# Patient Record
Sex: FEMALE | Race: WHITE | Employment: FULL TIME | URBAN - METROPOLITAN AREA
[De-identification: names, ages, dates, MRNs, and addresses within clinical notes are randomized per-mention and may not be internally consistent; named-entity substitution may affect disease eponyms.]

---

## 2017-01-05 ENCOUNTER — NURSE ONLY (OUTPATIENT)
Dept: ENDOCRINOLOGY CLINIC | Facility: CLINIC | Age: 32
End: 2017-01-05

## 2017-01-05 DIAGNOSIS — E11.8 UNCONTROLLED TYPE 2 DIABETES MELLITUS WITH COMPLICATION, WITH LONG-TERM CURRENT USE OF INSULIN (HCC): Primary | ICD-10-CM

## 2017-01-05 DIAGNOSIS — E11.65 UNCONTROLLED TYPE 2 DIABETES MELLITUS WITH COMPLICATION, WITH LONG-TERM CURRENT USE OF INSULIN (HCC): Primary | ICD-10-CM

## 2017-01-05 DIAGNOSIS — Z79.4 UNCONTROLLED TYPE 2 DIABETES MELLITUS WITH COMPLICATION, WITH LONG-TERM CURRENT USE OF INSULIN (HCC): Primary | ICD-10-CM

## 2017-01-05 PROCEDURE — 99211 OFF/OP EST MAY X REQ PHY/QHP: CPT | Performed by: INTERNAL MEDICINE

## 2017-01-05 NOTE — PROGRESS NOTES
Tessie June in clinic today for BG review. Currently 11 weeks pregnant. [facility]. Per Flowsheets blood sugars have been elevated overall for last few days.  She did miss one day entirely when she forgot her meter a

## 2017-01-14 ENCOUNTER — CHARTING TRANS (OUTPATIENT)
Dept: OTHER | Age: 32
End: 2017-01-14

## 2017-01-16 ENCOUNTER — PATIENT MESSAGE (OUTPATIENT)
Dept: ENDOCRINOLOGY CLINIC | Facility: CLINIC | Age: 32
End: 2017-01-16

## 2017-01-26 ENCOUNTER — OFFICE VISIT (OUTPATIENT)
Dept: ENDOCRINOLOGY CLINIC | Facility: CLINIC | Age: 32
End: 2017-01-26

## 2017-01-26 VITALS
SYSTOLIC BLOOD PRESSURE: 138 MMHG | BODY MASS INDEX: 44 KG/M2 | WEIGHT: 267 LBS | DIASTOLIC BLOOD PRESSURE: 82 MMHG | HEART RATE: 105 BPM

## 2017-01-26 DIAGNOSIS — E11.8 UNCONTROLLED TYPE 2 DIABETES MELLITUS WITH COMPLICATION, UNSPECIFIED LONG TERM INSULIN USE STATUS: Primary | ICD-10-CM

## 2017-01-26 DIAGNOSIS — E11.65 UNCONTROLLED TYPE 2 DIABETES MELLITUS WITH COMPLICATION, UNSPECIFIED LONG TERM INSULIN USE STATUS: Primary | ICD-10-CM

## 2017-01-26 LAB
CARTRIDGE LOT#: ABNORMAL NUMERIC
GLUCOSE BLOOD: 103
HEMOGLOBIN A1C: 7.1 % (ref 4.3–5.6)
TEST STRIP LOT #: NORMAL NUMERIC

## 2017-01-26 PROCEDURE — 36416 COLLJ CAPILLARY BLOOD SPEC: CPT | Performed by: INTERNAL MEDICINE

## 2017-01-26 PROCEDURE — 82962 GLUCOSE BLOOD TEST: CPT | Performed by: INTERNAL MEDICINE

## 2017-01-26 PROCEDURE — 99214 OFFICE O/P EST MOD 30 MIN: CPT | Performed by: INTERNAL MEDICINE

## 2017-01-26 PROCEDURE — 83036 HEMOGLOBIN GLYCOSYLATED A1C: CPT | Performed by: INTERNAL MEDICINE

## 2017-01-26 NOTE — PROGRESS NOTES
Name: Viv Kelly  Date: 1/26/2017    Referring Physician: No ref. provider found    HISTORY OF PRESENT ILLNESS   Viv Kelly is a 27year old female who presents for diabetes mellitus, complicated by pregnancy currently 14 weeks pregnant. Pen-injector, Inject 16 Units into the skin 3 (three) times daily before meals.  As instructed, Disp: 45 mL, Rfl: 1  •  Levothyroxine Sodium 137 MCG Oral Tab, Take 137 mcg by mouth before breakfast., Disp: 90 tablet, Rfl: 1  •  Insulin Pen Needle (BD PEN NE Wt 267 lb (121.11 kg)  LMP 03/13/2016    General Appearance:  alert, well developed, in no acute distress  Eyes:  normal conjunctivae, sclera. , normal sclera and normal pupils  Ears/Nose/Mouth/Throat/Neck:  no palpable thyroid nodules or cervical lymphaden

## 2017-01-31 ENCOUNTER — LAB SERVICES (OUTPATIENT)
Dept: OTHER | Age: 32
End: 2017-01-31

## 2017-01-31 ENCOUNTER — CHARTING TRANS (OUTPATIENT)
Dept: OTHER | Age: 32
End: 2017-01-31

## 2017-01-31 LAB
APPEARANCE: CLEAR
BILIRUBIN: NORMAL
COLOR: YELLOW
GLUCOSE U: NORMAL
KETONES: NORMAL
LEUKOCYTES: NORMAL
NITRITE: NORMAL
OCCULT BLOOD: NORMAL
PH: 6
PROTEIN: NORMAL
URINE SPEC GRAVITY: >=1.03
UROBILINOGEN: NORMAL

## 2017-02-01 ENCOUNTER — CHARTING TRANS (OUTPATIENT)
Dept: OTHER | Age: 32
End: 2017-02-01

## 2017-02-02 ENCOUNTER — CHARTING TRANS (OUTPATIENT)
Dept: OTHER | Age: 32
End: 2017-02-02

## 2017-02-02 LAB
ABO + RH BLD: ABNORMAL
BACTERIA UR CULT: NORMAL
BASOPHILS # BLD: 0 K/MCL (ref 0–0.3)
BASOPHILS NFR BLD: 0 %
BLD GP AB SCN SERPL QL: NEGATIVE
DIFFERENTIAL METHOD BLD: ABNORMAL
EOSINOPHIL # BLD: 0.1 K/MCL (ref 0.1–0.5)
EOSINOPHIL NFR BLD: 1 %
ERYTHROCYTE [DISTWIDTH] IN BLOOD: 14 % (ref 11–15)
HBV SURFACE AG SER QL: NEGATIVE
HEMATOCRIT: 38.7 % (ref 36–46.5)
HEMOGLOBIN: 12.7 G/DL (ref 12–15.5)
HIV 1+2 AB+HIV1 P24 AG SERPL QL IA: NONREACTIVE
LYMPHOCYTES # BLD: 2.8 K/MCL (ref 1–4.8)
LYMPHOCYTES NFR BLD: 22 %
MEAN CORPUSCULAR HEMOGLOBIN: 28.2 PG (ref 26–34)
MEAN CORPUSCULAR HGB CONC: 32.8 G/DL (ref 32–36.5)
MEAN CORPUSCULAR VOLUME: 85.8 FL (ref 78–100)
MONOCYTES # BLD: 0.6 K/MCL (ref 0.3–0.9)
MONOCYTES NFR BLD: 5 %
NEUTROPHILS # BLD: 9.1 K/MCL (ref 1.8–7.7)
NEUTROPHILS NFR BLD: 72 %
PLATELET COUNT: 191 K/MCL (ref 140–450)
RED CELL COUNT: 4.51 MIL/MCL (ref 4–5.2)
RUBV IGG SERPL IA-ACNC: 348.2 UNITS/ML
T PALLIDUM IGG SER QL IA: NONREACTIVE
WHITE BLOOD COUNT: 12.7 K/MCL (ref 4.2–11)

## 2017-02-07 ENCOUNTER — CHARTING TRANS (OUTPATIENT)
Dept: OTHER | Age: 32
End: 2017-02-07

## 2017-02-07 LAB
CFTR ALLELE 1 BLD/T QL: NORMAL
CFTR ALLELE 2 BLD/T QL: NORMAL
CFTR MUT ANL BLD/T: NORMAL
HISTORY OF FAMILY MEMBER DISEASES NOTE: NORMAL
PATHOLOGIST NAME: NORMAL
SYMPTOM: NORMAL

## 2017-02-10 ENCOUNTER — APPOINTMENT (OUTPATIENT)
Dept: LAB | Age: 32
End: 2017-02-10
Attending: INTERNAL MEDICINE
Payer: COMMERCIAL

## 2017-02-10 ENCOUNTER — PATIENT MESSAGE (OUTPATIENT)
Dept: ENDOCRINOLOGY CLINIC | Facility: CLINIC | Age: 32
End: 2017-02-10

## 2017-02-10 ENCOUNTER — TELEPHONE (OUTPATIENT)
Dept: ENDOCRINOLOGY CLINIC | Facility: CLINIC | Age: 32
End: 2017-02-10

## 2017-02-10 DIAGNOSIS — E03.9 HYPOTHYROIDISM, UNSPECIFIED TYPE: ICD-10-CM

## 2017-02-10 LAB
T4 FREE SERPL-MCNC: 0.87 NG/DL (ref 0.58–1.64)
TSH SERPL-ACNC: 3.61 UIU/ML (ref 0.34–5.6)

## 2017-02-10 PROCEDURE — 84443 ASSAY THYROID STIM HORMONE: CPT

## 2017-02-10 PROCEDURE — 84439 ASSAY OF FREE THYROXINE: CPT

## 2017-02-10 PROCEDURE — 36415 COLL VENOUS BLD VENIPUNCTURE: CPT

## 2017-02-10 NOTE — TELEPHONE ENCOUNTER
Patient stated OK to leave detailed message since she is at work. Left detailed message with patient of Dr. Adebayo Muñoz message below. Requested she call or email to confirm she got the message.

## 2017-02-10 NOTE — TELEPHONE ENCOUNTER
Patient has not yet read [employer] message.  LMTCB- informed patient to look in [employer]

## 2017-02-10 NOTE — TELEPHONE ENCOUNTER
Patient called very upset. Her nephew recently passed away and she has been off of work for some time. She is now re-starting work and needs to reschedule her nurse appointment to 2/28. Booked for 1pm that day. She is not able to come tomorrow.  She is conc

## 2017-02-10 NOTE — TELEPHONE ENCOUNTER
Overall BG levels are slowly improving - increase Levemir to 20 in the morning and 80 at night. Has she been thinking about insulin pump?

## 2017-02-11 NOTE — TELEPHONE ENCOUNTER
From: Zachary Heck RN  To: Cecil Diaz  Sent: 2/10/2017 9:58 AM CST  Subject: Follow up on medication dose    Good Morning Destini,   You spoke with Severa Arm earlier and she did have Dr. Sofya Abernathy take a look at your blood sugars.  She says, \"Overal

## 2017-02-11 NOTE — TELEPHONE ENCOUNTER
Dr. Mckay Lugo please see patient's message.  Refill for Novolog with updated instructions pending 90 day refill per patient request.

## 2017-02-13 ENCOUNTER — TELEPHONE (OUTPATIENT)
Dept: ENDOCRINOLOGY CLINIC | Facility: CLINIC | Age: 32
End: 2017-02-13

## 2017-02-13 DIAGNOSIS — O99.280 HYPOTHYROIDISM AFFECTING PREGNANCY: Primary | ICD-10-CM

## 2017-02-13 DIAGNOSIS — E03.9 HYPOTHYROIDISM AFFECTING PREGNANCY: Primary | ICD-10-CM

## 2017-02-13 NOTE — TELEPHONE ENCOUNTER
Please call patient - thyroid levels are not at goal, increase LT4 to 150mcg PO daily. Repeat TSH, FT4 in one month.

## 2017-02-14 RX ORDER — LEVOTHYROXINE SODIUM 0.15 MG/1
150 TABLET ORAL
Qty: 30 TABLET | Refills: 3 | Status: SHIPPED | OUTPATIENT
Start: 2017-02-14 | End: 2017-03-15 | Stop reason: DRUGHIGH

## 2017-02-15 NOTE — TELEPHONE ENCOUNTER
Spoke with patient and informed her of Dr. Warren Brice message below. She verbalized her understanding. Ordered repeat blood work. Rx has been sent to pharmacy.

## 2017-02-19 ENCOUNTER — PATIENT MESSAGE (OUTPATIENT)
Dept: ENDOCRINOLOGY CLINIC | Facility: CLINIC | Age: 32
End: 2017-02-19

## 2017-02-20 NOTE — TELEPHONE ENCOUNTER
From: Pankaj Gregg  To: Ann Burnett MD  Sent: 2/19/2017 8:29 AM CST  Subject: Prescription Question    Hi Dr. Franki Topete,     Can you send a 90 refill for the levemir? Current dosing is 80 units at night and 20 in the morning. Thanks so much!

## 2017-02-28 ENCOUNTER — NURSE ONLY (OUTPATIENT)
Dept: ENDOCRINOLOGY CLINIC | Facility: CLINIC | Age: 32
End: 2017-02-28

## 2017-02-28 DIAGNOSIS — Z79.4 UNCONTROLLED TYPE 2 DIABETES MELLITUS WITH COMPLICATION, WITH LONG-TERM CURRENT USE OF INSULIN (HCC): Primary | ICD-10-CM

## 2017-02-28 DIAGNOSIS — E11.8 UNCONTROLLED TYPE 2 DIABETES MELLITUS WITH COMPLICATION, WITH LONG-TERM CURRENT USE OF INSULIN (HCC): Primary | ICD-10-CM

## 2017-02-28 DIAGNOSIS — E11.65 UNCONTROLLED TYPE 2 DIABETES MELLITUS WITH COMPLICATION, WITH LONG-TERM CURRENT USE OF INSULIN (HCC): Primary | ICD-10-CM

## 2017-02-28 LAB
CARTRIDGE LOT#: ABNORMAL NUMERIC
HEMOGLOBIN A1C: 6 % (ref 4.3–5.6)

## 2017-02-28 PROCEDURE — 83036 HEMOGLOBIN GLYCOSYLATED A1C: CPT | Performed by: INTERNAL MEDICINE

## 2017-02-28 PROCEDURE — 36416 COLLJ CAPILLARY BLOOD SPEC: CPT | Performed by: INTERNAL MEDICINE

## 2017-02-28 NOTE — PROGRESS NOTES
Louie Fontana presents to clinic for BG review. Currently 18.5 weeks pregnant. Recently found out she is having baby boy. She is feeling well. Blood sugars reviewed per flowsheet.    Discussed again goals of fasting blood sugars between 70-90 and pos prandial goa

## 2017-03-01 ENCOUNTER — TELEPHONE (OUTPATIENT)
Dept: ENDOCRINOLOGY CLINIC | Facility: CLINIC | Age: 32
End: 2017-03-01

## 2017-03-01 NOTE — TELEPHONE ENCOUNTER
Sent patient mychart message with information below. Will await response to get patient scheduled for MD appt.

## 2017-03-01 NOTE — TELEPHONE ENCOUNTER
HgA1c is significantly improved, 6.0%. In addition review of BG flowsheet overall demonstrates improved levels. Will continue current dose of insulin. Please schedule MD visit in 2 weeks.

## 2017-03-01 NOTE — TELEPHONE ENCOUNTER
Patient seen in clinic for BG review yesterday. Please review HgA1C and BG readings on flowsheet to see if any insulin changes need to be made.

## 2017-03-02 ENCOUNTER — CHARTING TRANS (OUTPATIENT)
Dept: OTHER | Age: 32
End: 2017-03-02

## 2017-03-02 ENCOUNTER — LAB SERVICES (OUTPATIENT)
Dept: OTHER | Age: 32
End: 2017-03-02

## 2017-03-02 LAB
APPEARANCE: CLEAR
BILIRUBIN: NEGATIVE
COLOR: YELLOW
GLUCOSE U: NEGATIVE
KETONES: NEGATIVE
LEUKOCYTES: NEGATIVE
NITRITE: NEGATIVE
OCCULT BLOOD: NEGATIVE
PH: 5.5
PROTEIN: NEGATIVE
URINE SPEC GRAVITY: 1.03
UROBILINOGEN: 0.2

## 2017-03-06 ENCOUNTER — PATIENT MESSAGE (OUTPATIENT)
Dept: ENDOCRINOLOGY CLINIC | Facility: CLINIC | Age: 32
End: 2017-03-06

## 2017-03-07 NOTE — TELEPHONE ENCOUNTER
From: Lashell Cooper  To:  Bryan Oakley MD  Sent: 3/6/2017 5:33 PM CST  Subject: Prescription Question    Hi Dr. Mendel Downy,    My sugars the last couple days have been much higher than normal even though I'm still eating the same things and monitoring my

## 2017-03-07 NOTE — TELEPHONE ENCOUNTER
Patient is currently pregnant. Blood sugars available via Flowsheets. Dr. Ines Thorne would you mind taking a look?

## 2017-03-08 NOTE — TELEPHONE ENCOUNTER
Per Dr. Chuy Perez she would like to make the following changes:    Increase Novolog to 28u with breakfast, 28u with lunch, and 30u with dinner  Increase morning lantus dose to 24 units, keep evening dose the same at 80 units.      Email/Call if álvaro corrigan

## 2017-03-08 NOTE — TELEPHONE ENCOUNTER
Per request from AM. Called patient for sugars in relation to meals. Recorded sugars listed below. Insulin doses available in Email. Patient did not eat today. Her sugar now is 74. She will be eating dinner in a few minutes.               JOSIAH COLIN

## 2017-03-09 ENCOUNTER — PATIENT MESSAGE (OUTPATIENT)
Dept: ENDOCRINOLOGY CLINIC | Facility: CLINIC | Age: 32
End: 2017-03-09

## 2017-03-09 NOTE — TELEPHONE ENCOUNTER
From: Deja Menendez  To: July Sanchez MD  Sent: 3/9/2017 7:33 AM CST  Subject: Prescription Question    Hi Dr. Wanda Rodríguez,    My morning sugars are still high, yesterday before breakfast was 124 and today was 134 even though it was 88 when I went to bed.

## 2017-03-14 ENCOUNTER — APPOINTMENT (OUTPATIENT)
Dept: LAB | Age: 32
End: 2017-03-14
Attending: INTERNAL MEDICINE
Payer: COMMERCIAL

## 2017-03-14 DIAGNOSIS — O99.280 HYPOTHYROIDISM AFFECTING PREGNANCY: ICD-10-CM

## 2017-03-14 DIAGNOSIS — E03.9 HYPOTHYROIDISM AFFECTING PREGNANCY: ICD-10-CM

## 2017-03-14 LAB
T4 FREE SERPL-MCNC: 0.67 NG/DL (ref 0.58–1.64)
TSH SERPL-ACNC: 2.47 UIU/ML (ref 0.34–5.6)

## 2017-03-14 PROCEDURE — 84443 ASSAY THYROID STIM HORMONE: CPT

## 2017-03-14 PROCEDURE — 84439 ASSAY OF FREE THYROXINE: CPT

## 2017-03-14 PROCEDURE — 36415 COLL VENOUS BLD VENIPUNCTURE: CPT

## 2017-03-15 ENCOUNTER — PATIENT MESSAGE (OUTPATIENT)
Dept: ENDOCRINOLOGY CLINIC | Facility: CLINIC | Age: 32
End: 2017-03-15

## 2017-03-15 DIAGNOSIS — E03.9 HYPOTHYROIDISM, UNSPECIFIED TYPE: Primary | ICD-10-CM

## 2017-03-15 RX ORDER — LEVOTHYROXINE SODIUM 175 UG/1
175 TABLET ORAL
Qty: 30 TABLET | Refills: 3 | Status: SHIPPED | OUTPATIENT
Start: 2017-03-15 | End: 2017-04-04

## 2017-03-15 NOTE — TELEPHONE ENCOUNTER
From: Leonard Vasquez  To: Frank Smith MD  Sent: 3/15/2017 7:26 AM CDT  Subject: Prescription Question    Hi Dr. Eduar England,     I wanted to let you know that I went for my thyroid test yesterday so let me know if I need to a new prescription.  Also my sug

## 2017-03-15 NOTE — TELEPHONE ENCOUNTER
I'm sorry about nightmares but I do think we should increase insulin. Increase Levemir to 90 units at bedtime. Also increase LT4 to 175mcg PO daily and repeat TSH, FT4 in one month. Thanks.

## 2017-03-26 ENCOUNTER — PATIENT MESSAGE (OUTPATIENT)
Dept: ENDOCRINOLOGY CLINIC | Facility: CLINIC | Age: 32
End: 2017-03-26

## 2017-03-27 ENCOUNTER — CHARTING TRANS (OUTPATIENT)
Dept: OTHER | Age: 32
End: 2017-03-27

## 2017-03-27 NOTE — TELEPHONE ENCOUNTER
From: Ofelia Welch  To: Sabas Contreras MD  Sent: 3/26/2017 11:24 AM CDT  Subject: Prescription Question    Hi Dr. Ledy Sun, looks like morning sugar went down, it was 115 this morning.  I had breakfast for the first time in awhile and it was 142 after br

## 2017-03-27 NOTE — TELEPHONE ENCOUNTER
Dr. Katt Bray please see patient's sugars in flowsheets and messages below including regimen. Patient is a gestational diabetic.

## 2017-03-28 ENCOUNTER — HOSPITAL (OUTPATIENT)
Dept: OTHER | Age: 32
End: 2017-03-28
Attending: OBSTETRICS & GYNECOLOGY

## 2017-04-04 ENCOUNTER — TELEPHONE (OUTPATIENT)
Dept: ENDOCRINOLOGY CLINIC | Facility: CLINIC | Age: 32
End: 2017-04-04

## 2017-04-04 RX ORDER — LEVOTHYROXINE SODIUM 175 UG/1
175 TABLET ORAL
Qty: 30 TABLET | Refills: 5 | Status: SHIPPED | OUTPATIENT
Start: 2017-04-04 | End: 2017-11-17

## 2017-04-04 NOTE — TELEPHONE ENCOUNTER
Current Outpatient Prescriptions:  Levothyroxine Sodium 175 MCG Oral Tab Take 1 tablet (175 mcg total) by mouth before breakfast. Disp: 30 tablet Rfl: 3

## 2017-04-07 ENCOUNTER — PATIENT MESSAGE (OUTPATIENT)
Dept: ENDOCRINOLOGY CLINIC | Facility: CLINIC | Age: 32
End: 2017-04-07

## 2017-04-07 DIAGNOSIS — E06.3 HYPOTHYROIDISM DUE TO HASHIMOTO'S THYROIDITIS: Primary | ICD-10-CM

## 2017-04-07 DIAGNOSIS — E03.8 HYPOTHYROIDISM DUE TO HASHIMOTO'S THYROIDITIS: Primary | ICD-10-CM

## 2017-04-10 ENCOUNTER — APPOINTMENT (OUTPATIENT)
Dept: LAB | Age: 32
End: 2017-04-10
Attending: INTERNAL MEDICINE
Payer: COMMERCIAL

## 2017-04-10 ENCOUNTER — PATIENT MESSAGE (OUTPATIENT)
Dept: ENDOCRINOLOGY CLINIC | Facility: CLINIC | Age: 32
End: 2017-04-10

## 2017-04-10 DIAGNOSIS — E06.3 HYPOTHYROIDISM DUE TO HASHIMOTO'S THYROIDITIS: ICD-10-CM

## 2017-04-10 DIAGNOSIS — E03.8 HYPOTHYROIDISM DUE TO HASHIMOTO'S THYROIDITIS: ICD-10-CM

## 2017-04-10 PROCEDURE — 84439 ASSAY OF FREE THYROXINE: CPT

## 2017-04-10 PROCEDURE — 84443 ASSAY THYROID STIM HORMONE: CPT

## 2017-04-10 PROCEDURE — 36415 COLL VENOUS BLD VENIPUNCTURE: CPT

## 2017-04-10 NOTE — TELEPHONE ENCOUNTER
From: Ofelia Welch  To:  Sabas Contreras MD  Sent: 4/10/2017 8:31 AM CDT  Subject: Prescription Question    Hi Dr. Villafana Peers,     I'm on my last few pills for the synthroid, do you want me to go today and get another thyroid test or just  the 175 pr

## 2017-04-10 NOTE — TELEPHONE ENCOUNTER
From: Luna Carrington MD  To: Mulu Harris  Sent: 4/7/2017 8:15 AM CDT  Subject: results    Good Morning, I received some recent BG levels that are elevated, do you have any more recent readings? I think your insulin needs to be adjusted. Thanks.

## 2017-04-12 ENCOUNTER — CHARTING TRANS (OUTPATIENT)
Dept: OTHER | Age: 32
End: 2017-04-12

## 2017-04-12 LAB
BILIRUBIN: NEGATIVE
GLUCOSE U: NEGATIVE
KETONES: NEGATIVE
LEUKOCYTES: NEGATIVE
NITRITE: NEGATIVE
OCCULT BLOOD: NEGATIVE
PH: 5.5
PROTEIN: 30
URINE SPEC GRAVITY: 1.03
UROBILINOGEN: 0.2

## 2017-04-14 LAB
ALBUMIN SERPL-MCNC: 3.1 G/DL (ref 3.6–5.1)
ALBUMIN/GLOB SERPL: 0.8 (ref 1–2.4)
ALP SERPL-CCNC: 60 UNITS/L (ref 45–117)
ALT SERPL-CCNC: 21 UNITS/L
ANION GAP SERPL CALC-SCNC: 16 MMOL/L (ref 10–20)
AST SERPL-CCNC: 13 UNITS/L
BILIRUB SERPL-MCNC: 0.3 MG/DL (ref 0.2–1)
BUN SERPL-MCNC: 11 MG/DL (ref 6–20)
BUN/CREAT SERPL: 18 (ref 7–25)
CALCIUM SERPL-MCNC: 9.2 MG/DL (ref 8.4–10.2)
CHLORIDE SERPL-SCNC: 105 MMOL/L (ref 98–107)
CO2 SERPL-SCNC: 24 MMOL/L (ref 21–32)
CREAT SERPL-MCNC: 0.62 MG/DL (ref 0.51–0.95)
ERYTHROCYTE [DISTWIDTH] IN BLOOD: 13.8 % (ref 11–15)
GLOBULIN SER-MCNC: 3.8 G/DL (ref 2–4)
GLUCOSE SERPL-MCNC: 76 MG/DL (ref 65–99)
HEMATOCRIT: 37.2 % (ref 36–46.5)
HEMOGLOBIN: 12.2 G/DL (ref 12–15.5)
LENGTH OF FAST TIME PATIENT: ABNORMAL HRS
MEAN CORPUSCULAR HEMOGLOBIN: 28.8 PG (ref 26–34)
MEAN CORPUSCULAR HGB CONC: 32.8 G/DL (ref 32–36.5)
MEAN CORPUSCULAR VOLUME: 87.9 FL (ref 78–100)
PLATELET COUNT: 179 K/MCL (ref 140–450)
POTASSIUM SERPL-SCNC: 4 MMOL/L (ref 3.4–5.1)
RED CELL COUNT: 4.23 MIL/MCL (ref 4–5.2)
SODIUM SERPL-SCNC: 141 MMOL/L (ref 135–145)
TOTAL PROTEIN: 6.9 G/DL (ref 6.4–8.2)
URIC ACID: 3.9 MG/DL (ref 2.6–5.9)
WHITE BLOOD COUNT: 10.9 K/MCL (ref 4.2–11)

## 2017-04-16 ENCOUNTER — LAB SERVICES (OUTPATIENT)
Dept: OTHER | Age: 32
End: 2017-04-16

## 2017-04-19 ENCOUNTER — PATIENT MESSAGE (OUTPATIENT)
Dept: ENDOCRINOLOGY CLINIC | Facility: CLINIC | Age: 32
End: 2017-04-19

## 2017-04-19 ENCOUNTER — CHARTING TRANS (OUTPATIENT)
Dept: OTHER | Age: 32
End: 2017-04-19

## 2017-04-19 ENCOUNTER — TELEPHONE (OUTPATIENT)
Dept: ENDOCRINOLOGY CLINIC | Facility: CLINIC | Age: 32
End: 2017-04-19

## 2017-04-19 LAB
PROT 24H UR-MRATE: 0 MG/24 HR (ref 0–148)
PROT UR-MCNC: 10 MG/DL

## 2017-04-19 NOTE — TELEPHONE ENCOUNTER
LMTCB- also sent [employer] message. There is 5pm MD approval slot open tomorrow. Will offer patient.

## 2017-04-19 NOTE — TELEPHONE ENCOUNTER
Please call patient - her BG Levels remain above goal and she is overdue for f/u appt. I think she has cancelled her last 2 appointments with MD.  Please schedule appt in the next few days, ok to overbook the schedule for tomorrow.

## 2017-04-20 ENCOUNTER — OFFICE VISIT (OUTPATIENT)
Dept: ENDOCRINOLOGY CLINIC | Facility: CLINIC | Age: 32
End: 2017-04-20

## 2017-04-20 VITALS
HEART RATE: 94 BPM | WEIGHT: 283.19 LBS | SYSTOLIC BLOOD PRESSURE: 121 MMHG | HEIGHT: 65 IN | DIASTOLIC BLOOD PRESSURE: 77 MMHG | BODY MASS INDEX: 47.18 KG/M2

## 2017-04-20 DIAGNOSIS — Z79.4 UNCONTROLLED TYPE 2 DIABETES MELLITUS WITH HYPERGLYCEMIA, WITH LONG-TERM CURRENT USE OF INSULIN (HCC): Primary | ICD-10-CM

## 2017-04-20 DIAGNOSIS — E11.65 UNCONTROLLED TYPE 2 DIABETES MELLITUS WITH HYPERGLYCEMIA, WITH LONG-TERM CURRENT USE OF INSULIN (HCC): Primary | ICD-10-CM

## 2017-04-20 PROCEDURE — 83036 HEMOGLOBIN GLYCOSYLATED A1C: CPT | Performed by: INTERNAL MEDICINE

## 2017-04-20 PROCEDURE — 36416 COLLJ CAPILLARY BLOOD SPEC: CPT | Performed by: INTERNAL MEDICINE

## 2017-04-20 PROCEDURE — 82962 GLUCOSE BLOOD TEST: CPT | Performed by: INTERNAL MEDICINE

## 2017-04-20 PROCEDURE — 99214 OFFICE O/P EST MOD 30 MIN: CPT | Performed by: INTERNAL MEDICINE

## 2017-04-20 NOTE — PATIENT INSTRUCTIONS
Levemir 40 units SQ QAM, 120 units SQ QPM    Novolog 38 units with breakfast, lunch and 46 units SQ dinner

## 2017-04-20 NOTE — PROGRESS NOTES
Name: Laurita Crowe  Date: 4/20/2017    Referring Physician: No ref. provider found    HISTORY OF PRESENT ILLNESS   Laurita Crowe is a 34year old female who presents for diabetes mellitus, complicated by pregnancy currently 26 weeks pregnant. UNIT/ML Subcutaneous Solution Pen-injector, Inject 24 units with breakfast, 24 units with lunch, and 30 units with dinner.  (Patient taking differently: Inject 36 units with breakfast, 36 units with lunch, and 42 units with dinner. ), Disp: 75 mL, Rfl: 0  • removed from R knee\"               PHYSICAL EXAM  /77 mmHg  Pulse 94  Ht 5' 5\" (1.651 m)  Wt 283 lb 3.2 oz (128.459 kg)  BMI 47.13 kg/m2    General Appearance:  alert, well developed, in no acute distress  Eyes:  normal conjunctivae, scler

## 2017-04-20 NOTE — TELEPHONE ENCOUNTER
From: Tunde Sheppard RN  To: Park Perdomo  Sent: 4/19/2017 10:16 AM CDT  Subject: follow up    Good Morning [place],   Dr. Malvin Verma reviewed your blood sugars.  She says they are still above goal and she does need to see you in the office as it has b

## 2017-04-21 LAB
COLLECT DURATION TIME UR: 24 HRS
SERVICE CMNT-IMP: NORMAL
SPECIMEN VOL UR: 2900 ML

## 2017-04-27 ENCOUNTER — PATIENT MESSAGE (OUTPATIENT)
Dept: ENDOCRINOLOGY CLINIC | Facility: CLINIC | Age: 32
End: 2017-04-27

## 2017-04-28 NOTE — TELEPHONE ENCOUNTER
From: Deja Menendez  To:  July Sanchez MD  Sent: 4/27/2017 9:37 PM CDT  Subject: Prescription Question    Hi Dr. Wanda Rodríguez,    Can you send a 90 day refill for levemir (130 units at night/40 units in the morning) and one for the novolog (38 units breakfa

## 2017-05-02 ENCOUNTER — TELEPHONE (OUTPATIENT)
Dept: ENDOCRINOLOGY CLINIC | Facility: CLINIC | Age: 32
End: 2017-05-02

## 2017-05-02 ENCOUNTER — CHARTING TRANS (OUTPATIENT)
Dept: OTHER | Age: 32
End: 2017-05-02

## 2017-05-02 NOTE — TELEPHONE ENCOUNTER
Current Outpatient Prescriptions:  Levothyroxine Sodium 175 MCG Oral Tab Take 1 tablet (175 mcg total) by mouth before breakfast. Disp: 30 tablet Rfl: 5     Refill

## 2017-05-02 NOTE — TELEPHONE ENCOUNTER
LOV 4/20/17. Per  protocol OK to refill 6 months. However, per LOV note, patient to continue on 137 mcg levothyroxine daily. Dr. Rizwan Farah has levothyroxine 175 mcg dose been discontinued? If so can call pharmacy to discontinue medication.

## 2017-05-03 ENCOUNTER — CHARTING TRANS (OUTPATIENT)
Dept: OTHER | Age: 32
End: 2017-05-03

## 2017-05-03 ENCOUNTER — LAB SERVICES (OUTPATIENT)
Dept: OTHER | Age: 32
End: 2017-05-03

## 2017-05-03 LAB
APPEARANCE: CLEAR
BILIRUBIN: NEGATIVE
COLOR: YELLOW
GLUCOSE U: NORMAL
KETONES: NEGATIVE
LEUKOCYTE ESTERASE: NEGATIVE
NITRITE: NEGATIVE
OCCULT BLOOD: NEGATIVE
PH: 6.5
PROTEIN: NEGATIVE
URINE SPEC GRAVITY: 1.02
UROBILINOGEN: NORMAL

## 2017-05-03 NOTE — TELEPHONE ENCOUNTER
Refills have been sent. Orders for labs in chart. Messaged patient to provide her with instructions.

## 2017-05-03 NOTE — TELEPHONE ENCOUNTER
Dr. Rizwan Farah please see patient's message. Refills pending for levemir and novolog. Also, patient is wondering if she needs to repeat thyroid labs.  If not, she also needs a refill for thyroid medication

## 2017-05-08 ENCOUNTER — APPOINTMENT (OUTPATIENT)
Dept: LAB | Facility: HOSPITAL | Age: 32
End: 2017-05-08
Attending: OBSTETRICS & GYNECOLOGY
Payer: COMMERCIAL

## 2017-05-08 ENCOUNTER — HOSPITAL ENCOUNTER (OUTPATIENT)
Dept: ULTRASOUND IMAGING | Facility: HOSPITAL | Age: 32
Discharge: HOME | End: 2017-05-08
Attending: OBSTETRICS & GYNECOLOGY
Payer: COMMERCIAL

## 2017-05-08 DIAGNOSIS — O24.319 MODIFIED WHITE CLASS B PREGESTATIONAL DIABETES MELLITUS: ICD-10-CM

## 2017-05-08 DIAGNOSIS — E03.9 HYPOTHYROIDISM, UNSPECIFIED TYPE: ICD-10-CM

## 2017-05-08 DIAGNOSIS — E03.9 HYPOTHYROIDISM: ICD-10-CM

## 2017-05-08 DIAGNOSIS — O14.90 PREECLAMPSIA, UNSPECIFIED TRIMESTER: ICD-10-CM

## 2017-05-08 PROCEDURE — 84439 ASSAY OF FREE THYROXINE: CPT

## 2017-05-08 PROCEDURE — 36415 COLL VENOUS BLD VENIPUNCTURE: CPT

## 2017-05-08 PROCEDURE — 76816 OB US FOLLOW-UP PER FETUS: CPT | Performed by: OBSTETRICS & GYNECOLOGY

## 2017-05-08 PROCEDURE — 84443 ASSAY THYROID STIM HORMONE: CPT

## 2017-05-11 ENCOUNTER — PATIENT MESSAGE (OUTPATIENT)
Dept: ENDOCRINOLOGY CLINIC | Facility: CLINIC | Age: 32
End: 2017-05-11

## 2017-05-11 NOTE — TELEPHONE ENCOUNTER
Ok, noted. Increase Levemir to 50 units SQ QAM, 135 units SQ QPM.  Continue current dose of Novolog.

## 2017-05-11 NOTE — TELEPHONE ENCOUNTER
From: Leonard Vasquez  To:  Frank Smith MD  Sent: 5/11/2017 12:33 PM CDT  Subject: Prescription Question    Hi Dr. Eduar England,    My sugars after breakfast was 156 and I was confused because all I ate was a bagel sandwich with an egg so it was about 55 car

## 2017-05-20 ENCOUNTER — CHARTING TRANS (OUTPATIENT)
Dept: OTHER | Age: 32
End: 2017-05-20

## 2017-05-20 ENCOUNTER — LAB SERVICES (OUTPATIENT)
Dept: OTHER | Age: 32
End: 2017-05-20

## 2017-05-20 LAB
APPEARANCE: CLEAR
BILIRUBIN: NORMAL
COLOR: YELLOW
GLUCOSE U: NEGATIVE
KETONES: NORMAL
LEUKOCYTE ESTERASE: NEGATIVE
NITRITE: NEGATIVE
OCCULT BLOOD: NEGATIVE
PH: 5.5
PROTEIN: NORMAL
URINE SPEC GRAVITY: 1.02
UROBILINOGEN: NORMAL

## 2017-05-23 ENCOUNTER — HOSPITAL (OUTPATIENT)
Dept: OTHER | Age: 32
End: 2017-05-23
Attending: PEDIATRICS

## 2017-05-25 ENCOUNTER — PATIENT MESSAGE (OUTPATIENT)
Dept: ENDOCRINOLOGY CLINIC | Facility: CLINIC | Age: 32
End: 2017-05-25

## 2017-05-25 NOTE — TELEPHONE ENCOUNTER
Current doses:  Levemir 60 units SQ in the morning and 135 units at night  Novolog 45-50-65 with meals.

## 2017-05-25 NOTE — TELEPHONE ENCOUNTER
Ok, let increase Levemir to 60 units SQ QAM, 80 units SQ with dinner and 80 units SQ at bedtime. Continue Novolog. How is bedtime BG level?

## 2017-05-25 NOTE — TELEPHONE ENCOUNTER
From: Minerva Velazco  To: Mayco Leiva MD  Sent: 5/25/2017 7:41 AM CDT  Subject: Other    Hi Dr. Mary Lim, my fasting was 149 this morning. I did eat dinner at close to 9pm and went to bed but it still seemed high so wanted to tell you.

## 2017-05-30 ENCOUNTER — TELEPHONE (OUTPATIENT)
Dept: ENDOCRINOLOGY CLINIC | Facility: CLINIC | Age: 32
End: 2017-05-30

## 2017-05-30 NOTE — TELEPHONE ENCOUNTER
Returned call to [facility] regarding dosing of Levemir. Per [facility] documentation from email encounter 5/25, patient to take 60 units ever morning, 80 units with dinner, and 80 units at bedtime. Confirmed this with pharmacy.

## 2017-05-30 NOTE — TELEPHONE ENCOUNTER
Pharmacy called with questions on dosage on levemir RX. Please call.         Current outpatient prescriptions:   •  insulin detemir (LEVEMIR FLEXTOUCH) 100 UNIT/ML Subcutaneous Solution Pen-injector, Inject 60 units with breakfast, 80 units around lunch, a

## 2017-06-01 ENCOUNTER — HOSPITAL ENCOUNTER (OUTPATIENT)
Dept: ULTRASOUND IMAGING | Facility: HOSPITAL | Age: 32
Discharge: HOME | End: 2017-06-01
Attending: OBSTETRICS & GYNECOLOGY
Payer: COMMERCIAL

## 2017-06-01 DIAGNOSIS — O24.319 MODIFIED WHITE CLASS B PREGESTATIONAL DIABETES MELLITUS: ICD-10-CM

## 2017-06-01 DIAGNOSIS — O14.90 PREECLAMPSIA, UNSPECIFIED TRIMESTER: ICD-10-CM

## 2017-06-01 DIAGNOSIS — E03.9 HYPOTHYROIDISM: ICD-10-CM

## 2017-06-01 PROCEDURE — 76816 OB US FOLLOW-UP PER FETUS: CPT | Performed by: OBSTETRICS & GYNECOLOGY

## 2017-06-03 ENCOUNTER — CHARTING TRANS (OUTPATIENT)
Dept: OTHER | Age: 32
End: 2017-06-03

## 2017-06-03 ENCOUNTER — LAB SERVICES (OUTPATIENT)
Dept: OTHER | Age: 32
End: 2017-06-03

## 2017-06-03 LAB
APPEARANCE: CLEAR
BILIRUBIN: NEGATIVE
COLOR: YELLOW
GLUCOSE U: NEGATIVE
KETONES: NEGATIVE
LEUKOCYTE ESTERASE: NEGATIVE
NITRITE: NEGATIVE
OCCULT BLOOD: NEGATIVE
PH: 6
PROTEIN: NORMAL
URINE SPEC GRAVITY: 1.02
UROBILINOGEN: NORMAL

## 2017-06-05 ENCOUNTER — CHARTING TRANS (OUTPATIENT)
Dept: OTHER | Age: 32
End: 2017-06-05

## 2017-06-06 LAB
HEMATOCRIT: 37 % (ref 36–46.5)
HEMOGLOBIN: 12.2 G/DL (ref 12–15.5)
T3 SERPL-MCNC: 1.82 NG/ML (ref 0.6–1.81)
T4 FREE SERPL-MCNC: 1.1 NG/DL (ref 0.8–1.5)
TSH SERPL-ACNC: 1.33 MCUNITS/ML (ref 0.35–5)

## 2017-06-08 ENCOUNTER — LAB SERVICES (OUTPATIENT)
Dept: OTHER | Age: 32
End: 2017-06-08

## 2017-06-08 ENCOUNTER — CHARTING TRANS (OUTPATIENT)
Dept: OTHER | Age: 32
End: 2017-06-08

## 2017-06-08 LAB
APPEARANCE: CLEAR
BILIRUBIN: NORMAL
COLOR: YELLOW
GLUCOSE U: NORMAL
KETONES: NORMAL
LEUKOCYTE ESTERASE: NORMAL
NITRITE: NORMAL
OCCULT BLOOD: NORMAL
PH: 6
PROTEIN: NORMAL
URINE SPEC GRAVITY: 1.02
UROBILINOGEN: 0.2

## 2017-06-15 ENCOUNTER — PATIENT MESSAGE (OUTPATIENT)
Dept: ENDOCRINOLOGY CLINIC | Facility: CLINIC | Age: 32
End: 2017-06-15

## 2017-06-15 ENCOUNTER — TELEPHONE (OUTPATIENT)
Dept: ENDOCRINOLOGY CLINIC | Facility: CLINIC | Age: 32
End: 2017-06-15

## 2017-06-17 ENCOUNTER — LAB SERVICES (OUTPATIENT)
Dept: OTHER | Age: 32
End: 2017-06-17

## 2017-06-17 ENCOUNTER — CHARTING TRANS (OUTPATIENT)
Dept: OTHER | Age: 32
End: 2017-06-17

## 2017-06-17 LAB
APPEARANCE: CLEAR
BILIRUBIN: NORMAL
COLOR: YELLOW
GLUCOSE U: NORMAL
KETONES: NORMAL
LEUKOCYTE ESTERASE: NORMAL
NITRITE: NORMAL
OCCULT BLOOD: NORMAL
PH: 5.5
PROTEIN: NORMAL
URINE SPEC GRAVITY: >1.03
UROBILINOGEN: 0.2

## 2017-06-19 ENCOUNTER — TELEPHONE (OUTPATIENT)
Dept: ENDOCRINOLOGY CLINIC | Facility: CLINIC | Age: 32
End: 2017-06-19

## 2017-06-19 NOTE — TELEPHONE ENCOUNTER
Pt states that she rec'd email requesting that she schedule follow up appt within the next week per Dr. Sofya Abernathy. Pt is 8 1/2 months pregnant. No appts available. Please call.

## 2017-06-19 NOTE — TELEPHONE ENCOUNTER
Called patient and offered a few cancellation spots this week and next. States she has two weeks left of work before maternity leave starts. Asking if she can come in an evening as she cannot take off work.  Thursday this week full in evening and looks like

## 2017-06-20 ENCOUNTER — CHARTING TRANS (OUTPATIENT)
Dept: OTHER | Age: 32
End: 2017-06-20

## 2017-06-22 ENCOUNTER — OFFICE VISIT (OUTPATIENT)
Dept: ENDOCRINOLOGY CLINIC | Facility: CLINIC | Age: 32
End: 2017-06-22

## 2017-06-22 VITALS
HEIGHT: 65 IN | SYSTOLIC BLOOD PRESSURE: 126 MMHG | HEART RATE: 99 BPM | BODY MASS INDEX: 48.82 KG/M2 | WEIGHT: 293 LBS | DIASTOLIC BLOOD PRESSURE: 85 MMHG

## 2017-06-22 DIAGNOSIS — E11.8 UNCONTROLLED TYPE 2 DIABETES MELLITUS WITH COMPLICATION, WITH LONG-TERM CURRENT USE OF INSULIN (HCC): Primary | ICD-10-CM

## 2017-06-22 DIAGNOSIS — Z79.4 UNCONTROLLED TYPE 2 DIABETES MELLITUS WITH COMPLICATION, WITH LONG-TERM CURRENT USE OF INSULIN (HCC): Primary | ICD-10-CM

## 2017-06-22 DIAGNOSIS — E11.65 UNCONTROLLED TYPE 2 DIABETES MELLITUS WITH COMPLICATION, WITH LONG-TERM CURRENT USE OF INSULIN (HCC): Primary | ICD-10-CM

## 2017-06-22 PROCEDURE — 36416 COLLJ CAPILLARY BLOOD SPEC: CPT | Performed by: INTERNAL MEDICINE

## 2017-06-22 PROCEDURE — 99214 OFFICE O/P EST MOD 30 MIN: CPT | Performed by: INTERNAL MEDICINE

## 2017-06-22 PROCEDURE — 83036 HEMOGLOBIN GLYCOSYLATED A1C: CPT | Performed by: INTERNAL MEDICINE

## 2017-06-22 PROCEDURE — 82962 GLUCOSE BLOOD TEST: CPT | Performed by: INTERNAL MEDICINE

## 2017-06-22 RX ORDER — METFORMIN HYDROCHLORIDE 500 MG/1
500 TABLET, EXTENDED RELEASE ORAL 2 TIMES DAILY WITH MEALS
Qty: 60 TABLET | Refills: 5 | Status: SHIPPED | OUTPATIENT
Start: 2017-06-22 | End: 2017-08-28

## 2017-06-22 NOTE — PATIENT INSTRUCTIONS
Increase Levemir to 60 units SQ QAM, 85 units SQ with dinner and bedtime    The day before  take normal dose of insulin     In the morning take normal dose of Novolog but no levemir the day of procedure.       After delivery restart Metformin ER 50

## 2017-06-23 NOTE — TELEPHONE ENCOUNTER
Current Outpatient Prescriptions:  insulin detemir (LEVEMIR FLEXTOUCH) 100 UNIT/ML Subcutaneous Solution Pen-injector Inject 60 units with breakfast, 80 units around lunch, and 80 units in the evening Disp: 200 mL Rfl: 1     Refill

## 2017-06-26 ENCOUNTER — CHARTING TRANS (OUTPATIENT)
Dept: OTHER | Age: 32
End: 2017-06-26

## 2017-06-26 ENCOUNTER — LAB SERVICES (OUTPATIENT)
Dept: OTHER | Age: 32
End: 2017-06-26

## 2017-06-28 ENCOUNTER — HOSPITAL ENCOUNTER (OUTPATIENT)
Dept: ULTRASOUND IMAGING | Facility: HOSPITAL | Age: 32
Discharge: HOME | End: 2017-06-28
Attending: OBSTETRICS & GYNECOLOGY
Payer: COMMERCIAL

## 2017-06-28 PROCEDURE — 76816 OB US FOLLOW-UP PER FETUS: CPT | Performed by: OBSTETRICS & GYNECOLOGY

## 2017-06-30 ENCOUNTER — LAB SERVICES (OUTPATIENT)
Dept: OTHER | Age: 32
End: 2017-06-30

## 2017-06-30 ENCOUNTER — CHARTING TRANS (OUTPATIENT)
Dept: OTHER | Age: 32
End: 2017-06-30

## 2017-06-30 LAB
APPEARANCE: CLEAR
BILIRUBIN: NORMAL
COLOR: YELLOW
GLUCOSE U: NORMAL
KETONES: NORMAL
LEUKOCYTE ESTERASE: NORMAL
NITRITE: NORMAL
OCCULT BLOOD: NORMAL
PH: 6.5
PROTEIN: NORMAL
URINE SPEC GRAVITY: >1.03
UROBILINOGEN: 0.2

## 2017-07-03 ENCOUNTER — LAB SERVICES (OUTPATIENT)
Dept: OTHER | Age: 32
End: 2017-07-03

## 2017-07-03 ENCOUNTER — CHARTING TRANS (OUTPATIENT)
Dept: OTHER | Age: 32
End: 2017-07-03

## 2017-07-03 LAB
APPEARANCE: CLEAR
BILIRUBIN: NEGATIVE
COLOR: YELLOW
GLUCOSE U: NEGATIVE
KETONES: NEGATIVE
LEUKOCYTES: NEGATIVE
NITRITE: NEGATIVE
OCCULT BLOOD: NEGATIVE
PH: 6
PROTEIN: NORMAL
URINE SPEC GRAVITY: 1.02
UROBILINOGEN: NORMAL

## 2017-07-05 ENCOUNTER — CHARTING TRANS (OUTPATIENT)
Dept: OTHER | Age: 32
End: 2017-07-05

## 2017-07-05 LAB — GP B STREP CULT/SENS (GBSCS) HL: NORMAL

## 2017-07-07 ENCOUNTER — HOSPITAL (OUTPATIENT)
Dept: OTHER | Age: 32
End: 2017-07-07
Attending: LEGAL MEDICINE

## 2017-07-07 LAB
ANALYZER ANC (IANC): ABNORMAL
BASE DEFICIT BLDCOA-SCNC: ABNORMAL MMOL/L
BASE DEFICIT BLDCOV-SCNC: 0 MMOL/L
BASE EXCESS BLDCOA CALC-SCNC: 0 MMOL/L
BASE EXCESS-RC: ABNORMAL
CONDITION: ABNORMAL
CONDITION: ABNORMAL
ERYTHROCYTE [DISTWIDTH] IN BLOOD: 14.4 % (ref 11–15)
GLUCOSE BLDC GLUCOMTR-MCNC: 102 MG/DL (ref 65–99)
GLUCOSE BLDC GLUCOMTR-MCNC: 104 MG/DL (ref 65–99)
HCO3 BLDCOA-SCNC: 29 MMOL/L (ref 21–28)
HCO3 BLDCOV-SCNC: 27 MMOL/L (ref 22–29)
HEMATOCRIT: 36 % (ref 36–46.5)
HGB BLD-MCNC: 11.9 GM/DL (ref 12–15.5)
MCH RBC QN AUTO: 27.5 PG (ref 26–34)
MCHC RBC AUTO-ENTMCNC: 33.1 GM/DL (ref 32–36.5)
MCV RBC AUTO: 83.3 FL (ref 78–100)
PCO2 BLDCOA: 64 MM HG (ref 31–74)
PCO2 BLDCOV: 57 MM HG (ref 23–49)
PH BLDCOA: 7.26 UNIT (ref 7.18–7.38)
PH BLDCOV: 7.29 UNIT (ref 7.25–7.45)
PLATELET # BLD: 150 THOUSAND/MCL (ref 140–450)
PO2 BLDCOV: 21 MM HG (ref 17–41)
PO2 RC ARTERIAL CORD (RACPO): <20 MM HG (ref 6–31)
RBC # BLD: 4.32 MILLION/MCL (ref 4–5.2)
WBC # BLD: 7.2 THOUSAND/MCL (ref 4.2–11)

## 2017-07-07 RX ORDER — LANCETS 33 GAUGE
EACH MISCELLANEOUS
Qty: 200 EACH | Refills: 11 | Status: SHIPPED | OUTPATIENT
Start: 2017-07-07 | End: 2018-10-22

## 2017-07-07 NOTE — TELEPHONE ENCOUNTER
Current Outpatient Prescriptions:     •  ONETOUCH DELICA LANCETS 98R Does not apply Misc, Use to check sugar 7 times daily as directed, Disp: 200 each, Rfl: 11   90 DAY SUPPLY

## 2017-07-08 LAB
ANALYZER ANC (IANC): ABNORMAL
CREAT SERPL-MCNC: 0.66 MG/DL (ref 0.51–0.95)
ERYTHROCYTE [DISTWIDTH] IN BLOOD: 14.6 % (ref 11–15)
GLUCOSE BLDC GLUCOMTR-MCNC: 108 MG/DL (ref 65–99)
GLUCOSE BLDC GLUCOMTR-MCNC: 112 MG/DL (ref 65–99)
GLUCOSE BLDC GLUCOMTR-MCNC: 137 MG/DL (ref 65–99)
GLUCOSE BLDC GLUCOMTR-MCNC: 88 MG/DL (ref 65–99)
HEMATOCRIT: 28.8 % (ref 36–46.5)
HGB BLD-MCNC: 9.4 GM/DL (ref 12–15.5)
MCH RBC QN AUTO: 27.6 PG (ref 26–34)
MCHC RBC AUTO-ENTMCNC: 32.6 GM/DL (ref 32–36.5)
MCV RBC AUTO: 84.5 FL (ref 78–100)
PLATELET # BLD: 132 THOUSAND/MCL (ref 140–450)
RBC # BLD: 3.41 MILLION/MCL (ref 4–5.2)
WBC # BLD: 7.1 THOUSAND/MCL (ref 4.2–11)

## 2017-07-09 LAB
ANALYZER ANC (IANC): ABNORMAL
ERYTHROCYTE [DISTWIDTH] IN BLOOD: 14.5 % (ref 11–15)
GLUCOSE BLDC GLUCOMTR-MCNC: 100 MG/DL (ref 65–99)
GLUCOSE BLDC GLUCOMTR-MCNC: 112 MG/DL (ref 65–99)
GLUCOSE BLDC GLUCOMTR-MCNC: 117 MG/DL (ref 65–99)
GLUCOSE BLDC GLUCOMTR-MCNC: 122 MG/DL (ref 65–99)
GLUCOSE BLDC GLUCOMTR-MCNC: 97 MG/DL (ref 65–99)
HEMATOCRIT: 26.9 % (ref 36–46.5)
HGB BLD-MCNC: 8.9 GM/DL (ref 12–15.5)
MCH RBC QN AUTO: 28.1 PG (ref 26–34)
MCHC RBC AUTO-ENTMCNC: 33.1 GM/DL (ref 32–36.5)
MCV RBC AUTO: 84.9 FL (ref 78–100)
PLATELET # BLD: 127 THOUSAND/MCL (ref 140–450)
RBC # BLD: 3.17 MILLION/MCL (ref 4–5.2)
WBC # BLD: 6.6 THOUSAND/MCL (ref 4.2–11)

## 2017-07-10 LAB
ANALYZER ANC (IANC): ABNORMAL
ERYTHROCYTE [DISTWIDTH] IN BLOOD: 14.4 % (ref 11–15)
GLUCOSE BLDC GLUCOMTR-MCNC: 93 MG/DL (ref 65–99)
HEMATOCRIT: 27.2 % (ref 36–46.5)
HGB BLD-MCNC: 9 GM/DL (ref 12–15.5)
MCH RBC QN AUTO: 28 PG (ref 26–34)
MCHC RBC AUTO-ENTMCNC: 33.1 GM/DL (ref 32–36.5)
MCV RBC AUTO: 84.5 FL (ref 78–100)
PLATELET # BLD: 161 THOUSAND/MCL (ref 140–450)
RBC # BLD: 3.22 MILLION/MCL (ref 4–5.2)
WBC # BLD: 7 THOUSAND/MCL (ref 4.2–11)

## 2017-07-18 ENCOUNTER — CHARTING TRANS (OUTPATIENT)
Dept: OTHER | Age: 32
End: 2017-07-18

## 2017-08-15 ENCOUNTER — CHARTING TRANS (OUTPATIENT)
Dept: OTHER | Age: 32
End: 2017-08-15

## 2017-08-28 RX ORDER — METFORMIN HYDROCHLORIDE 500 MG/1
500 TABLET, EXTENDED RELEASE ORAL 2 TIMES DAILY WITH MEALS
Qty: 60 TABLET | Refills: 5 | Status: SHIPPED | OUTPATIENT
Start: 2017-08-28 | End: 2017-12-12

## 2017-08-28 NOTE — TELEPHONE ENCOUNTER
Current Outpatient Prescriptions:  MetFORMIN HCl  MG Oral Tablet 24 Hr Take 1 tablet (500 mg total) by mouth 2 (two) times daily with meals.  Disp: 60 tablet Rfl: 5     Refill

## 2017-09-12 ENCOUNTER — OFFICE VISIT (OUTPATIENT)
Dept: ENDOCRINOLOGY CLINIC | Facility: CLINIC | Age: 32
End: 2017-09-12

## 2017-09-12 VITALS
HEART RATE: 96 BPM | BODY MASS INDEX: 45.95 KG/M2 | HEIGHT: 65 IN | DIASTOLIC BLOOD PRESSURE: 88 MMHG | SYSTOLIC BLOOD PRESSURE: 125 MMHG | WEIGHT: 275.81 LBS

## 2017-09-12 DIAGNOSIS — R73.01 ELEVATED FASTING GLUCOSE: Primary | ICD-10-CM

## 2017-09-12 LAB
CARTRIDGE LOT#: ABNORMAL NUMERIC
GLUCOSE BLOOD: 193
HEMOGLOBIN A1C: 7 % (ref 4.3–5.6)
TEST STRIP LOT #: NORMAL NUMERIC

## 2017-09-12 PROCEDURE — 36416 COLLJ CAPILLARY BLOOD SPEC: CPT | Performed by: INTERNAL MEDICINE

## 2017-09-12 PROCEDURE — 99214 OFFICE O/P EST MOD 30 MIN: CPT | Performed by: INTERNAL MEDICINE

## 2017-09-12 PROCEDURE — 83036 HEMOGLOBIN GLYCOSYLATED A1C: CPT | Performed by: INTERNAL MEDICINE

## 2017-09-12 PROCEDURE — 82962 GLUCOSE BLOOD TEST: CPT | Performed by: INTERNAL MEDICINE

## 2017-09-12 PROCEDURE — 99212 OFFICE O/P EST SF 10 MIN: CPT | Performed by: INTERNAL MEDICINE

## 2017-09-12 NOTE — PROGRESS NOTES
Name: Cecil Diaz  Date: 9/12/2017    Referring Physician: No ref. provider found    HISTORY OF PRESENT ILLNESS   Cecil Diaz is a 34year old female who presents for diabetes mellitus.   She was diagnosed with DM2 one year after delivery wi units with breakfast, 80 units around lunch, and 80 units in the evening, Disp: 200 mL, Rfl: 1  •  insulin aspart (NOVOLOG FLEXPEN) 100 UNIT/ML Subcutaneous Solution Pen-injector, Inject 45 units with breakfast, 50 units with lunch, and 65 units with dinne 5\" (1.651 m)   Wt 275 lb 12.8 oz (125.1 kg)   BMI 45.90 kg/m²     General Appearance:  alert, well developed, in no acute distress  Eyes:  normal conjunctivae, sclera. , normal sclera and normal pupils  Ears/Nose/Mouth/Throat/Neck:  no palpable thyroid nod

## 2017-10-30 RX ORDER — METFORMIN HYDROCHLORIDE 500 MG/1
500 TABLET, EXTENDED RELEASE ORAL 2 TIMES DAILY WITH MEALS
Qty: 60 TABLET | Refills: 4 | Status: SHIPPED | OUTPATIENT
Start: 2017-10-30 | End: 2018-08-09

## 2017-11-01 ENCOUNTER — PATIENT MESSAGE (OUTPATIENT)
Dept: FAMILY MEDICINE CLINIC | Facility: CLINIC | Age: 32
End: 2017-11-01

## 2017-11-02 ENCOUNTER — TELEPHONE (OUTPATIENT)
Dept: FAMILY MEDICINE CLINIC | Facility: CLINIC | Age: 32
End: 2017-11-02

## 2017-11-02 NOTE — TELEPHONE ENCOUNTER
From: Lakeisha De Jesus  To: Mark Spencer DO  Sent: 11/1/2017 9:07 AM CDT  Subject: Prescription Question    Hi Dr. Phani Carrasquillo,    I have had what I thought was a cold for nearly 3 weeks now.  Symptoms are congestion, slightly sore throat, my voice is sort

## 2017-11-02 NOTE — TELEPHONE ENCOUNTER
Pt stts she has to work so cannot make appt tomorrow and declines to any FM provider on Sat    Onset 3-4 weeks, cough with green-tote color phlegm, congestion is worse in morning and night, okay during the day  Voice is horse, has mild soreness from cough,

## 2017-11-02 NOTE — TELEPHONE ENCOUNTER
Prescription Question     From  Marko Marshall To Sent  11/1/2017  9:07 AM   Hi Dr. Rony Harrison,     I have had what I thought was a cold for nearly 3 weeks now.  Symptoms are congestion, slightly sore throat, my voice is sort of lost, head feels \"full\",

## 2017-11-03 NOTE — TELEPHONE ENCOUNTER
We have not seen her since last November so we would need to see her for an illness. I start work at 8:30 in the morning and I would be happy to have her worked in immediately at that time and then she can go on her way to work if she likes.   Otherwise sh

## 2017-11-03 NOTE — TELEPHONE ENCOUNTER
Spoke to pt states she did not receive message until now, and it is past 8:30 she would have liked to come in if she had known. Asking to be added to schedule today. No available slots. Suggested immediate care but pt. Declined.  Please advise if pt can be

## 2017-11-03 NOTE — TELEPHONE ENCOUNTER
Spoke with pt. Offered appt tomorrow in [facility], she declines. She will monitor her symptoms and notify clinic if she would like to schedule appt. Advised to contact clinic if symptoms progress. Report to ER if they become severe, or if CP or SOB develop.

## 2017-11-17 RX ORDER — LEVOTHYROXINE SODIUM 175 UG/1
175 TABLET ORAL
Qty: 30 TABLET | Refills: 2 | Status: SHIPPED | OUTPATIENT
Start: 2017-11-17 | End: 2018-04-19

## 2017-12-01 ENCOUNTER — PATIENT MESSAGE (OUTPATIENT)
Dept: ENDOCRINOLOGY CLINIC | Facility: CLINIC | Age: 32
End: 2017-12-01

## 2017-12-01 DIAGNOSIS — E03.9 HYPOTHYROIDISM, UNSPECIFIED TYPE: Primary | ICD-10-CM

## 2017-12-01 NOTE — TELEPHONE ENCOUNTER
From: Claudio Lopez  To: Juan Celestin MD  Sent: 12/1/2017 6:11 AM CST  Subject: Non-Urgent Hamzah Farah,    Should I get my thyroid tested before our appointment?  I don't think we checked it since my delivery

## 2017-12-08 ENCOUNTER — APPOINTMENT (OUTPATIENT)
Dept: LAB | Facility: HOSPITAL | Age: 32
End: 2017-12-08
Attending: INTERNAL MEDICINE
Payer: COMMERCIAL

## 2017-12-08 ENCOUNTER — APPOINTMENT (OUTPATIENT)
Dept: LAB | Facility: HOSPITAL | Age: 32
End: 2017-12-08
Attending: OBSTETRICS & GYNECOLOGY
Payer: COMMERCIAL

## 2017-12-08 DIAGNOSIS — E03.9 HYPOTHYROIDISM, UNSPECIFIED TYPE: ICD-10-CM

## 2017-12-08 PROCEDURE — 84439 ASSAY OF FREE THYROXINE: CPT

## 2017-12-08 PROCEDURE — 87624 HPV HI-RISK TYP POOLED RSLT: CPT | Performed by: OBSTETRICS & GYNECOLOGY

## 2017-12-08 PROCEDURE — 84443 ASSAY THYROID STIM HORMONE: CPT

## 2017-12-08 PROCEDURE — 88175 CYTOPATH C/V AUTO FLUID REDO: CPT | Performed by: OBSTETRICS & GYNECOLOGY

## 2017-12-08 PROCEDURE — 36415 COLL VENOUS BLD VENIPUNCTURE: CPT

## 2017-12-11 ENCOUNTER — APPOINTMENT (OUTPATIENT)
Dept: LAB | Facility: HOSPITAL | Age: 32
End: 2017-12-11
Attending: OBSTETRICS & GYNECOLOGY
Payer: COMMERCIAL

## 2017-12-11 PROCEDURE — 88305 TISSUE EXAM BY PATHOLOGIST: CPT | Performed by: OBSTETRICS & GYNECOLOGY

## 2017-12-12 ENCOUNTER — OFFICE VISIT (OUTPATIENT)
Dept: FAMILY MEDICINE CLINIC | Facility: CLINIC | Age: 32
End: 2017-12-12

## 2017-12-12 VITALS
WEIGHT: 284 LBS | SYSTOLIC BLOOD PRESSURE: 110 MMHG | BODY MASS INDEX: 47.32 KG/M2 | DIASTOLIC BLOOD PRESSURE: 70 MMHG | TEMPERATURE: 99 F | HEIGHT: 65 IN | HEART RATE: 102 BPM

## 2017-12-12 DIAGNOSIS — Z00.00 ADULT GENERAL MEDICAL EXAM: Primary | ICD-10-CM

## 2017-12-12 DIAGNOSIS — IMO0001 UNCONTROLLED DIABETES MELLITUS TYPE 2 WITHOUT COMPLICATIONS, UNSPECIFIED LONG TERM INSULIN USE STATUS: ICD-10-CM

## 2017-12-12 DIAGNOSIS — E06.3 HYPOTHYROIDISM DUE TO HASHIMOTO'S THYROIDITIS: ICD-10-CM

## 2017-12-12 DIAGNOSIS — E03.8 HYPOTHYROIDISM DUE TO HASHIMOTO'S THYROIDITIS: ICD-10-CM

## 2017-12-12 DIAGNOSIS — E66.01 MORBID OBESITY DUE TO EXCESS CALORIES (HCC): ICD-10-CM

## 2017-12-12 DIAGNOSIS — R09.81 NASAL CONGESTION: ICD-10-CM

## 2017-12-12 DIAGNOSIS — Z23 NEED FOR VACCINATION: ICD-10-CM

## 2017-12-12 PROCEDURE — 99395 PREV VISIT EST AGE 18-39: CPT | Performed by: FAMILY MEDICINE

## 2017-12-12 PROCEDURE — 90471 IMMUNIZATION ADMIN: CPT | Performed by: FAMILY MEDICINE

## 2017-12-12 PROCEDURE — 90686 IIV4 VACC NO PRSV 0.5 ML IM: CPT | Performed by: FAMILY MEDICINE

## 2017-12-12 PROCEDURE — 99212 OFFICE O/P EST SF 10 MIN: CPT | Performed by: FAMILY MEDICINE

## 2017-12-12 NOTE — PROGRESS NOTES
Patient ID: Pankaj Gregg is a 34year old female. HPI  Patient presents with:  Physical  She is here for physical exam.  She had a boy 5 months ago. She is a 1year-old daughter at home. She states last night was a rough night.       Her blood Respiratory: Negative for cough and shortness of breath. Cardiovascular: Negative for chest pain and palpitations. Gastrointestinal: Negative for abdominal pain, blood in stool and vomiting.    Endocrine: Negative for cold intolerance, heat intoleran (TWO) TIMES DAILY WITH MEALS.  Disp: 60 tablet Rfl: 4   ONETOUCH DELICA LANCETS 65D Does not apply Misc Use to check sugar 7 times daily as directed Disp: 200 each Rfl: 11   Glucose Blood (ONETOUCH VERIO) In Vitro Strip Use to check blood sugar up to 7 time 110/70   Pulse: 102    Temp: 98.5 °F (36.9 °C)    TempSrc: Oral    Weight: 284 lb (128.8 kg)    Height: 5' 5\" (1.651 m)             ASSESSMENT/PLAN:     Diagnoses and all orders for this visit:    Adult general medical exam  -     CBC WITH DIFFERENTIAL WI

## 2017-12-14 ENCOUNTER — OFFICE VISIT (OUTPATIENT)
Dept: ENDOCRINOLOGY CLINIC | Facility: CLINIC | Age: 32
End: 2017-12-14

## 2017-12-14 VITALS
HEART RATE: 101 BPM | HEIGHT: 65 IN | SYSTOLIC BLOOD PRESSURE: 129 MMHG | DIASTOLIC BLOOD PRESSURE: 86 MMHG | BODY MASS INDEX: 47.82 KG/M2 | WEIGHT: 287 LBS

## 2017-12-14 DIAGNOSIS — E11.65 CONTROLLED TYPE 2 DIABETES MELLITUS WITH HYPERGLYCEMIA, WITHOUT LONG-TERM CURRENT USE OF INSULIN (HCC): Primary | ICD-10-CM

## 2017-12-14 PROCEDURE — 83036 HEMOGLOBIN GLYCOSYLATED A1C: CPT | Performed by: INTERNAL MEDICINE

## 2017-12-14 PROCEDURE — 99214 OFFICE O/P EST MOD 30 MIN: CPT | Performed by: INTERNAL MEDICINE

## 2017-12-14 PROCEDURE — 36416 COLLJ CAPILLARY BLOOD SPEC: CPT | Performed by: INTERNAL MEDICINE

## 2017-12-14 PROCEDURE — 82962 GLUCOSE BLOOD TEST: CPT | Performed by: INTERNAL MEDICINE

## 2017-12-14 PROCEDURE — 99212 OFFICE O/P EST SF 10 MIN: CPT | Performed by: INTERNAL MEDICINE

## 2017-12-14 RX ORDER — GLIMEPIRIDE 2 MG/1
2 TABLET ORAL
Qty: 30 TABLET | Refills: 6 | Status: SHIPPED | OUTPATIENT
Start: 2017-12-14 | End: 2018-02-12

## 2017-12-14 RX ORDER — BLOOD SUGAR DIAGNOSTIC
STRIP MISCELLANEOUS
Qty: 200 STRIP | Refills: 2 | Status: SHIPPED | OUTPATIENT
Start: 2017-12-14 | End: 2018-10-22

## 2017-12-14 RX ORDER — LANCETS 33 GAUGE
EACH MISCELLANEOUS
Qty: 200 EACH | Refills: 2 | Status: SHIPPED | OUTPATIENT
Start: 2017-12-14

## 2017-12-15 NOTE — PATIENT INSTRUCTIONS
Start Levemir 30 units SQ bedtime    Start Glimepiride 2mg daily in the morning    Start Metformin ER 500mg 2 times per day

## 2017-12-15 NOTE — PROGRESS NOTES
Name: Claudio Lopez  Date: 12/14/2017    Referring Physician: No ref. provider found    HISTORY OF PRESENT ILLNESS   Claudio Lopez is a 34year old female who presents for diabetes mellitus.   She was diagnosed with DM2 one year after delivery w Oral Tab, Take 200 mg by mouth every 6 (six) hours as needed for Pain., Disp: , Rfl:   •  Albuterol Sulfate HFA (PROAIR HFA) 108 (90 BASE) MCG/ACT Inhalation Aero Soln, 2 puffs 3-4 times daily as needed. , Disp: 1 Inhaler, Rfl: 0     Allergies:     Pcn [Bic abdomen, organomegaly or tenderness   Musculoskeletal:  normal muscle strength and tone  Skin:  normal moisture and skin texture  Hair & Nails:  normal scalp hair     Hematologic:  no excessive bruising  Neuro:  sensory grossly intact and motor grossly int

## 2018-02-12 ENCOUNTER — TELEPHONE (OUTPATIENT)
Dept: ENDOCRINOLOGY CLINIC | Facility: CLINIC | Age: 33
End: 2018-02-12

## 2018-02-12 RX ORDER — GLIMEPIRIDE 2 MG/1
2 TABLET ORAL
Qty: 90 TABLET | Refills: 1 | Status: SHIPPED | OUTPATIENT
Start: 2018-02-12 | End: 2018-08-09

## 2018-02-12 NOTE — TELEPHONE ENCOUNTER
Pharmacy states pt is requesting a 90 day supply for Rx    glimepiride 2 MG Oral Tab.  Thank you         Current Outpatient Prescriptions:     •  glimepiride 2 MG Oral Tab, Take 1 tablet (2 mg total) by mouth daily with breakfast., Disp: 30 tablet, Rfl: 6

## 2018-04-19 RX ORDER — LEVOTHYROXINE SODIUM 175 UG/1
175 TABLET ORAL
Qty: 30 TABLET | Refills: 2 | Status: SHIPPED | OUTPATIENT
Start: 2018-04-19 | End: 2018-07-17

## 2018-06-18 RX ORDER — INSULIN DETEMIR 100 [IU]/ML
30 INJECTION, SOLUTION SUBCUTANEOUS NIGHTLY
Qty: 15 ML | Refills: 0 | Status: SHIPPED | OUTPATIENT
Start: 2018-06-18 | End: 2018-08-09

## 2018-06-19 ENCOUNTER — TELEPHONE (OUTPATIENT)
Dept: ENDOCRINOLOGY CLINIC | Facility: CLINIC | Age: 33
End: 2018-06-19

## 2018-06-19 NOTE — TELEPHONE ENCOUNTER
LOV 12/14/17. RTC 2 months. No F/U scheduled. Letter recently sent 6/19/18. Needs appt for 90 day refill. Called the patient. LMTCB.

## 2018-06-19 NOTE — TELEPHONE ENCOUNTER
Current Outpatient Prescriptions:  LEVEMIR FLEXTOUCH 100 UNIT/ML Subcutaneous Solution Pen-injector INJECT 30 UNITS INTO THE SKIN NIGHTLY.  Disp: 15 mL Rfl: 0     Insurance requires 90 day pls resend [employer]

## 2018-07-17 RX ORDER — LEVOTHYROXINE SODIUM 175 UG/1
175 TABLET ORAL
Qty: 30 TABLET | Refills: 0 | Status: SHIPPED | OUTPATIENT
Start: 2018-07-17 | End: 2021-03-25

## 2018-07-17 NOTE — TELEPHONE ENCOUNTER
LOV 12/14/17. RTC 2 months. No F/U scheduled. Letter sent 6/16/18. Called the patient today. LDM that she is due for an appt. 1 month refill sent per [facility] protocol.

## 2018-07-31 NOTE — TELEPHONE ENCOUNTER
Unable to fill per  [facility] protocol    LOV 12/14/17. RTC 2 months. No F/U scheduled. Letter sent 6/16/18. Called the patient today. LDM that she is due for an appt.

## 2018-08-01 RX ORDER — LEVOTHYROXINE SODIUM 175 UG/1
175 TABLET ORAL
Qty: 30 TABLET | Refills: 2 | Status: SHIPPED | OUTPATIENT
Start: 2018-08-01 | End: 2018-11-15

## 2018-08-09 ENCOUNTER — OFFICE VISIT (OUTPATIENT)
Dept: ENDOCRINOLOGY CLINIC | Facility: CLINIC | Age: 33
End: 2018-08-09
Payer: COMMERCIAL

## 2018-08-09 ENCOUNTER — PATIENT MESSAGE (OUTPATIENT)
Dept: ENDOCRINOLOGY CLINIC | Facility: CLINIC | Age: 33
End: 2018-08-09

## 2018-08-09 VITALS
HEART RATE: 120 BPM | WEIGHT: 284 LBS | SYSTOLIC BLOOD PRESSURE: 118 MMHG | DIASTOLIC BLOOD PRESSURE: 85 MMHG | BODY MASS INDEX: 47 KG/M2

## 2018-08-09 DIAGNOSIS — E11.65 TYPE 2 DIABETES MELLITUS WITH HYPERGLYCEMIA, UNSPECIFIED WHETHER LONG TERM INSULIN USE (HCC): Primary | ICD-10-CM

## 2018-08-09 LAB
CARTRIDGE LOT#: ABNORMAL NUMERIC
GLUCOSE BLOOD: 183
HEMOGLOBIN A1C: 10.5 % (ref 4.3–5.6)
TEST STRIP LOT #: NORMAL NUMERIC

## 2018-08-09 PROCEDURE — 83036 HEMOGLOBIN GLYCOSYLATED A1C: CPT | Performed by: INTERNAL MEDICINE

## 2018-08-09 PROCEDURE — 99212 OFFICE O/P EST SF 10 MIN: CPT | Performed by: INTERNAL MEDICINE

## 2018-08-09 PROCEDURE — 82962 GLUCOSE BLOOD TEST: CPT | Performed by: INTERNAL MEDICINE

## 2018-08-09 PROCEDURE — 99214 OFFICE O/P EST MOD 30 MIN: CPT | Performed by: INTERNAL MEDICINE

## 2018-08-09 PROCEDURE — 36416 COLLJ CAPILLARY BLOOD SPEC: CPT | Performed by: INTERNAL MEDICINE

## 2018-08-09 RX ORDER — GLIMEPIRIDE 4 MG/1
4 TABLET ORAL
Qty: 90 TABLET | Refills: 1 | Status: SHIPPED | OUTPATIENT
Start: 2018-08-09 | End: 2019-08-27

## 2018-08-09 RX ORDER — METFORMIN HYDROCHLORIDE 500 MG/1
500 TABLET, EXTENDED RELEASE ORAL 2 TIMES DAILY WITH MEALS
Qty: 60 TABLET | Refills: 4 | Status: SHIPPED | OUTPATIENT
Start: 2018-08-09 | End: 2019-01-16

## 2018-08-09 NOTE — PROGRESS NOTES
Name: Cuca Avila  Date: 8/9/2018    Referring Physician: No ref. provider found    HISTORY OF PRESENT ILLNESS   Cuca Avila is a 28year old female who presents for diabetes mellitus.   She was diagnosed with DM2 one year after delivery wit FLEXTOUCH 100 UNIT/ML Subcutaneous Solution Pen-injector, INJECT 30 UNITS INTO THE SKIN NIGHTLY., Disp: 15 mL, Rfl: 0  •  glimepiride 2 MG Oral Tab, Take 1 tablet (2 mg total) by mouth daily with breakfast., Disp: 90 tablet, Rfl: 1  •  Insulin Pen Needle ( Gestational diabetes mellitus    • Hypothyroidism     supplement   • Migraines     Per [employer]: Migraines +Pos   • Primigravida    • Pulmonary disorder     Per [employer]: Pulmonary (TB, Asthma) +Pos       Surgical history:   Past Surgical History:  No date: EPIC  -Continue Metformin ER 500mg PO BID  -Start Victoza 1.8mg SQ daily, verbalized understanding of risks and benefits  -Demonstrated Victoza pen during visit   -Discussed trying to increase exercise and watching diet   -She is having some difficulty wit

## 2018-08-09 NOTE — PATIENT INSTRUCTIONS
Increase Levemir to 40 units SQ daily    Increase Glimepiride to 4mg daily     Start Victoza 0.6mg SQ daily for one week then increase to 1.2mg SQ daily for one week then increase to 1.8mg SQ daily    Continue Metformin

## 2018-08-27 NOTE — TELEPHONE ENCOUNTER
Ok to stop Victoza, lets try Bydureon as any alternative 2mg SQ Weekly and give 3 month supply. Thanks.

## 2018-10-21 ENCOUNTER — TELEPHONE (OUTPATIENT)
Dept: ENDOCRINOLOGY CLINIC | Facility: CLINIC | Age: 33
End: 2018-10-21

## 2018-10-21 NOTE — TELEPHONE ENCOUNTER
Patient paged to state that she was pregnant, discussed importance of restarting MDI for pregnancy. Start Levemir 50 units SQ daily and Novolog 12 units SQ TID with meals.      Endo RN - please call on Monday to make sure she got script and check on BG lev

## 2018-10-22 ENCOUNTER — PATIENT MESSAGE (OUTPATIENT)
Dept: ENDOCRINOLOGY CLINIC | Facility: CLINIC | Age: 33
End: 2018-10-22

## 2018-10-22 RX ORDER — METFORMIN HYDROCHLORIDE 500 MG/1
500 TABLET, EXTENDED RELEASE ORAL 2 TIMES DAILY WITH MEALS
Qty: 60 TABLET | Refills: 1 | Status: SHIPPED | OUTPATIENT
Start: 2018-10-22 | End: 2019-02-01

## 2018-10-22 RX ORDER — BLOOD SUGAR DIAGNOSTIC
STRIP MISCELLANEOUS
Qty: 400 STRIP | Refills: 0 | Status: SHIPPED | OUTPATIENT
Start: 2018-10-22 | End: 2019-07-12

## 2018-10-22 RX ORDER — LANCETS 33 GAUGE
EACH MISCELLANEOUS
Qty: 400 EACH | Refills: 0 | Status: SHIPPED | OUTPATIENT
Start: 2018-10-22 | End: 2019-07-12

## 2018-10-22 NOTE — TELEPHONE ENCOUNTER
Spoke with the patient. She is feeling very scared. She just found out she was pregnant on Saturday and was given orders by Dr. Emigdio Duke on Sunday and started regimen below. Took 50 units levemir last night. Confirmed doses.  Patient did not eat carbs yesterda

## 2018-10-22 NOTE — TELEPHONE ENCOUNTER
Sent patient instructions via [employer] per patient request. Will await response with her sliding scale.

## 2018-10-22 NOTE — TELEPHONE ENCOUNTER
Please ask her to increase Novolog to 18 units SQ TID with meals and send her a copy of scale. 1:40>140 and increase Levemir to 60 units SQ QHS. Ask her to send sugars again on Wednesday. Thanks.

## 2018-10-26 ENCOUNTER — OFFICE VISIT (OUTPATIENT)
Dept: ENDOCRINOLOGY CLINIC | Facility: CLINIC | Age: 33
End: 2018-10-26
Payer: COMMERCIAL

## 2018-10-26 ENCOUNTER — TELEPHONE (OUTPATIENT)
Dept: ENDOCRINOLOGY CLINIC | Facility: CLINIC | Age: 33
End: 2018-10-26

## 2018-10-26 VITALS
DIASTOLIC BLOOD PRESSURE: 87 MMHG | HEART RATE: 100 BPM | WEIGHT: 284.38 LBS | BODY MASS INDEX: 47 KG/M2 | RESPIRATION RATE: 22 BRPM | SYSTOLIC BLOOD PRESSURE: 127 MMHG

## 2018-10-26 DIAGNOSIS — E06.3 HYPOTHYROIDISM DUE TO HASHIMOTO'S THYROIDITIS: ICD-10-CM

## 2018-10-26 DIAGNOSIS — O24.419 GESTATIONAL DIABETES MELLITUS (GDM), ANTEPARTUM, GESTATIONAL DIABETES METHOD OF CONTROL UNSPECIFIED: ICD-10-CM

## 2018-10-26 DIAGNOSIS — O24.414 INSULIN CONTROLLED GESTATIONAL DIABETES MELLITUS (GDM) IN FIRST TRIMESTER: ICD-10-CM

## 2018-10-26 DIAGNOSIS — E03.8 HYPOTHYROIDISM DUE TO HASHIMOTO'S THYROIDITIS: ICD-10-CM

## 2018-10-26 DIAGNOSIS — E03.9 HYPOTHYROIDISM, UNSPECIFIED TYPE: ICD-10-CM

## 2018-10-26 DIAGNOSIS — O24.911 DIABETES MELLITUS AFFECTING PREGNANCY IN FIRST TRIMESTER: Primary | ICD-10-CM

## 2018-10-26 PROCEDURE — 99212 OFFICE O/P EST SF 10 MIN: CPT | Performed by: NURSE PRACTITIONER

## 2018-10-26 PROCEDURE — 36416 COLLJ CAPILLARY BLOOD SPEC: CPT | Performed by: NURSE PRACTITIONER

## 2018-10-26 PROCEDURE — 99213 OFFICE O/P EST LOW 20 MIN: CPT | Performed by: NURSE PRACTITIONER

## 2018-10-26 PROCEDURE — 83036 HEMOGLOBIN GLYCOSYLATED A1C: CPT | Performed by: NURSE PRACTITIONER

## 2018-10-26 PROCEDURE — 82962 GLUCOSE BLOOD TEST: CPT | Performed by: NURSE PRACTITIONER

## 2018-10-26 NOTE — PROGRESS NOTES
Name: Kurt Coronel  Date:10/26/2018    Referring Physician: No ref. provider found    HISTORY OF PRESENT ILLNESS   Kurt Coronel is a 28year old female who presents for diabetes mellitus, she is 5 weeks pregnant.   She was diagnosed with DM2 o Nephropathy present: No    Neuro: Neuropathy present: No    Skin: Infection or ulceration: No    Osteoporosis: No    Thyroid disease: Yes, hypothyroidism diagnosed during the first pregnancy.   She is currently maintained on LT4 175mcg PO daily, taking medi Rfl: 0  •  Insulin Pen Needle (BD PEN NEEDLE MARIA G U/F) 32G X 4 MM Does not apply Misc, Inject once daily, Disp: 100 each, Rfl: 1  •  ONETOUCH DELICA LANCETS 54H Does not apply Misc, Check 2 times per day, Disp: 200 each, Rfl: 2  •  Glucose Blood (ONETOUCH Not Asked        Self-Exams: Not Asked    Social History Narrative      Not on file      Medical History:   Past Medical History:   Diagnosis Date   • Gestational diabetes mellitus    • Hypothyroidism     supplement   • Migraines     Per [employer]: Migraines 15-25 lbs  -Diabetic Medications:   -Increase Levemir to 84 SC units at bedtime  Novolog 30 units TID plus SS 1:40 > 140  140-179- 1   180-219- 2   220-259-3   260-299-4   300-339-5   -Reviewed Insulin pump therapy options/ Patient to review and discuss at

## 2018-10-26 NOTE — TELEPHONE ENCOUNTER
Reviewed with Patient need for Lab follow up - Pt reports had done at outside lab /to bring at next visit.    Patient has reviewed pump information given this am / decided she would prefer the [employer] insulin pump     Called [employer] - started process fo

## 2018-10-29 ENCOUNTER — TELEPHONE (OUTPATIENT)
Dept: ENDOCRINOLOGY CLINIC | Facility: CLINIC | Age: 33
End: 2018-10-29

## 2018-10-29 DIAGNOSIS — Z79.4 UNCONTROLLED TYPE 2 DIABETES MELLITUS WITH COMPLICATION, WITH LONG-TERM CURRENT USE OF INSULIN (HCC): Primary | ICD-10-CM

## 2018-10-29 DIAGNOSIS — E11.8 UNCONTROLLED TYPE 2 DIABETES MELLITUS WITH COMPLICATION, WITH LONG-TERM CURRENT USE OF INSULIN (HCC): Primary | ICD-10-CM

## 2018-10-29 DIAGNOSIS — E11.65 UNCONTROLLED TYPE 2 DIABETES MELLITUS WITH COMPLICATION, WITH LONG-TERM CURRENT USE OF INSULIN (HCC): Primary | ICD-10-CM

## 2018-10-29 RX ORDER — SUBCUTANEOUS INSULIN PUMP
1 EACH MISCELLANEOUS DAILY
Qty: 1 KIT | Refills: 0 | Status: SHIPPED | OUTPATIENT
Start: 2018-10-29 | End: 2019-08-27

## 2018-10-29 RX ORDER — SUBCUTANEOUS INSULIN PUMP
1 EACH MISCELLANEOUS DAILY
Qty: 1 DEVICE | Refills: 0 | Status: SHIPPED | OUTPATIENT
Start: 2018-10-29 | End: 2019-08-27

## 2018-10-29 NOTE — TELEPHONE ENCOUNTER
Prescriptions for insulin pump for pregnancy printed for [facility] to sign

## 2018-10-29 NOTE — TELEPHONE ENCOUNTER
Called patient regarding elevated blood sugars mychart message   Left message on mobile and home to return call also sent message on my chart. Patient to increase Levemir and please send in more blood sugar readings to follow.      Also follow up with i

## 2018-11-01 ENCOUNTER — TELEPHONE (OUTPATIENT)
Dept: ENDOCRINOLOGY CLINIC | Facility: CLINIC | Age: 33
End: 2018-11-01

## 2018-11-01 NOTE — TELEPHONE ENCOUNTER
CMN form received for insulin pump from [employer] [facility]. Completed and faxed with office notes to Cristian Potter to expedite. Scanned to chart.

## 2018-11-03 VITALS
HEIGHT: 66 IN | DIASTOLIC BLOOD PRESSURE: 80 MMHG | WEIGHT: 293 LBS | BODY MASS INDEX: 47.09 KG/M2 | SYSTOLIC BLOOD PRESSURE: 130 MMHG

## 2018-11-03 VITALS
WEIGHT: 293 LBS | DIASTOLIC BLOOD PRESSURE: 82 MMHG | BODY MASS INDEX: 47.09 KG/M2 | HEART RATE: 72 BPM | HEIGHT: 66 IN | SYSTOLIC BLOOD PRESSURE: 120 MMHG

## 2018-11-03 VITALS
DIASTOLIC BLOOD PRESSURE: 82 MMHG | SYSTOLIC BLOOD PRESSURE: 130 MMHG | BODY MASS INDEX: 46.12 KG/M2 | HEIGHT: 66 IN | HEART RATE: 80 BPM | WEIGHT: 287 LBS

## 2018-11-03 VITALS
BODY MASS INDEX: 47.09 KG/M2 | HEART RATE: 75 BPM | WEIGHT: 293 LBS | DIASTOLIC BLOOD PRESSURE: 86 MMHG | SYSTOLIC BLOOD PRESSURE: 130 MMHG | HEIGHT: 66 IN

## 2018-11-03 VITALS
HEIGHT: 66 IN | DIASTOLIC BLOOD PRESSURE: 82 MMHG | WEIGHT: 293 LBS | BODY MASS INDEX: 47.09 KG/M2 | SYSTOLIC BLOOD PRESSURE: 132 MMHG

## 2018-11-03 VITALS
SYSTOLIC BLOOD PRESSURE: 118 MMHG | WEIGHT: 287.25 LBS | HEIGHT: 66 IN | HEART RATE: 78 BPM | BODY MASS INDEX: 46.16 KG/M2 | DIASTOLIC BLOOD PRESSURE: 78 MMHG

## 2018-11-03 VITALS
DIASTOLIC BLOOD PRESSURE: 80 MMHG | HEART RATE: 74 BPM | SYSTOLIC BLOOD PRESSURE: 130 MMHG | HEIGHT: 66 IN | WEIGHT: 293 LBS | BODY MASS INDEX: 47.09 KG/M2

## 2018-11-03 VITALS
DIASTOLIC BLOOD PRESSURE: 80 MMHG | WEIGHT: 279.99 LBS | SYSTOLIC BLOOD PRESSURE: 122 MMHG | BODY MASS INDEX: 45 KG/M2 | HEIGHT: 66 IN

## 2018-11-03 VITALS
HEIGHT: 66 IN | BODY MASS INDEX: 47.09 KG/M2 | DIASTOLIC BLOOD PRESSURE: 82 MMHG | SYSTOLIC BLOOD PRESSURE: 134 MMHG | HEART RATE: 73 BPM | WEIGHT: 293 LBS

## 2018-11-03 VITALS — SYSTOLIC BLOOD PRESSURE: 142 MMHG | WEIGHT: 277.19 LBS | DIASTOLIC BLOOD PRESSURE: 90 MMHG

## 2018-11-04 VITALS
DIASTOLIC BLOOD PRESSURE: 60 MMHG | BODY MASS INDEX: 45.48 KG/M2 | WEIGHT: 282.99 LBS | SYSTOLIC BLOOD PRESSURE: 110 MMHG | HEIGHT: 66 IN

## 2018-11-05 VITALS
DIASTOLIC BLOOD PRESSURE: 70 MMHG | SYSTOLIC BLOOD PRESSURE: 110 MMHG | WEIGHT: 256.49 LBS | HEART RATE: 78 BPM | HEIGHT: 66 IN | BODY MASS INDEX: 41.22 KG/M2

## 2018-11-05 VITALS
HEIGHT: 66 IN | BODY MASS INDEX: 42.75 KG/M2 | SYSTOLIC BLOOD PRESSURE: 118 MMHG | WEIGHT: 265.99 LBS | DIASTOLIC BLOOD PRESSURE: 74 MMHG

## 2018-11-05 VITALS — WEIGHT: 270.4 LBS | SYSTOLIC BLOOD PRESSURE: 116 MMHG | DIASTOLIC BLOOD PRESSURE: 72 MMHG

## 2018-11-14 ENCOUNTER — TELEPHONE (OUTPATIENT)
Dept: ENDOCRINOLOGY CLINIC | Facility: CLINIC | Age: 33
End: 2018-11-14

## 2018-11-14 ENCOUNTER — PATIENT MESSAGE (OUTPATIENT)
Dept: ENDOCRINOLOGY CLINIC | Facility: CLINIC | Age: 33
End: 2018-11-14

## 2018-11-14 NOTE — TELEPHONE ENCOUNTER
LMTCB- no answer. Booked patient in 4:30 spot while awaiting call back. If doesn't want this spot can offer 3pm RN approval as well.

## 2018-11-14 NOTE — TELEPHONE ENCOUNTER
From: Matheus Jung  To: Roxanna Wright MD  Sent: 11/14/2018 7:58 AM CST  Subject: Other    Hi Dr. Gia Melgoza,    I finally received tracking info for the pump and it should arrive today. Ruma Manrique said we'd be able to meet tomorrow evening.  Because if that, s

## 2018-11-14 NOTE — TELEPHONE ENCOUNTER
Patient works 12-9p on Monday so cannot make 4:30p appt. She prefers 10 am - FYI you are already double booked at 6.

## 2018-11-14 NOTE — TELEPHONE ENCOUNTER
No unfortunately I can't add any more patients to the morning. I guess she could see Luis Martin on Monday at 11:20. Thanks.

## 2018-11-14 NOTE — TELEPHONE ENCOUNTER
Pt would like to know if she should reschedule her Friday 11/16/18 appt to Monday 11/19/18 (see pt email also)? She is also coming in on Thurs 11/15/18 for pump training with Earnest Fire. Please call.

## 2018-11-15 ENCOUNTER — PATIENT MESSAGE (OUTPATIENT)
Dept: ENDOCRINOLOGY CLINIC | Facility: CLINIC | Age: 33
End: 2018-11-15

## 2018-11-15 RX ORDER — LEVOTHYROXINE SODIUM 175 UG/1
175 TABLET ORAL
Qty: 30 TABLET | Refills: 2 | Status: SHIPPED | OUTPATIENT
Start: 2018-11-15 | End: 2019-02-08

## 2018-11-15 NOTE — TELEPHONE ENCOUNTER
From: Natalya Weber  To: Shruti Stevenson MD  Sent: 11/15/2018 8:19 AM CST  Subject: Prescription Question    Hi there, can you send a refill for the levothyroxine 175mcg? I only have a few left, thank you!

## 2018-11-15 NOTE — TELEPHONE ENCOUNTER
LOV 10/26/18 w/ LG  LOV 8/9/18 w/ SH - F/U 11/19/18 w/ SH    Pt requesting refill of Levothyroxine 175 mcg    Component      Latest Ref Rng & Units 12/8/2017   TSH      0.45 - 5.33 uIU/mL 3.91   T4,Free (Direct)      0.58 - 1.64 ng/dL 1.03

## 2018-11-16 ENCOUNTER — TELEPHONE (OUTPATIENT)
Dept: ENDOCRINOLOGY CLINIC | Facility: CLINIC | Age: 33
End: 2018-11-16

## 2018-11-16 ENCOUNTER — PATIENT MESSAGE (OUTPATIENT)
Dept: ENDOCRINOLOGY CLINIC | Facility: CLINIC | Age: 33
End: 2018-11-16

## 2018-11-16 NOTE — TELEPHONE ENCOUNTER
From: Earl Jerez  To:  Preet Chaudhari MD  Sent: 11/15/2018 7:53 PM CST  Subject: Visit Esther Costello,    It's not letting me reply to the other message to cancel the Monday but if there's a Wednesday morning at 9am I can do that

## 2018-11-16 NOTE — TELEPHONE ENCOUNTER
From: Kurt Coronel  To: Walter Guadalupe MD  Sent: 11/16/2018 8:57 AM CST  Subject: Test Results Question    Good morning,     My fasting today was 171 which is higher than what it was on the levemir, do I need to adjust the doses?

## 2018-11-16 NOTE — TELEPHONE ENCOUNTER
Pt states she is pregnant and started pump n states her sugars are extremely high please call thank you (sugars over 200)

## 2018-11-17 ENCOUNTER — PATIENT MESSAGE (OUTPATIENT)
Dept: ENDOCRINOLOGY CLINIC | Facility: CLINIC | Age: 33
End: 2018-11-17

## 2018-11-19 ENCOUNTER — TELEPHONE (OUTPATIENT)
Dept: ENDOCRINOLOGY CLINIC | Facility: CLINIC | Age: 33
End: 2018-11-19

## 2018-11-19 ENCOUNTER — OFFICE VISIT (OUTPATIENT)
Dept: ENDOCRINOLOGY CLINIC | Facility: CLINIC | Age: 33
End: 2018-11-19
Payer: COMMERCIAL

## 2018-11-19 VITALS
SYSTOLIC BLOOD PRESSURE: 119 MMHG | DIASTOLIC BLOOD PRESSURE: 82 MMHG | HEART RATE: 93 BPM | WEIGHT: 287.19 LBS | BODY MASS INDEX: 48 KG/M2

## 2018-11-19 DIAGNOSIS — E11.65 UNCONTROLLED TYPE 2 DIABETES MELLITUS WITH HYPERGLYCEMIA (HCC): Primary | ICD-10-CM

## 2018-11-19 PROCEDURE — 83036 HEMOGLOBIN GLYCOSYLATED A1C: CPT | Performed by: INTERNAL MEDICINE

## 2018-11-19 PROCEDURE — 36416 COLLJ CAPILLARY BLOOD SPEC: CPT | Performed by: INTERNAL MEDICINE

## 2018-11-19 PROCEDURE — 82962 GLUCOSE BLOOD TEST: CPT | Performed by: INTERNAL MEDICINE

## 2018-11-19 PROCEDURE — 99214 OFFICE O/P EST MOD 30 MIN: CPT | Performed by: INTERNAL MEDICINE

## 2018-11-19 PROCEDURE — 99212 OFFICE O/P EST SF 10 MIN: CPT | Performed by: INTERNAL MEDICINE

## 2018-11-19 NOTE — TELEPHONE ENCOUNTER
From: Ata Crane  To: Nell Massey MD  Sent: 11/17/2018 12:55 PM CST  Subject: Test Results Question    Candace Qureshi my morning fasting was 95! Lowest it's been!  It's still not letting me do corrections though to get the sugar lowered so I just checked af No

## 2018-11-19 NOTE — PROGRESS NOTES
Name: Claudio Lopez  Date: 11/19/2018    Referring Physician: No ref. provider found    HISTORY OF PRESENT ILLNESS   Claudio Lopez is a 28year old female who presents for diabetes mellitus.   She was diagnosed with DM2 one year after delivery w LANCETS Does not apply Misc, Check sugars 6 times daily, Disp: 600 each, Rfl: 5  •  Prenatal Vit-DSS-Fe Cbn-FA (PRENATAL AD) Oral Tab, Take 1 tablet by mouth daily. , Disp: , Rfl:   •  insulin regular human, conc, (HUMULIN R U-500, CONCENTRATED,) 500 UNIT/M tablet, Rfl: 4  •  glimepiride 4 MG Oral Tab, Take 1 tablet (4 mg total) by mouth every morning before breakfast., Disp: 90 tablet, Rfl: 1  •  ibuprofen 200 MG Oral Tab, Take 200 mg by mouth every 6 (six) hours as needed for Pain., Disp: , Rfl:   •  [place] supplement   • Migraines     Per [employer]: Migraines +Pos   • Primigravida    • Pulmonary disorder     Per [employer]: Pulmonary (TB, Asthma) +Pos       Surgical history:   Past Surgical History:   Procedure Laterality Date   •      • KNEE SURGERY Rig above  -Recheck full set of labs at the end of the month     2. Hypothyroidism  -Continue LT4 175mcg PO daily   -Normalized TFTs    This is a 25 minute visit and greater than 50% of the time was spent counseling the patient and/or coordinating care.     RTC

## 2018-11-20 ENCOUNTER — TELEPHONE (OUTPATIENT)
Dept: ENDOCRINOLOGY CLINIC | Facility: CLINIC | Age: 33
End: 2018-11-20

## 2018-11-20 NOTE — TELEPHONE ENCOUNTER
Yamileth/JAMES called to find out when pt was seen in office last and also when pt had labs done. Please call.

## 2018-11-28 ENCOUNTER — PATIENT MESSAGE (OUTPATIENT)
Dept: ENDOCRINOLOGY CLINIC | Facility: CLINIC | Age: 33
End: 2018-11-28

## 2018-11-28 RX ORDER — INSULIN ASPART 100 [IU]/ML
INJECTION, SOLUTION INTRAVENOUS; SUBCUTANEOUS
Qty: 15 ML | Refills: 6 | Status: SHIPPED | OUTPATIENT
Start: 2018-11-28 | End: 2019-06-16

## 2018-11-28 NOTE — TELEPHONE ENCOUNTER
RN requesting more detailed BG log - will await response. RN clarifies for pt not to stack insulin. Pt on U-500 via insulin pump. Pt is pregnant and reports this morning BB  and post breakfast was 202. Last A1C 8.1.

## 2018-11-28 NOTE — TELEPHONE ENCOUNTER
From: Homer Chambers  To: Lorri Kurtz MD  Sent: 11/28/2018 10:18 AM CST  Subject: Test Results Question    Hi Dr. Priya Vizcaino,     My fastings have been higher. Today 164.  Im not sure if it's the progesterone at night or my schedule where certain days din

## 2018-11-28 NOTE — TELEPHONE ENCOUNTER
From: Pankaj Gregg  To:  Ann Burnett MD  Sent: 11/28/2018 12:53 PM CST  Subject: Test Results Question    My after meals have been creeping up as well, my after breakfast just now was 202 and I'm putting in the carbs based on the labels, should I m

## 2018-11-28 NOTE — TELEPHONE ENCOUNTER
RN responded to other message from today. Closed this enc. Please see other [employer] message from today 11/28/18.

## 2018-11-28 NOTE — TELEPHONE ENCOUNTER
Per SH, pt is to set temp basal on pump on 125% and call/come in tomorrow for further instructions. RN sent pt message to do so- will await response. Pt c/o hyperglycemia. Pt is taking progesterone at night per mychart message as well.

## 2018-11-29 ENCOUNTER — PATIENT MESSAGE (OUTPATIENT)
Dept: ENDOCRINOLOGY CLINIC | Facility: CLINIC | Age: 33
End: 2018-11-29

## 2018-11-29 ENCOUNTER — NURSE ONLY (OUTPATIENT)
Dept: ENDOCRINOLOGY CLINIC | Facility: CLINIC | Age: 33
End: 2018-11-29
Payer: COMMERCIAL

## 2018-11-29 DIAGNOSIS — E11.65 UNCONTROLLED TYPE 2 DIABETES MELLITUS WITH HYPERGLYCEMIA (HCC): Primary | ICD-10-CM

## 2018-11-29 NOTE — TELEPHONE ENCOUNTER
JENNIFER Reid,    Pt will come to clinic [facility] for pump adjustment before 6pm as requested. RN apt booked. Temp basal 125% set yesterday @5:30pm. After dinner BG was 197 last evening.     This morning BB , 2 hrs after breakfast 117

## 2018-11-29 NOTE — TELEPHONE ENCOUNTER
From: Apryl Hickey RN  To: Claudio Lopez  Sent: 11/29/2018 10:04 AM CST  Subject: Checking In    Destini,    It's Swetha at Dr LYDIA QUIJANO [facility] office just checking to see how your sugars are doing this morning.  Also, wanted to know if you could stop

## 2018-11-30 ENCOUNTER — LAB ENCOUNTER (OUTPATIENT)
Dept: LAB | Facility: HOSPITAL | Age: 33
End: 2018-11-30
Attending: NURSE PRACTITIONER
Payer: COMMERCIAL

## 2018-11-30 ENCOUNTER — TELEPHONE (OUTPATIENT)
Dept: OBGYN CLINIC | Facility: CLINIC | Age: 33
End: 2018-11-30

## 2018-11-30 DIAGNOSIS — O24.414 INSULIN CONTROLLED GESTATIONAL DIABETES MELLITUS (GDM) IN FIRST TRIMESTER: ICD-10-CM

## 2018-11-30 DIAGNOSIS — Z00.00 ADULT GENERAL MEDICAL EXAM: ICD-10-CM

## 2018-11-30 DIAGNOSIS — O24.911 DIABETES MELLITUS AFFECTING PREGNANCY IN FIRST TRIMESTER: ICD-10-CM

## 2018-11-30 DIAGNOSIS — E03.9 HYPOTHYROIDISM, UNSPECIFIED TYPE: ICD-10-CM

## 2018-11-30 PROCEDURE — 36415 COLL VENOUS BLD VENIPUNCTURE: CPT

## 2018-11-30 PROCEDURE — 82570 ASSAY OF URINE CREATININE: CPT

## 2018-11-30 PROCEDURE — 82043 UR ALBUMIN QUANTITATIVE: CPT

## 2018-11-30 PROCEDURE — 84443 ASSAY THYROID STIM HORMONE: CPT

## 2018-11-30 PROCEDURE — 84439 ASSAY OF FREE THYROXINE: CPT

## 2018-11-30 PROCEDURE — 85025 COMPLETE CBC W/AUTO DIFF WBC: CPT

## 2018-11-30 PROCEDURE — 80061 LIPID PANEL: CPT

## 2018-11-30 PROCEDURE — 80053 COMPREHEN METABOLIC PANEL: CPT

## 2018-11-30 PROCEDURE — 83036 HEMOGLOBIN GLYCOSYLATED A1C: CPT

## 2018-11-30 NOTE — TELEPHONE ENCOUNTER
Pt states she needs to transfer from Fertility to OB group. Pt states she is considered high risk due to being on a insulin pump.  Informed pt our group does not see PN pts that have a insulin pump and we transfer PN pt with insulin pumps to a tertiary hosp

## 2018-11-30 NOTE — PROGRESS NOTES
Patient here for DM download. Settings adjusted as follows:      Basal:  12A-1.00-->1.20    Bolus:  I:CR  12A- 8.0    Sensitivity- 40  Active insulin time- 4    Patient has follow up scheduled with  in one month.

## 2018-12-05 ENCOUNTER — TELEPHONE (OUTPATIENT)
Dept: OBGYN | Age: 33
End: 2018-12-05

## 2018-12-08 ENCOUNTER — PATIENT MESSAGE (OUTPATIENT)
Dept: ENDOCRINOLOGY CLINIC | Facility: CLINIC | Age: 33
End: 2018-12-08

## 2018-12-10 NOTE — TELEPHONE ENCOUNTER
[id number] Scarlett Jonas, thanks. Do we have her pump log in info to download? Would you ask her to download at home so we can take a look in "[employer]. Thanks.

## 2018-12-10 NOTE — TELEPHONE ENCOUNTER
From: Yamileth Sheehan  To: Frank Yuan MD  Sent: 12/8/2018 10:34 PM CST  Subject: Test Results Question    Hi there, I had a couple highs today post meal but I think it was more due to me miscalculating the carbs.  We had 2 family parties so I was goo

## 2018-12-10 NOTE — TELEPHONE ENCOUNTER
Replied to patient on [employer] to see if she can upload at home. Unable to locate login information in recent [employer] messages or LOV note. Requested login information as well. Will await to hear back from patient.

## 2018-12-10 NOTE — TELEPHONE ENCOUNTER
Please see patient message. I have replied to patient to see if she can send a few days of BG readings.

## 2018-12-13 NOTE — TELEPHONE ENCOUNTER
Pt states BG still high and asking to make adjustments to her insulin.     12/13  after breakfast (ate 74 carbs)    Pt has not provided pump download

## 2018-12-21 ENCOUNTER — PATIENT MESSAGE (OUTPATIENT)
Dept: ENDOCRINOLOGY CLINIC | Facility: CLINIC | Age: 33
End: 2018-12-21

## 2018-12-21 NOTE — TELEPHONE ENCOUNTER
From: Ata Crane  To: Nell Massey MD  Sent: 12/21/2018 8:41 AM CST  Subject: Test Results Question    Is there a way to calibrate the meter?  This morning I had a sugar of 38 which I feel like can't possibly be right because when I get in the 70s

## 2018-12-21 NOTE — TELEPHONE ENCOUNTER
Replied to pt to calibrate pump twice daily and to check actual BGs when sensor reading is below 70 or above 200 (pt is pregnant) or her symptoms do not match the reading. Pt is on U-500 insulin via pump.     RN requesting pt come in for apt w/ provider and

## 2018-12-25 ENCOUNTER — PATIENT MESSAGE (OUTPATIENT)
Dept: ENDOCRINOLOGY CLINIC | Facility: CLINIC | Age: 33
End: 2018-12-25

## 2018-12-26 NOTE — TELEPHONE ENCOUNTER
Noted. Pt corrected after she ate. Pt has apt w/ provider on 12/28/18. Forwarded to provider as [place].

## 2018-12-26 NOTE — TELEPHONE ENCOUNTER
From: Deja Menendez  To:  July Sanchez MD  Sent: 12/25/2018 9:18 PM CST  Subject: Test Results Question    Hi there, just a note to remind us that the 234 high I had tonight was because I'm 90% sure I forgot to give myself my dose eating when I had a

## 2018-12-28 ENCOUNTER — OFFICE VISIT (OUTPATIENT)
Dept: ENDOCRINOLOGY CLINIC | Facility: CLINIC | Age: 33
End: 2018-12-28
Payer: COMMERCIAL

## 2018-12-28 ENCOUNTER — APPOINTMENT (OUTPATIENT)
Dept: LAB | Facility: HOSPITAL | Age: 33
End: 2018-12-28
Attending: INTERNAL MEDICINE
Payer: COMMERCIAL

## 2018-12-28 VITALS
WEIGHT: 291.81 LBS | BODY MASS INDEX: 49 KG/M2 | DIASTOLIC BLOOD PRESSURE: 87 MMHG | SYSTOLIC BLOOD PRESSURE: 122 MMHG | HEART RATE: 105 BPM

## 2018-12-28 DIAGNOSIS — Z79.4 CONTROLLED TYPE 2 DIABETES MELLITUS WITHOUT COMPLICATION, WITH LONG-TERM CURRENT USE OF INSULIN (HCC): ICD-10-CM

## 2018-12-28 DIAGNOSIS — E11.9 CONTROLLED TYPE 2 DIABETES MELLITUS WITHOUT COMPLICATION, WITH LONG-TERM CURRENT USE OF INSULIN (HCC): Primary | ICD-10-CM

## 2018-12-28 DIAGNOSIS — Z79.4 CONTROLLED TYPE 2 DIABETES MELLITUS WITHOUT COMPLICATION, WITH LONG-TERM CURRENT USE OF INSULIN (HCC): Primary | ICD-10-CM

## 2018-12-28 DIAGNOSIS — E11.9 CONTROLLED TYPE 2 DIABETES MELLITUS WITHOUT COMPLICATION, WITH LONG-TERM CURRENT USE OF INSULIN (HCC): ICD-10-CM

## 2018-12-28 LAB
T4 FREE SERPL-MCNC: 0.86 NG/DL (ref 0.58–1.64)
TSH SERPL-ACNC: 1.94 UIU/ML (ref 0.45–5.33)

## 2018-12-28 PROCEDURE — 99214 OFFICE O/P EST MOD 30 MIN: CPT | Performed by: INTERNAL MEDICINE

## 2018-12-28 PROCEDURE — 82962 GLUCOSE BLOOD TEST: CPT | Performed by: INTERNAL MEDICINE

## 2018-12-28 PROCEDURE — 36416 COLLJ CAPILLARY BLOOD SPEC: CPT | Performed by: INTERNAL MEDICINE

## 2018-12-28 PROCEDURE — 99212 OFFICE O/P EST SF 10 MIN: CPT | Performed by: INTERNAL MEDICINE

## 2018-12-28 PROCEDURE — 84443 ASSAY THYROID STIM HORMONE: CPT

## 2018-12-28 PROCEDURE — 36415 COLL VENOUS BLD VENIPUNCTURE: CPT

## 2018-12-28 PROCEDURE — 83036 HEMOGLOBIN GLYCOSYLATED A1C: CPT | Performed by: INTERNAL MEDICINE

## 2018-12-28 PROCEDURE — 84439 ASSAY OF FREE THYROXINE: CPT

## 2018-12-28 NOTE — PROGRESS NOTES
Name: Natalya Weber  Date: 12/28/2018    Referring Physician: No ref. provider found    HISTORY OF PRESENT ILLNESS   Natalya Weber is a 28year old female who presents for diabetes mellitus.   She was diagnosed with DM2 one year after delivery w units daily, Disp: 40 mL, Rfl: 0  •  Glucose Blood (CONTOUR NEXT TEST) In Vitro Strip, Check sugars 6 times daily, Disp: 600 each, Rfl: 5  •  LUPILLO MICROLET LANCETS Does not apply Misc, Check sugars 6 times daily, Disp: 600 each, Rfl: 5  •  Prenatal Vit-DS Disp: 30 tablet, Rfl: 2  •  METFORMIN HCL  MG Oral Tablet 24 Hr, TAKE 1 TABLET (500 MG TOTAL) BY MOUTH 2 (TWO) TIMES DAILY WITH MEALS., Disp: 60 tablet, Rfl: 1  •  Exenatide ER (BYDUREON BCISE) 2 MG/0.85ML Subcutaneous Auto-injector, Inject 2 mg into back tenderness  Respiratory:  clear to auscultation bilaterally  Cardiovascular:  regular rate, rhythm, , no murmurs, S3 or S4  Gastrointestinal:  normal bowel sounds and no palpable masses in abdomen, organomegaly or tenderness   Musculoskeletal:  normal

## 2018-12-31 ENCOUNTER — TELEPHONE (OUTPATIENT)
Dept: ENDOCRINOLOGY CLINIC | Facility: CLINIC | Age: 33
End: 2018-12-31

## 2018-12-31 NOTE — TELEPHONE ENCOUNTER
Current Outpatient Medications:  insulin regular human, conc, (HUMULIN R U-500, CONCENTRATED,) 500 UNIT/ML Subcutaneous Solution Inject via insulin pump.  Maximum 150 units daily Disp: 40 mL Rfl: 0     Refill

## 2019-01-01 ENCOUNTER — EXTERNAL RECORD (OUTPATIENT)
Dept: HEALTH INFORMATION MANAGEMENT | Facility: OTHER | Age: 34
End: 2019-01-01

## 2019-01-03 ENCOUNTER — TELEPHONE (OUTPATIENT)
Dept: ENDOCRINOLOGY CLINIC | Facility: CLINIC | Age: 34
End: 2019-01-03

## 2019-01-03 RX ORDER — BLOOD SUGAR DIAGNOSTIC
STRIP MISCELLANEOUS
Qty: 200 STRIP | Refills: 3 | Status: SHIPPED | OUTPATIENT
Start: 2019-01-03

## 2019-01-07 ENCOUNTER — OFFICE VISIT (OUTPATIENT)
Dept: OBGYN | Age: 34
End: 2019-01-07

## 2019-01-07 ENCOUNTER — LAB SERVICES (OUTPATIENT)
Dept: LAB | Age: 34
End: 2019-01-07

## 2019-01-07 VITALS
BODY MASS INDEX: 48.82 KG/M2 | DIASTOLIC BLOOD PRESSURE: 90 MMHG | HEIGHT: 65 IN | SYSTOLIC BLOOD PRESSURE: 132 MMHG | HEART RATE: 84 BPM | WEIGHT: 293 LBS

## 2019-01-07 DIAGNOSIS — Z36.1 ANTENATAL SCREENING FOR RAISED ALPHAFETOPROTEIN LEVEL: ICD-10-CM

## 2019-01-07 DIAGNOSIS — Z34.82 ENCOUNTER FOR SUPERVISION OF NORMAL PREGNANCY IN MULTIGRAVIDA IN SECOND TRIMESTER: ICD-10-CM

## 2019-01-07 DIAGNOSIS — E03.9 HYPOTHYROIDISM, UNSPECIFIED TYPE: ICD-10-CM

## 2019-01-07 DIAGNOSIS — Z13.0 SCREENING FOR IRON DEFICIENCY ANEMIA: ICD-10-CM

## 2019-01-07 DIAGNOSIS — Z34.82 ENCOUNTER FOR SUPERVISION OF NORMAL PREGNANCY IN MULTIGRAVIDA IN SECOND TRIMESTER: Primary | ICD-10-CM

## 2019-01-07 DIAGNOSIS — Z11.3 SCREENING FOR STDS (SEXUALLY TRANSMITTED DISEASES): ICD-10-CM

## 2019-01-07 PROCEDURE — 99213 OFFICE O/P EST LOW 20 MIN: CPT | Performed by: LEGAL MEDICINE

## 2019-01-07 PROCEDURE — 86900 BLOOD TYPING SEROLOGIC ABO: CPT | Performed by: LEGAL MEDICINE

## 2019-01-07 PROCEDURE — 86901 BLOOD TYPING SEROLOGIC RH(D): CPT | Performed by: LEGAL MEDICINE

## 2019-01-07 PROCEDURE — 86762 RUBELLA ANTIBODY: CPT | Performed by: LEGAL MEDICINE

## 2019-01-07 PROCEDURE — 84439 ASSAY OF FREE THYROXINE: CPT | Performed by: LEGAL MEDICINE

## 2019-01-07 PROCEDURE — 84443 ASSAY THYROID STIM HORMONE: CPT | Performed by: LEGAL MEDICINE

## 2019-01-07 PROCEDURE — 81025 URINE PREGNANCY TEST: CPT | Performed by: LEGAL MEDICINE

## 2019-01-07 PROCEDURE — 86592 SYPHILIS TEST NON-TREP QUAL: CPT | Performed by: LEGAL MEDICINE

## 2019-01-07 PROCEDURE — 84481 FREE ASSAY (FT-3): CPT | Performed by: LEGAL MEDICINE

## 2019-01-07 PROCEDURE — 86850 RBC ANTIBODY SCREEN: CPT | Performed by: LEGAL MEDICINE

## 2019-01-07 PROCEDURE — 87389 HIV-1 AG W/HIV-1&-2 AB AG IA: CPT | Performed by: LEGAL MEDICINE

## 2019-01-07 PROCEDURE — 87340 HEPATITIS B SURFACE AG IA: CPT | Performed by: LEGAL MEDICINE

## 2019-01-07 PROCEDURE — 82105 ALPHA-FETOPROTEIN SERUM: CPT | Performed by: FAMILY MEDICINE

## 2019-01-07 RX ORDER — SUBCUTANEOUS INSULIN PUMP
1 EACH MISCELLANEOUS
COMMUNITY
Start: 2018-10-29

## 2019-01-07 RX ORDER — PNV NO.95/FERROUS FUM/FOLIC AC 28MG-0.8MG
TABLET ORAL
COMMUNITY

## 2019-01-07 RX ORDER — LEVOTHYROXINE SODIUM 175 UG/1
175 TABLET ORAL
COMMUNITY
Start: 2018-07-17 | End: 2021-08-04 | Stop reason: ALTCHOICE

## 2019-01-07 RX ORDER — METFORMIN HYDROCHLORIDE 500 MG/1
500 TABLET, EXTENDED RELEASE ORAL
COMMUNITY
Start: 2018-08-09 | End: 2021-08-04 | Stop reason: ALTCHOICE

## 2019-01-07 SDOH — HEALTH STABILITY: MENTAL HEALTH: HOW OFTEN DO YOU HAVE A DRINK CONTAINING ALCOHOL?: NEVER

## 2019-01-08 LAB
ABO + RH BLD: NORMAL
BASOPHILS # BLD AUTO: 0 K/MCL (ref 0–0.3)
BASOPHILS NFR BLD AUTO: 0 %
BLD GP AB SCN SERPL QL: NEGATIVE
DIFFERENTIAL METHOD BLD: ABNORMAL
EOSINOPHIL # BLD AUTO: 0.2 K/MCL (ref 0.1–0.5)
EOSINOPHIL NFR SPEC: 2 %
ERYTHROCYTE [DISTWIDTH] IN BLOOD: 13.9 % (ref 11–15)
HBV SURFACE AG SER QL: NEGATIVE
HCT VFR BLD CALC: 38.7 % (ref 36–46.5)
HGB BLD-MCNC: 12.2 G/DL (ref 12–15.5)
HIV 1+2 AB+HIV1 P24 AG SERPL QL IA: NONREACTIVE
IMM GRANULOCYTES # BLD AUTO: 0.2 K/MCL (ref 0–0.2)
IMM GRANULOCYTES NFR BLD: 2 %
LYMPHOCYTES # BLD MANUAL: 2.3 K/MCL (ref 1–4.8)
LYMPHOCYTES NFR BLD MANUAL: 21 %
MCH RBC QN AUTO: 27.4 PG (ref 26–34)
MCHC RBC AUTO-ENTMCNC: 31.5 G/DL (ref 32–36.5)
MCV RBC AUTO: 86.8 FL (ref 78–100)
MONOCYTES # BLD MANUAL: 0.6 K/MCL (ref 0.3–0.9)
MONOCYTES NFR BLD MANUAL: 6 %
NEUTROPHILS # BLD: 7.7 K/MCL (ref 1.8–7.7)
NEUTROPHILS NFR BLD AUTO: 69 %
NRBC BLD MANUAL-RTO: 0 /100 WBC
PLATELET # BLD: 190 K/MCL (ref 140–450)
RBC # BLD: 4.46 MIL/MCL (ref 4–5.2)
RPR SER QL: NONREACTIVE
RUBV IGG SERPL IA-ACNC: 472.6 UNITS/ML
T3FREE SERPL-MCNC: 2.8 PG/ML (ref 2.2–4)
T4 FREE SERPL-MCNC: 1.1 NG/DL (ref 0.8–1.5)
TSH SERPL-ACNC: 3.02 MCUNITS/ML (ref 0.35–5)
WBC # BLD: 11 K/MCL (ref 4.2–11)

## 2019-01-10 LAB
# FETUSES US: NORMAL
AFP MOM SERPL: 0.61 MOM
AFP SERPL-MCNC: 7.7 NG/ML
AGE AT DELIVERY: 35.32 YEARS
GA: NORMAL WK
IDDM PATIENT QL: YES
NEURAL TUBE DEFECT RISK FETUS: NORMAL %
SCREENING STATUS: NORMAL
SERVICE CMNT-IMP: NORMAL

## 2019-01-11 ENCOUNTER — FIRST OB VISIT (OUTPATIENT)
Dept: OBGYN | Age: 34
End: 2019-01-11

## 2019-01-11 VITALS
HEIGHT: 65 IN | SYSTOLIC BLOOD PRESSURE: 122 MMHG | DIASTOLIC BLOOD PRESSURE: 76 MMHG | BODY MASS INDEX: 48.82 KG/M2 | HEART RATE: 82 BPM | WEIGHT: 293 LBS

## 2019-01-11 DIAGNOSIS — Z34.82 ENCOUNTER FOR SUPERVISION OF NORMAL PREGNANCY IN MULTIGRAVIDA IN SECOND TRIMESTER: Primary | ICD-10-CM

## 2019-01-11 PROCEDURE — 0500F INITIAL PRENATAL CARE VISIT: CPT | Performed by: LEGAL MEDICINE

## 2019-01-15 RX ORDER — METFORMIN HYDROCHLORIDE 500 MG/1
TABLET, EXTENDED RELEASE ORAL
Qty: 60 TABLET | Refills: 0 | OUTPATIENT
Start: 2019-01-15

## 2019-01-15 NOTE — TELEPHONE ENCOUNTER
LOV 12/28/2018. Routed to [facility] to see if patient will continue on Metformin at this time.

## 2019-01-16 RX ORDER — METFORMIN HYDROCHLORIDE 500 MG/1
TABLET, EXTENDED RELEASE ORAL
Qty: 60 TABLET | Refills: 3 | Status: SHIPPED | OUTPATIENT
Start: 2019-01-16 | End: 2021-01-19

## 2019-01-17 RX ORDER — METFORMIN HYDROCHLORIDE 500 MG/1
TABLET, EXTENDED RELEASE ORAL
Qty: 60 TABLET | Refills: 5 | Status: SHIPPED | OUTPATIENT
Start: 2019-01-17 | End: 2020-02-06

## 2019-01-22 ENCOUNTER — TELEPHONE (OUTPATIENT)
Dept: OBGYN | Age: 34
End: 2019-01-22

## 2019-01-22 ENCOUNTER — DOCUMENTATION (OUTPATIENT)
Dept: OBGYN | Age: 34
End: 2019-01-22

## 2019-01-22 ENCOUNTER — TELEPHONE (OUTPATIENT)
Dept: ENDOCRINOLOGY CLINIC | Facility: CLINIC | Age: 34
End: 2019-01-22

## 2019-01-22 NOTE — TELEPHONE ENCOUNTER
Willis/Ophelia calling to confirm fax for pts Pump Supplies, faxed today. Pls call XT:[phone] Ex: [phone], ok to leave  indicating fax recvd, thanks.

## 2019-01-22 NOTE — TELEPHONE ENCOUNTER
Left detailed message with [facility] requesting fax be sent again as I have not seen it come through.

## 2019-02-01 ENCOUNTER — PATIENT MESSAGE (OUTPATIENT)
Dept: ENDOCRINOLOGY CLINIC | Facility: CLINIC | Age: 34
End: 2019-02-01

## 2019-02-01 ENCOUNTER — OFFICE VISIT (OUTPATIENT)
Dept: ENDOCRINOLOGY CLINIC | Facility: CLINIC | Age: 34
End: 2019-02-01
Payer: COMMERCIAL

## 2019-02-01 VITALS
HEART RATE: 113 BPM | DIASTOLIC BLOOD PRESSURE: 78 MMHG | SYSTOLIC BLOOD PRESSURE: 121 MMHG | WEIGHT: 293 LBS | BODY MASS INDEX: 51 KG/M2

## 2019-02-01 DIAGNOSIS — O24.112 PRE-EXISTING TYPE 2 DIABETES MELLITUS DURING PREGNANCY IN SECOND TRIMESTER: Primary | ICD-10-CM

## 2019-02-01 LAB
CARTRIDGE LOT#: ABNORMAL NUMERIC
GLUCOSE BLOOD: 142
HEMOGLOBIN A1C: 5.9 % (ref 4.3–5.6)
TEST STRIP LOT #: NORMAL NUMERIC

## 2019-02-01 PROCEDURE — 82962 GLUCOSE BLOOD TEST: CPT | Performed by: INTERNAL MEDICINE

## 2019-02-01 PROCEDURE — 99214 OFFICE O/P EST MOD 30 MIN: CPT | Performed by: INTERNAL MEDICINE

## 2019-02-01 PROCEDURE — 83036 HEMOGLOBIN GLYCOSYLATED A1C: CPT | Performed by: INTERNAL MEDICINE

## 2019-02-01 PROCEDURE — 36416 COLLJ CAPILLARY BLOOD SPEC: CPT | Performed by: INTERNAL MEDICINE

## 2019-02-01 PROCEDURE — 99212 OFFICE O/P EST SF 10 MIN: CPT | Performed by: INTERNAL MEDICINE

## 2019-02-01 NOTE — TELEPHONE ENCOUNTER
From: Claudio Lopez  To:  Juan Celestin MD  Sent: 2/1/2019 10:52 AM CST  Subject: Other    I've been circling looking for parking for 15 minutes just want you to know I'm here but literally there is no parking

## 2019-02-01 NOTE — PROGRESS NOTES
Name: Han Rutledge  Date: 2/1/2019    Referring Physician: No ref. provider found    HISTORY OF PRESENT ILLNESS   Han Rutledge is a 28year old female who presents for diabetes mellitus.   She was diagnosed with DM2 one year after delivery wit tablet, Rfl: 3  •  Glucose Blood (ACCU-CHEK SMARTVIEW) In Vitro Strip, Use to test blood sugar up to 7 times per day. Ok to substitute to preferred brand. , Disp: 200 strip, Rfl: 3  •  insulin regular human, conc, (HUMULIN R U-500, CONCENTRATED,) 500 UNIT/M Rfl:   •  Albuterol Sulfate HFA (PROAIR HFA) 108 (90 BASE) MCG/ACT Inhalation Aero Soln, 2 puffs 3-4 times daily as needed. , Disp: 1 Inhaler, Rfl: 0  •  METFORMIN HCL  MG Oral Tablet 24 Hr, TAKE 1 TABLET BY MOUTH TWICE A DAY WITH MEALS, Disp: 60 tabl sclera. , normal sclera and normal pupils  Ears/Nose/Mouth/Throat/Neck:  no palpable thyroid nodules or cervical lymphadenopathy  Back: no kyphosis or back tenderness  Respiratory:  clear to auscultation bilaterally  Cardiovascular:  regular rate, rhythm, ,

## 2019-02-08 ENCOUNTER — OB CHECK (OUTPATIENT)
Dept: OBGYN | Age: 34
End: 2019-02-08

## 2019-02-08 VITALS
BODY MASS INDEX: 48.82 KG/M2 | SYSTOLIC BLOOD PRESSURE: 120 MMHG | WEIGHT: 293 LBS | DIASTOLIC BLOOD PRESSURE: 82 MMHG | HEIGHT: 65 IN

## 2019-02-08 DIAGNOSIS — E03.9 HYPOTHYROID IN PREGNANCY, ANTEPARTUM: ICD-10-CM

## 2019-02-08 DIAGNOSIS — Z98.891 HISTORY OF CESAREAN SECTION, CLASSICAL: ICD-10-CM

## 2019-02-08 DIAGNOSIS — O99.280 HYPOTHYROID IN PREGNANCY, ANTEPARTUM: ICD-10-CM

## 2019-02-08 DIAGNOSIS — Z34.82 ENCOUNTER FOR SUPERVISION OF NORMAL PREGNANCY IN MULTIGRAVIDA IN SECOND TRIMESTER: Primary | ICD-10-CM

## 2019-02-08 DIAGNOSIS — O09.299 HX OF PREECLAMPSIA, PRIOR PREGNANCY, CURRENTLY PREGNANT: ICD-10-CM

## 2019-02-08 PROBLEM — O09.522 ELDERLY MULTIGRAVIDA IN SECOND TRIMESTER: Status: ACTIVE | Noted: 2019-02-08

## 2019-02-08 PROBLEM — E66.01 MORBIDLY OBESE (CMD): Status: ACTIVE | Noted: 2019-02-08

## 2019-02-08 PROBLEM — O24.112 PRE-EXISTING TYPE 2 DIABETES MELLITUS DURING PREGNANCY IN SECOND TRIMESTER: Status: ACTIVE | Noted: 2019-02-08

## 2019-02-08 PROCEDURE — 90471 IMMUNIZATION ADMIN: CPT | Performed by: OBSTETRICS & GYNECOLOGY

## 2019-02-08 PROCEDURE — 90686 IIV4 VACC NO PRSV 0.5 ML IM: CPT | Performed by: OBSTETRICS & GYNECOLOGY

## 2019-02-08 PROCEDURE — 0502F SUBSEQUENT PRENATAL CARE: CPT | Performed by: OBSTETRICS & GYNECOLOGY

## 2019-02-08 RX ORDER — LEVOTHYROXINE SODIUM 175 UG/1
175 TABLET ORAL
Qty: 30 TABLET | Refills: 2 | Status: SHIPPED | OUTPATIENT
Start: 2019-02-08 | End: 2019-05-19

## 2019-02-18 ENCOUNTER — PATIENT MESSAGE (OUTPATIENT)
Dept: ENDOCRINOLOGY CLINIC | Facility: CLINIC | Age: 34
End: 2019-02-18

## 2019-02-18 NOTE — TELEPHONE ENCOUNTER
From: Gisell Adams  To: Vinicius Sanders MD  Sent: 2/18/2019 5:17 PM CST  Subject: Prescription Question    Hi there! Can you send another 90 day prescription for the humulin R?  I think they said they only had a 26 day prescription so they couldn't emy

## 2019-02-18 NOTE — TELEPHONE ENCOUNTER
Dr Disha Flores - U500 insulin med is already pended for you but pt is asking you to resend since it cannot be filled (needs to be 90 day-was only 26 days)    thanks

## 2019-02-26 ENCOUNTER — HOSPITAL (OUTPATIENT)
Dept: OTHER | Age: 34
End: 2019-02-26
Attending: OBSTETRICS & GYNECOLOGY

## 2019-02-26 ENCOUNTER — HOSPITAL (OUTPATIENT)
Dept: OTHER | Age: 34
End: 2019-02-26
Attending: PEDIATRICS

## 2019-02-27 ENCOUNTER — TELEPHONE (OUTPATIENT)
Dept: ENDOCRINOLOGY CLINIC | Facility: CLINIC | Age: 34
End: 2019-02-27

## 2019-02-28 NOTE — TELEPHONE ENCOUNTER
Medication PA Requested:  Contour next test strips                                                        Pt insurance/number to contact: [phone]  CoverMyMeds Used: no    Key:   Insurance ID# and group: [facility] [employer]  Dx.: [id number]  Medications tried prev

## 2019-03-01 ENCOUNTER — APPOINTMENT (OUTPATIENT)
Dept: LAB | Facility: HOSPITAL | Age: 34
End: 2019-03-01
Attending: INTERNAL MEDICINE
Payer: COMMERCIAL

## 2019-03-01 ENCOUNTER — OFFICE VISIT (OUTPATIENT)
Dept: ENDOCRINOLOGY CLINIC | Facility: CLINIC | Age: 34
End: 2019-03-01
Payer: COMMERCIAL

## 2019-03-01 VITALS
HEIGHT: 65 IN | BODY MASS INDEX: 48.82 KG/M2 | SYSTOLIC BLOOD PRESSURE: 135 MMHG | WEIGHT: 293 LBS | DIASTOLIC BLOOD PRESSURE: 83 MMHG | HEART RATE: 112 BPM

## 2019-03-01 DIAGNOSIS — O24.419 GESTATIONAL DIABETES MELLITUS (GDM), ANTEPARTUM, GESTATIONAL DIABETES METHOD OF CONTROL UNSPECIFIED: Primary | ICD-10-CM

## 2019-03-01 DIAGNOSIS — E06.3 HYPOTHYROIDISM DUE TO HASHIMOTO'S THYROIDITIS: ICD-10-CM

## 2019-03-01 DIAGNOSIS — E03.8 HYPOTHYROIDISM DUE TO HASHIMOTO'S THYROIDITIS: ICD-10-CM

## 2019-03-01 DIAGNOSIS — O24.419 GESTATIONAL DIABETES MELLITUS (GDM), ANTEPARTUM, GESTATIONAL DIABETES METHOD OF CONTROL UNSPECIFIED: ICD-10-CM

## 2019-03-01 LAB
CARTRIDGE LOT#: ABNORMAL NUMERIC
GLUCOSE BLOOD: 112
HEMOGLOBIN A1C: 5.7 % (ref 4.3–5.6)
T4 FREE SERPL-MCNC: 1 NG/DL (ref 0.8–1.7)
TEST STRIP LOT #: NORMAL NUMERIC
TSI SER-ACNC: 2.45 MIU/ML (ref 0.36–3.74)

## 2019-03-01 PROCEDURE — 84443 ASSAY THYROID STIM HORMONE: CPT

## 2019-03-01 PROCEDURE — 99212 OFFICE O/P EST SF 10 MIN: CPT | Performed by: INTERNAL MEDICINE

## 2019-03-01 PROCEDURE — 36415 COLL VENOUS BLD VENIPUNCTURE: CPT

## 2019-03-01 PROCEDURE — 83036 HEMOGLOBIN GLYCOSYLATED A1C: CPT | Performed by: INTERNAL MEDICINE

## 2019-03-01 PROCEDURE — 99214 OFFICE O/P EST MOD 30 MIN: CPT | Performed by: INTERNAL MEDICINE

## 2019-03-01 PROCEDURE — 84439 ASSAY OF FREE THYROXINE: CPT

## 2019-03-01 PROCEDURE — 82962 GLUCOSE BLOOD TEST: CPT | Performed by: INTERNAL MEDICINE

## 2019-03-01 PROCEDURE — 36416 COLLJ CAPILLARY BLOOD SPEC: CPT | Performed by: INTERNAL MEDICINE

## 2019-03-01 NOTE — PROGRESS NOTES
Name: Ata Crane  Date: 3/1/2019    Referring Physician: No ref. provider found    HISTORY OF PRESENT ILLNESS   Ata Crane is a 28year old female who presents for diabetes mellitus.   She was diagnosed with DM2 one year after delivery wit Subcutaneous Solution, INJECT VIA INSULIN PUMP.  MAXIMUM 150 UNITS DAILY, Disp: 40 mL, Rfl: 1  •  LEVOTHYROXINE SODIUM 175 MCG Oral Tab, TAKE 1 TABLET (175 MCG TOTAL) BY MOUTH BEFORE BREAKFAST., Disp: 30 tablet, Rfl: 2  •  METFORMIN HCL  MG Oral Table LEVOTHYROXINE SODIUM 175 MCG Oral Tab, TAKE 1 TABLET (175 MCG TOTAL) BY MOUTH BEFORE BREAKFAST., Disp: 30 tablet, Rfl: 0  •  Insulin Pen Needle (BD PEN NEEDLE MARIA G U/F) 32G X 4 MM Does not apply Misc, Inject once daily, Disp: 100 each, Rfl: 1  •  ONETOUCH no acute distress  Eyes:  normal conjunctivae, sclera. , normal sclera and normal pupils  Ears/Nose/Mouth/Throat/Neck:  no palpable thyroid nodules or cervical lymphadenopathy  Back: no kyphosis or back tenderness  Respiratory:  clear to auscultation bilate

## 2019-03-04 ENCOUNTER — OB CHECK (OUTPATIENT)
Dept: OBGYN | Age: 34
End: 2019-03-04

## 2019-03-04 VITALS
DIASTOLIC BLOOD PRESSURE: 82 MMHG | SYSTOLIC BLOOD PRESSURE: 140 MMHG | WEIGHT: 293 LBS | HEART RATE: 84 BPM | BODY MASS INDEX: 48.82 KG/M2 | HEIGHT: 65 IN

## 2019-03-04 DIAGNOSIS — E03.9 HYPOTHYROID IN PREGNANCY, ANTEPARTUM: ICD-10-CM

## 2019-03-04 DIAGNOSIS — O24.112 PRE-EXISTING TYPE 2 DIABETES MELLITUS DURING PREGNANCY IN SECOND TRIMESTER: ICD-10-CM

## 2019-03-04 DIAGNOSIS — O09.522 ELDERLY MULTIGRAVIDA IN SECOND TRIMESTER: Primary | ICD-10-CM

## 2019-03-04 DIAGNOSIS — O09.299 HX OF PREECLAMPSIA, PRIOR PREGNANCY, CURRENTLY PREGNANT: ICD-10-CM

## 2019-03-04 DIAGNOSIS — O99.280 HYPOTHYROID IN PREGNANCY, ANTEPARTUM: ICD-10-CM

## 2019-03-04 DIAGNOSIS — Z98.891 HISTORY OF CESAREAN SECTION, CLASSICAL: ICD-10-CM

## 2019-03-04 DIAGNOSIS — E66.01 MORBIDLY OBESE (CMD): ICD-10-CM

## 2019-03-04 PROCEDURE — 0502F SUBSEQUENT PRENATAL CARE: CPT | Performed by: LEGAL MEDICINE

## 2019-03-11 ENCOUNTER — LAB SERVICES (OUTPATIENT)
Dept: OBGYN | Age: 34
End: 2019-03-11

## 2019-03-11 DIAGNOSIS — E03.9 HYPOTHYROID IN PREGNANCY, ANTEPARTUM: ICD-10-CM

## 2019-03-11 DIAGNOSIS — O99.280 HYPOTHYROID IN PREGNANCY, ANTEPARTUM: ICD-10-CM

## 2019-03-11 DIAGNOSIS — Z34.82 ENCOUNTER FOR SUPERVISION OF NORMAL PREGNANCY IN MULTIGRAVIDA IN SECOND TRIMESTER: Primary | ICD-10-CM

## 2019-03-11 PROCEDURE — 36415 COLL VENOUS BLD VENIPUNCTURE: CPT

## 2019-03-11 PROCEDURE — 80053 COMPREHEN METABOLIC PANEL: CPT | Performed by: LEGAL MEDICINE

## 2019-03-11 PROCEDURE — 82570 ASSAY OF URINE CREATININE: CPT | Performed by: LEGAL MEDICINE

## 2019-03-11 PROCEDURE — 85025 COMPLETE CBC W/AUTO DIFF WBC: CPT | Performed by: LEGAL MEDICINE

## 2019-03-11 PROCEDURE — 99000 SPECIMEN HANDLING OFFICE-LAB: CPT

## 2019-03-11 PROCEDURE — 81050 URINALYSIS VOLUME MEASURE: CPT | Performed by: LEGAL MEDICINE

## 2019-03-11 PROCEDURE — 84156 ASSAY OF PROTEIN URINE: CPT | Performed by: LEGAL MEDICINE

## 2019-03-12 LAB
ALBUMIN SERPL-MCNC: 2.8 G/DL (ref 3.6–5.1)
ALBUMIN/GLOB SERPL: 0.8 {RATIO} (ref 1–2.4)
ALP SERPL-CCNC: 61 UNITS/L (ref 45–117)
ALT SERPL-CCNC: 19 UNITS/L
ANION GAP SERPL CALC-SCNC: 14 MMOL/L (ref 10–20)
AST SERPL-CCNC: 17 UNITS/L
BASOPHILS # BLD AUTO: 0 K/MCL (ref 0–0.3)
BASOPHILS NFR BLD AUTO: 0 %
BILIRUB SERPL-MCNC: 0.2 MG/DL (ref 0.2–1)
BUN SERPL-MCNC: 8 MG/DL (ref 6–20)
BUN/CREAT SERPL: 13 (ref 7–25)
CALCIUM SERPL-MCNC: 8.6 MG/DL (ref 8.4–10.2)
CHLORIDE SERPL-SCNC: 107 MMOL/L (ref 98–107)
CO2 SERPL-SCNC: 23 MMOL/L (ref 21–32)
CREAT SERPL-MCNC: 0.61 MG/DL (ref 0.51–0.95)
DIFFERENTIAL METHOD BLD: ABNORMAL
EOSINOPHIL # BLD AUTO: 0.3 K/MCL (ref 0.1–0.5)
EOSINOPHIL NFR SPEC: 2 %
ERYTHROCYTE [DISTWIDTH] IN BLOOD: 14.9 % (ref 11–15)
FASTING STATUS PATIENT QL REPORTED: ABNORMAL HRS
GLOBULIN SER-MCNC: 3.7 G/DL (ref 2–4)
GLUCOSE SERPL-MCNC: 87 MG/DL (ref 65–99)
HCT VFR BLD CALC: 36.1 % (ref 36–46.5)
HGB BLD-MCNC: 11.3 G/DL (ref 12–15.5)
IMM GRANULOCYTES # BLD AUTO: 0.2 K/MCL (ref 0–0.2)
IMM GRANULOCYTES NFR BLD: 2 %
LYMPHOCYTES # BLD MANUAL: 2.1 K/MCL (ref 1–4.8)
LYMPHOCYTES NFR BLD MANUAL: 20 %
MCH RBC QN AUTO: 26.8 PG (ref 26–34)
MCHC RBC AUTO-ENTMCNC: 31.3 G/DL (ref 32–36.5)
MCV RBC AUTO: 85.5 FL (ref 78–100)
MONOCYTES # BLD MANUAL: 0.7 K/MCL (ref 0.3–0.9)
MONOCYTES NFR BLD MANUAL: 6 %
NEUTROPHILS # BLD: 7.5 K/MCL (ref 1.8–7.7)
NEUTROPHILS NFR BLD AUTO: 70 %
NRBC BLD MANUAL-RTO: 0 /100 WBC
PLATELET # BLD: 168 K/MCL (ref 140–450)
POTASSIUM SERPL-SCNC: 4.2 MMOL/L (ref 3.4–5.1)
PROT SERPL-MCNC: 6.5 G/DL (ref 6.4–8.2)
RBC # BLD: 4.22 MIL/MCL (ref 4–5.2)
SODIUM SERPL-SCNC: 140 MMOL/L (ref 135–145)
WBC # BLD: 10.7 K/MCL (ref 4.2–11)

## 2019-03-13 ENCOUNTER — APPOINTMENT (OUTPATIENT)
Dept: OBGYN | Age: 34
End: 2019-03-13

## 2019-03-13 ENCOUNTER — E-ADVICE (OUTPATIENT)
Dept: OBGYN | Age: 34
End: 2019-03-13

## 2019-03-13 ENCOUNTER — TELEPHONE (OUTPATIENT)
Dept: OBGYN | Age: 34
End: 2019-03-13

## 2019-03-13 DIAGNOSIS — O99.280 HYPOTHYROID IN PREGNANCY, ANTEPARTUM: ICD-10-CM

## 2019-03-13 DIAGNOSIS — E03.9 HYPOTHYROID IN PREGNANCY, ANTEPARTUM: ICD-10-CM

## 2019-03-13 DIAGNOSIS — O24.112 PRE-EXISTING TYPE 2 DIABETES MELLITUS DURING PREGNANCY IN SECOND TRIMESTER: ICD-10-CM

## 2019-03-13 DIAGNOSIS — E66.01 MORBIDLY OBESE (CMD): ICD-10-CM

## 2019-03-13 DIAGNOSIS — O09.299 HX OF PREECLAMPSIA, PRIOR PREGNANCY, CURRENTLY PREGNANT: ICD-10-CM

## 2019-03-13 DIAGNOSIS — O09.522 ELDERLY MULTIGRAVIDA IN SECOND TRIMESTER: ICD-10-CM

## 2019-03-13 DIAGNOSIS — Z98.891 HISTORY OF CESAREAN SECTION, CLASSICAL: ICD-10-CM

## 2019-03-13 LAB
COLLECT DURATION TIME UR: 24 HRS
CREAT 24H UR-MRATE: 1.98 G/24 HR (ref 0.67–1.59)
CREAT ?TM UR-MCNC: 76.3 MG/DL
PROT 24H UR-MRATE: 182 MG/24 HR (ref 0–148)
PROT ?TM UR-MCNC: 7 MG/DL
SERVICE CMNT-IMP: NORMAL
SPECIMEN VOL UR: 2600 ML

## 2019-03-13 NOTE — TELEPHONE ENCOUNTER
Current Outpatient Medications:  insulin regular human, conc, (HUMULIN R U-500, CONCENTRATED,) 500 UNIT/ML Subcutaneous Solution INJECT VIA INSULIN PUMP. MAXIMUM 150 UNITS DAILY Disp: 40 mL Rfl: 1     Fax received from [employer].   Requesting a 90 day

## 2019-03-14 LAB — ADDENDUM DOC: NORMAL

## 2019-03-30 ENCOUNTER — PATIENT MESSAGE (OUTPATIENT)
Dept: ENDOCRINOLOGY CLINIC | Facility: CLINIC | Age: 34
End: 2019-03-30

## 2019-04-01 NOTE — TELEPHONE ENCOUNTER
RN rec'd the following email from rep Varun WHITING At [address]    \"We are working with the insurance to fix the situation. We resubmitted to PFS and following up with BC/BS. Let patient know we are following up and feel free to give her my information. \"

## 2019-04-01 NOTE — TELEPHONE ENCOUNTER
From: Claudio Lopez  To: Juan Celestin MD  Sent: 3/30/2019 11:45 AM CDT  Subject: Prescription Question    Hi Dr. Rizwan Farah,    I'm freaking out!  I just received a letter from [facility] denying my insulin pump deeming it not medically necess

## 2019-04-05 ENCOUNTER — OFFICE VISIT (OUTPATIENT)
Dept: ENDOCRINOLOGY CLINIC | Facility: CLINIC | Age: 34
End: 2019-04-05
Payer: COMMERCIAL

## 2019-04-05 ENCOUNTER — TELEPHONE (OUTPATIENT)
Dept: OBGYN | Age: 34
End: 2019-04-05

## 2019-04-05 ENCOUNTER — TELEPHONE (OUTPATIENT)
Dept: ENDOCRINOLOGY CLINIC | Facility: CLINIC | Age: 34
End: 2019-04-05

## 2019-04-05 ENCOUNTER — APPOINTMENT (OUTPATIENT)
Dept: OBGYN | Age: 34
End: 2019-04-05

## 2019-04-05 ENCOUNTER — OB CHECK (OUTPATIENT)
Dept: OBGYN | Age: 34
End: 2019-04-05

## 2019-04-05 ENCOUNTER — HOSPITAL ENCOUNTER (OUTPATIENT)
Dept: ULTRASOUND IMAGING | Facility: HOSPITAL | Age: 34
Discharge: HOME | End: 2019-04-05
Attending: LEGAL MEDICINE
Payer: COMMERCIAL

## 2019-04-05 VITALS
HEART RATE: 115 BPM | SYSTOLIC BLOOD PRESSURE: 137 MMHG | BODY MASS INDEX: 53 KG/M2 | WEIGHT: 293 LBS | DIASTOLIC BLOOD PRESSURE: 93 MMHG

## 2019-04-05 VITALS
HEIGHT: 65 IN | DIASTOLIC BLOOD PRESSURE: 88 MMHG | WEIGHT: 293 LBS | HEART RATE: 86 BPM | SYSTOLIC BLOOD PRESSURE: 140 MMHG | BODY MASS INDEX: 48.82 KG/M2

## 2019-04-05 DIAGNOSIS — O24.112 PRE-EXISTING TYPE 2 DIABETES MELLITUS IN PREGNANCY IN SECOND TRIMESTER: ICD-10-CM

## 2019-04-05 DIAGNOSIS — E66.01 MORBID OBESITY (HCC): ICD-10-CM

## 2019-04-05 DIAGNOSIS — O99.280 HYPOTHYROID IN PREGNANCY, ANTEPARTUM: ICD-10-CM

## 2019-04-05 DIAGNOSIS — E06.3 HYPOTHYROIDISM DUE TO HASHIMOTO'S THYROIDITIS: ICD-10-CM

## 2019-04-05 DIAGNOSIS — E03.9 HYPOTHYROID IN PREGNANCY, ANTEPARTUM: ICD-10-CM

## 2019-04-05 DIAGNOSIS — E03.8 HYPOTHYROIDISM DUE TO HASHIMOTO'S THYROIDITIS: ICD-10-CM

## 2019-04-05 DIAGNOSIS — O24.112 PRE-EXISTING TYPE 2 DIABETES MELLITUS DURING PREGNANCY IN SECOND TRIMESTER: ICD-10-CM

## 2019-04-05 DIAGNOSIS — O09.299 HX OF PREECLAMPSIA, PRIOR PREGNANCY, CURRENTLY PREGNANT: ICD-10-CM

## 2019-04-05 DIAGNOSIS — O24.113 PRE-EXISTING TYPE 2 DIABETES MELLITUS IN PREGNANCY IN THIRD TRIMESTER: Primary | ICD-10-CM

## 2019-04-05 DIAGNOSIS — O24.112 PRE-EXISTING TYPE 2 DIABETES MELLITUS DURING PREGNANCY IN SECOND TRIMESTER: Primary | ICD-10-CM

## 2019-04-05 DIAGNOSIS — O09.522 ELDERLY MULTIGRAVIDA IN SECOND TRIMESTER: ICD-10-CM

## 2019-04-05 DIAGNOSIS — E66.01 MORBIDLY OBESE (CMD): ICD-10-CM

## 2019-04-05 DIAGNOSIS — Z34.82 ENCOUNTER FOR SUPERVISION OF NORMAL PREGNANCY IN MULTIGRAVIDA IN SECOND TRIMESTER: Primary | ICD-10-CM

## 2019-04-05 DIAGNOSIS — Z98.891 HISTORY OF CESAREAN SECTION, CLASSICAL: ICD-10-CM

## 2019-04-05 PROCEDURE — 76816 OB US FOLLOW-UP PER FETUS: CPT | Performed by: OBSTETRICS & GYNECOLOGY

## 2019-04-05 PROCEDURE — 82962 GLUCOSE BLOOD TEST: CPT | Performed by: INTERNAL MEDICINE

## 2019-04-05 PROCEDURE — 0502F SUBSEQUENT PRENATAL CARE: CPT | Performed by: OBSTETRICS & GYNECOLOGY

## 2019-04-05 PROCEDURE — 99214 OFFICE O/P EST MOD 30 MIN: CPT | Performed by: INTERNAL MEDICINE

## 2019-04-05 PROCEDURE — 83036 HEMOGLOBIN GLYCOSYLATED A1C: CPT | Performed by: INTERNAL MEDICINE

## 2019-04-05 PROCEDURE — 36416 COLLJ CAPILLARY BLOOD SPEC: CPT | Performed by: INTERNAL MEDICINE

## 2019-04-05 NOTE — TELEPHONE ENCOUNTER
Please call pharmacy to clarify script - she is using 500 units of U100 per day in pump - she needs more vials of insulin.

## 2019-04-05 NOTE — PROGRESS NOTES
Name: Mulu Harris  Date: 4/5/2019    Referring Physician: No ref. provider found    HISTORY OF PRESENT ILLNESS   Mulu Harris is a 27year old female who presents for diabetes mellitus.   She was diagnosed with DM2 one year after delivery wit Outpatient Medications:   •  insulin regular human, conc, (HUMULIN R U-500, CONCENTRATED,) 500 UNIT/ML Subcutaneous Solution, INJECT VIA INSULIN PUMP.  MAXIMUM 150 UNITS DAILY, Disp: 40 mL, Rfl: 1  •  LEVOTHYROXINE SODIUM 175 MCG Oral Tab, TAKE 1 TABLET (17 NEEDLE MARIA G U/F) 32G X 4 MM Does not apply Misc, Inject 2 times per day, Disp: 200 each, Rfl: 1  •  LEVOTHYROXINE SODIUM 175 MCG Oral Tab, TAKE 1 TABLET (175 MCG TOTAL) BY MOUTH BEFORE BREAKFAST., Disp: 30 tablet, Rfl: 0  •  Insulin Pen Needle (BD PEN NEED 12.8 oz (143.7 kg)   BMI 52.72 kg/m²     General Appearance:  alert, well developed, in no acute distress  Eyes:  normal conjunctivae, sclera. , normal sclera and normal pupils  Ears/Nose/Mouth/Throat/Neck:  no palpable thyroid nodules or cervical lymphaden MD

## 2019-04-05 NOTE — TELEPHONE ENCOUNTER
Contacted pharmacy and discussed in detail. Patient should get 2 vials of insulin with this next refill.

## 2019-04-10 ENCOUNTER — TELEPHONE (OUTPATIENT)
Dept: OBGYN | Age: 34
End: 2019-04-10

## 2019-04-19 ENCOUNTER — PATIENT MESSAGE (OUTPATIENT)
Dept: ENDOCRINOLOGY CLINIC | Facility: CLINIC | Age: 34
End: 2019-04-19

## 2019-04-22 ENCOUNTER — TELEPHONE (OUTPATIENT)
Dept: ENDOCRINOLOGY CLINIC | Facility: CLINIC | Age: 34
End: 2019-04-22

## 2019-04-23 NOTE — TELEPHONE ENCOUNTER
Late entry:  Patient had paged over weekend for low BG  She has gastric flu and has not been able to eat.    Discussed that she should take her pump off till she starts eating better/ notices Bg are going over 150  When she first resumes that pump, discusse

## 2019-04-25 ENCOUNTER — NURSE ONLY (OUTPATIENT)
Dept: ENDOCRINOLOGY CLINIC | Facility: CLINIC | Age: 34
End: 2019-04-25
Payer: COMMERCIAL

## 2019-04-25 ENCOUNTER — PATIENT MESSAGE (OUTPATIENT)
Dept: ENDOCRINOLOGY CLINIC | Facility: CLINIC | Age: 34
End: 2019-04-25

## 2019-04-25 DIAGNOSIS — E11.65 UNCONTROLLED TYPE 2 DIABETES MELLITUS WITH HYPERGLYCEMIA (HCC): Primary | ICD-10-CM

## 2019-04-25 NOTE — TELEPHONE ENCOUNTER
From: Park Perdomo  To:  Praveen Anthony MD  Sent: 4/25/2019 7:37 AM CDT  Subject: Test Results Question    Hi Dr. Chester Manning had a lot of lows recently and I know it's partially from the flu but yesterday even though I'm definitely not 100% my mom

## 2019-04-26 ENCOUNTER — OB CHECK (OUTPATIENT)
Dept: OBGYN | Age: 34
End: 2019-04-26

## 2019-04-26 VITALS
HEIGHT: 65 IN | DIASTOLIC BLOOD PRESSURE: 82 MMHG | BODY MASS INDEX: 48.82 KG/M2 | WEIGHT: 293 LBS | SYSTOLIC BLOOD PRESSURE: 136 MMHG

## 2019-04-26 DIAGNOSIS — Z23 NEED FOR TDAP VACCINATION: Primary | ICD-10-CM

## 2019-04-26 PROBLEM — Z34.83 ENCOUNTER FOR SUPERVISION OF NORMAL PREGNANCY IN MULTIGRAVIDA IN THIRD TRIMESTER: Status: ACTIVE | Noted: 2019-04-26

## 2019-04-26 PROCEDURE — 90715 TDAP VACCINE 7 YRS/> IM: CPT | Performed by: OBSTETRICS & GYNECOLOGY

## 2019-04-26 PROCEDURE — 0502F SUBSEQUENT PRENATAL CARE: CPT | Performed by: OBSTETRICS & GYNECOLOGY

## 2019-04-26 PROCEDURE — 90471 IMMUNIZATION ADMIN: CPT | Performed by: OBSTETRICS & GYNECOLOGY

## 2019-05-06 ENCOUNTER — HOSPITAL ENCOUNTER (OUTPATIENT)
Dept: ULTRASOUND IMAGING | Facility: HOSPITAL | Age: 34
Discharge: HOME | End: 2019-05-06
Attending: LEGAL MEDICINE
Payer: COMMERCIAL

## 2019-05-06 DIAGNOSIS — E05.90 HYPERTHYROIDISM: ICD-10-CM

## 2019-05-06 DIAGNOSIS — E11.9 DIABETES TYPE 2, CONTROLLED (HCC): ICD-10-CM

## 2019-05-06 PROCEDURE — 76816 OB US FOLLOW-UP PER FETUS: CPT | Performed by: LEGAL MEDICINE

## 2019-05-07 NOTE — IMAGING NOTE
Pt has a recurring order for US Growth. Spoke with Doctors' office and they stated it is not a level 2 US.

## 2019-05-11 ENCOUNTER — APPOINTMENT (OUTPATIENT)
Dept: LAB | Facility: HOSPITAL | Age: 34
End: 2019-05-11
Attending: INTERNAL MEDICINE
Payer: COMMERCIAL

## 2019-05-11 ENCOUNTER — OB CHECK (OUTPATIENT)
Dept: OBGYN | Age: 34
End: 2019-05-11

## 2019-05-11 ENCOUNTER — OFFICE VISIT (OUTPATIENT)
Dept: ENDOCRINOLOGY CLINIC | Facility: CLINIC | Age: 34
End: 2019-05-11
Payer: COMMERCIAL

## 2019-05-11 VITALS
WEIGHT: 293 LBS | HEART RATE: 106 BPM | BODY MASS INDEX: 53 KG/M2 | SYSTOLIC BLOOD PRESSURE: 136 MMHG | DIASTOLIC BLOOD PRESSURE: 79 MMHG

## 2019-05-11 VITALS
SYSTOLIC BLOOD PRESSURE: 130 MMHG | WEIGHT: 293 LBS | DIASTOLIC BLOOD PRESSURE: 82 MMHG | HEIGHT: 65 IN | BODY MASS INDEX: 48.82 KG/M2

## 2019-05-11 DIAGNOSIS — O24.113 PRE-EXISTING TYPE 2 DIABETES MELLITUS IN PREGNANCY IN THIRD TRIMESTER: Primary | ICD-10-CM

## 2019-05-11 DIAGNOSIS — O24.112 PRE-EXISTING TYPE 2 DIABETES MELLITUS DURING PREGNANCY IN SECOND TRIMESTER: Primary | ICD-10-CM

## 2019-05-11 DIAGNOSIS — Z34.83 ENCOUNTER FOR SUPERVISION OF NORMAL PREGNANCY IN MULTIGRAVIDA IN THIRD TRIMESTER: ICD-10-CM

## 2019-05-11 DIAGNOSIS — O09.522 ELDERLY MULTIGRAVIDA IN SECOND TRIMESTER: ICD-10-CM

## 2019-05-11 DIAGNOSIS — E06.3 HYPOTHYROIDISM DUE TO HASHIMOTO'S THYROIDITIS: ICD-10-CM

## 2019-05-11 DIAGNOSIS — E03.9 HYPOTHYROID IN PREGNANCY, ANTEPARTUM: ICD-10-CM

## 2019-05-11 DIAGNOSIS — E03.8 HYPOTHYROIDISM DUE TO HASHIMOTO'S THYROIDITIS: ICD-10-CM

## 2019-05-11 DIAGNOSIS — E66.01 MORBIDLY OBESE (CMD): ICD-10-CM

## 2019-05-11 DIAGNOSIS — O09.299 HX OF PREECLAMPSIA, PRIOR PREGNANCY, CURRENTLY PREGNANT: ICD-10-CM

## 2019-05-11 DIAGNOSIS — Z98.891 HISTORY OF CESAREAN SECTION, CLASSICAL: ICD-10-CM

## 2019-05-11 DIAGNOSIS — O99.280 HYPOTHYROID IN PREGNANCY, ANTEPARTUM: ICD-10-CM

## 2019-05-11 PROCEDURE — 36416 COLLJ CAPILLARY BLOOD SPEC: CPT | Performed by: INTERNAL MEDICINE

## 2019-05-11 PROCEDURE — 99214 OFFICE O/P EST MOD 30 MIN: CPT | Performed by: INTERNAL MEDICINE

## 2019-05-11 PROCEDURE — 0502F SUBSEQUENT PRENATAL CARE: CPT | Performed by: LEGAL MEDICINE

## 2019-05-11 PROCEDURE — 76815 OB US LIMITED FETUS(S): CPT | Performed by: LEGAL MEDICINE

## 2019-05-11 PROCEDURE — 59025 FETAL NON-STRESS TEST: CPT | Performed by: LEGAL MEDICINE

## 2019-05-11 PROCEDURE — 84443 ASSAY THYROID STIM HORMONE: CPT

## 2019-05-11 PROCEDURE — 82962 GLUCOSE BLOOD TEST: CPT | Performed by: INTERNAL MEDICINE

## 2019-05-11 PROCEDURE — 84439 ASSAY OF FREE THYROXINE: CPT

## 2019-05-11 NOTE — PROGRESS NOTES
Name: Leonard Vasquez  Date: 5/11/2019    Referring Physician: No ref. provider found    HISTORY OF PRESENT ILLNESS   Leonard Vasquez is a 27year old female who presents for diabetes mellitus.   She was diagnosed with DM2 one year after delivery wi 175mcg PO daily, taking medication in AM and waiting 30-60 min before eating.      Medications:     Current Outpatient Medications:   •  insulin regular human, conc, (HUMULIN R U-500, CONCENTRATED,) 500 UNIT/ML Subcutaneous Solution, INJECT VIA INSULIN PUMP Oral Tab, Take 1 tablet (4 mg total) by mouth every morning before breakfast., Disp: 90 tablet, Rfl: 1  •  Insulin Pen Needle (BD PEN NEEDLE MARIA G U/F) 32G X 4 MM Does not apply Misc, Inject 2 times per day, Disp: 200 each, Rfl: 1  •  LEVOTHYROXINE SODIUM 1 • KNEE SURGERY Right 2000    \"cartilidge removed from R knee\"         PHYSICAL EXAM  /79   Pulse 106   Wt (!) 319 lb (144.7 kg)   BMI 53.08 kg/m²     General Appearance:  alert, well developed, in no acute distress  Eyes:  normal conjunctivae, greater than 50% of the time was spent counseling the patient and/or coordinating care.     RTC 2 months (post delivery)    5/11/2019  Hawa Colby MD

## 2019-05-16 ENCOUNTER — TELEPHONE (OUTPATIENT)
Dept: OBGYN | Age: 34
End: 2019-05-16

## 2019-05-16 ENCOUNTER — PATIENT MESSAGE (OUTPATIENT)
Dept: ENDOCRINOLOGY CLINIC | Facility: CLINIC | Age: 34
End: 2019-05-16

## 2019-05-16 NOTE — TELEPHONE ENCOUNTER
I'm happy to schedule peer to peer although I thought that this issue was resolved. Also is she planning to continue pump after  or stop pump therapy post delivery? Thanks.

## 2019-05-16 NOTE — TELEPHONE ENCOUNTER
From: Cuca Avila  To: Jhonny Graf MD  Sent: 5/16/2019 11:45 AM CDT  Subject: Other    Hi Dr. Tripp Fraser,    So, I'm really freaking out now.  I'll spare you all the details but to make a long story short, I don't believe that Lisha Tomas from [employer] has

## 2019-05-16 NOTE — TELEPHONE ENCOUNTER
Patient states she is having some issues with Woodrow Colvin and [employer] not being very \"forthcoming\"/honest about what is going on with the pump.  Patient spoke with insurance Natalia Hester and they recommended the bet course of action be a Peer to Peer review and that

## 2019-05-16 NOTE — TELEPHONE ENCOUNTER
Patient states the issues with [employer] were never resolved or even in the process of being resolved. She has spoken with a [employer] supervisor and has filed a complaint.  As far as postpartum DM management, patient states she's hoping she won't need pump

## 2019-05-16 NOTE — TELEPHONE ENCOUNTER
[id number] Scarlett Jonas, we are going to have to reach out to [employer] for an update and ask them if peer to peer is necessary. Maybe new  or processor. Thanks.

## 2019-05-17 NOTE — TELEPHONE ENCOUNTER
RN left message for new , Marielle Harris at [phone] to discuss pump and billing issues. Will await response.

## 2019-05-17 NOTE — TELEPHONE ENCOUNTER
RN spoke with Detra Shone who states [employer] hired a third party review company beginning Jan 2019 - which resulted in many unnecessary denials due to them not received documentation properly/timely.  [employer] is now requesting predetermination on all [employer] patients

## 2019-05-20 RX ORDER — LEVOTHYROXINE SODIUM 175 UG/1
175 TABLET ORAL
Qty: 30 TABLET | Refills: 2 | Status: SHIPPED | OUTPATIENT
Start: 2019-05-20 | End: 2019-08-20

## 2019-05-20 NOTE — TELEPHONE ENCOUNTER
Dr Mark diane from [employer] left a message to request a naritive letter from you stating why it is medically necessary for this patient to have a pump for DM. He will process appeal retroactively once he receives this narrative.  OK to Fax

## 2019-05-21 ENCOUNTER — LAB SERVICES (OUTPATIENT)
Dept: LAB | Age: 34
End: 2019-05-21

## 2019-05-21 ENCOUNTER — OB CHECK (OUTPATIENT)
Dept: OBGYN | Age: 34
End: 2019-05-21

## 2019-05-21 VITALS
SYSTOLIC BLOOD PRESSURE: 132 MMHG | BODY MASS INDEX: 48.82 KG/M2 | WEIGHT: 293 LBS | HEIGHT: 65 IN | DIASTOLIC BLOOD PRESSURE: 78 MMHG

## 2019-05-21 DIAGNOSIS — Z13.0 SCREENING FOR IRON DEFICIENCY ANEMIA: ICD-10-CM

## 2019-05-21 DIAGNOSIS — Z34.83 ENCOUNTER FOR SUPERVISION OF NORMAL PREGNANCY IN MULTIGRAVIDA IN THIRD TRIMESTER: Primary | ICD-10-CM

## 2019-05-21 DIAGNOSIS — O09.299 HX OF PREECLAMPSIA, PRIOR PREGNANCY, CURRENTLY PREGNANT: ICD-10-CM

## 2019-05-21 DIAGNOSIS — Z11.4 SCREENING FOR HIV (HUMAN IMMUNODEFICIENCY VIRUS): ICD-10-CM

## 2019-05-21 DIAGNOSIS — E03.9 HYPOTHYROID IN PREGNANCY, ANTEPARTUM: ICD-10-CM

## 2019-05-21 DIAGNOSIS — Z34.83 ENCOUNTER FOR SUPERVISION OF NORMAL PREGNANCY IN MULTIGRAVIDA IN THIRD TRIMESTER: ICD-10-CM

## 2019-05-21 DIAGNOSIS — E66.01 MORBIDLY OBESE (CMD): ICD-10-CM

## 2019-05-21 DIAGNOSIS — O09.522 ELDERLY MULTIGRAVIDA IN SECOND TRIMESTER: ICD-10-CM

## 2019-05-21 DIAGNOSIS — O99.280 HYPOTHYROID IN PREGNANCY, ANTEPARTUM: ICD-10-CM

## 2019-05-21 DIAGNOSIS — Z98.891 HISTORY OF CESAREAN SECTION, CLASSICAL: ICD-10-CM

## 2019-05-21 PROBLEM — O24.112 PRE-EXISTING TYPE 2 DIABETES MELLITUS DURING PREGNANCY IN SECOND TRIMESTER: Status: RESOLVED | Noted: 2019-02-08 | Resolved: 2019-05-21

## 2019-05-21 PROCEDURE — 76815 OB US LIMITED FETUS(S): CPT | Performed by: OBSTETRICS & GYNECOLOGY

## 2019-05-21 PROCEDURE — 0502F SUBSEQUENT PRENATAL CARE: CPT | Performed by: OBSTETRICS & GYNECOLOGY

## 2019-05-21 PROCEDURE — 59025 FETAL NON-STRESS TEST: CPT | Performed by: OBSTETRICS & GYNECOLOGY

## 2019-05-21 PROCEDURE — 36415 COLL VENOUS BLD VENIPUNCTURE: CPT | Performed by: OBSTETRICS & GYNECOLOGY

## 2019-05-22 ENCOUNTER — TELEPHONE (OUTPATIENT)
Dept: OBGYN | Age: 34
End: 2019-05-22

## 2019-05-22 LAB
ERYTHROCYTE [DISTWIDTH] IN BLOOD: 15.9 % (ref 11–15)
HCT VFR BLD CALC: 37.2 % (ref 36–46.5)
HGB BLD-MCNC: 12 G/DL (ref 12–15.5)
HIV 1+2 AB+HIV1 P24 AG SERPL QL IA: NONREACTIVE
MCH RBC QN AUTO: 27.3 PG (ref 26–34)
MCHC RBC AUTO-ENTMCNC: 32.3 G/DL (ref 32–36.5)
MCV RBC AUTO: 84.5 FL (ref 78–100)
NRBC BLD MANUAL-RTO: 0 /100 WBC
PLATELET # BLD: 175 K/MCL (ref 140–450)
RBC # BLD: 4.4 MIL/MCL (ref 4–5.2)
WBC # BLD: 9.3 K/MCL (ref 4.2–11)

## 2019-05-22 NOTE — TELEPHONE ENCOUNTER
Letter signed and handed to [facility] from [employer] with most recent LOV note, pump download and A1C.

## 2019-06-03 ENCOUNTER — HOSPITAL ENCOUNTER (OUTPATIENT)
Dept: ULTRASOUND IMAGING | Facility: HOSPITAL | Age: 34
Discharge: HOME | End: 2019-06-03
Attending: LEGAL MEDICINE
Payer: COMMERCIAL

## 2019-06-03 DIAGNOSIS — E11.9 DIABETES TYPE 2, CONTROLLED (HCC): ICD-10-CM

## 2019-06-03 DIAGNOSIS — E05.90 HYPERTHYROIDISM: ICD-10-CM

## 2019-06-03 PROCEDURE — 76816 OB US FOLLOW-UP PER FETUS: CPT | Performed by: LEGAL MEDICINE

## 2019-06-04 ENCOUNTER — HOSPITAL (OUTPATIENT)
Dept: OTHER | Age: 34
End: 2019-06-04

## 2019-06-04 ENCOUNTER — OB CHECK (OUTPATIENT)
Dept: OBGYN | Age: 34
End: 2019-06-04

## 2019-06-04 VITALS
SYSTOLIC BLOOD PRESSURE: 130 MMHG | WEIGHT: 293 LBS | HEIGHT: 65 IN | DIASTOLIC BLOOD PRESSURE: 78 MMHG | BODY MASS INDEX: 48.82 KG/M2

## 2019-06-04 DIAGNOSIS — E03.9 HYPOTHYROID IN PREGNANCY, ANTEPARTUM: ICD-10-CM

## 2019-06-04 DIAGNOSIS — E66.01 MORBIDLY OBESE (CMD): ICD-10-CM

## 2019-06-04 DIAGNOSIS — Z34.83 ENCOUNTER FOR SUPERVISION OF NORMAL PREGNANCY IN MULTIGRAVIDA IN THIRD TRIMESTER: ICD-10-CM

## 2019-06-04 DIAGNOSIS — O99.280 HYPOTHYROID IN PREGNANCY, ANTEPARTUM: ICD-10-CM

## 2019-06-04 DIAGNOSIS — O09.522 ELDERLY MULTIGRAVIDA IN SECOND TRIMESTER: ICD-10-CM

## 2019-06-04 DIAGNOSIS — Z98.891 HISTORY OF CESAREAN SECTION, CLASSICAL: Primary | ICD-10-CM

## 2019-06-04 DIAGNOSIS — O09.299 HX OF PREECLAMPSIA, PRIOR PREGNANCY, CURRENTLY PREGNANT: ICD-10-CM

## 2019-06-04 PROBLEM — Z01.818 PREOP EXAMINATION: Status: ACTIVE | Noted: 2019-06-04

## 2019-06-04 PROCEDURE — 0502F SUBSEQUENT PRENATAL CARE: CPT | Performed by: LEGAL MEDICINE

## 2019-06-04 RX ORDER — BREAST PUMP
EACH MISCELLANEOUS
Qty: 1 EACH | Refills: 0 | Status: SHIPPED | OUTPATIENT
Start: 2019-06-04 | End: 2021-08-04 | Stop reason: ALTCHOICE

## 2019-06-04 SDOH — HEALTH STABILITY: MENTAL HEALTH: HOW OFTEN DO YOU HAVE A DRINK CONTAINING ALCOHOL?: NEVER

## 2019-06-07 LAB
ALBUMIN SERPL-MCNC: 2.3 G/DL (ref 3.6–5.1)
ALBUMIN SERPL-MCNC: 2.3 G/DL (ref 3.6–5.1)
ALBUMIN/GLOB SERPL: 0.6 {RATIO} (ref 1–2.4)
ALBUMIN/GLOB SERPL: 0.6 {RATIO} (ref 1–2.4)
ALP SERPL-CCNC: 94 UNITS/L (ref 45–117)
ALP SERPL-CCNC: 96 UNITS/L (ref 45–117)
ALT SERPL-CCNC: 20 UNITS/L
ALT SERPL-CCNC: 20 UNITS/L
ANALYZER ANC (IANC): ABNORMAL
ANION GAP SERPL CALC-SCNC: 11 MMOL/L (ref 10–20)
ANION GAP SERPL CALC-SCNC: 13 MMOL/L (ref 10–20)
APTT PPP: 29 SEC (ref 22–32)
APTT PPP: 29 SEC (ref 22–32)
APTT PPP: NORMAL S
AST SERPL-CCNC: 28 UNITS/L
AST SERPL-CCNC: 32 UNITS/L
BASOPHILS # BLD: 0 K/MCL (ref 0–0.3)
BASOPHILS # BLD: 0 K/MCL (ref 0–0.3)
BASOPHILS NFR BLD: 0 %
BASOPHILS NFR BLD: 0 %
BILIRUB SERPL-MCNC: 0.2 MG/DL (ref 0.2–1)
BILIRUB SERPL-MCNC: 0.2 MG/DL (ref 0.2–1)
BUN SERPL-MCNC: 11 MG/DL (ref 6–20)
BUN SERPL-MCNC: 12 MG/DL (ref 6–20)
BUN/CREAT SERPL: 13 (ref 7–25)
BUN/CREAT SERPL: 14 (ref 7–25)
CALCIUM SERPL-MCNC: 9 MG/DL (ref 8.4–10.2)
CALCIUM SERPL-MCNC: 9.2 MG/DL (ref 8.4–10.2)
CHLORIDE SERPL-SCNC: 103 MMOL/L (ref 98–107)
CHLORIDE SERPL-SCNC: 107 MMOL/L (ref 98–107)
CO2 SERPL-SCNC: 26 MMOL/L (ref 21–32)
CO2 SERPL-SCNC: 27 MMOL/L (ref 21–32)
CREAT SERPL-MCNC: 0.8 MG/DL (ref 0.51–0.95)
CREAT SERPL-MCNC: 0.89 MG/DL (ref 0.51–0.95)
CREAT UR-MCNC: 182 MG/DL
CREAT UR-MCNC: ABNORMAL MG/DL
DIFFERENTIAL METHOD BLD: ABNORMAL
DIFFERENTIAL METHOD BLD: ABNORMAL
EOSINOPHIL # BLD: 0.1 K/MCL (ref 0.1–0.5)
EOSINOPHIL # BLD: 0.1 K/MCL (ref 0.1–0.5)
EOSINOPHIL NFR BLD: 1 %
EOSINOPHIL NFR BLD: 1 %
ERYTHROCYTE [DISTWIDTH] IN BLOOD: 15.5 % (ref 11–15)
ERYTHROCYTE [DISTWIDTH] IN BLOOD: 15.9 % (ref 11–15)
ERYTHROCYTE [DISTWIDTH] IN BLOOD: 15.9 % (ref 11–15)
FIBRINOGEN PPP-MCNC: 512 MG/DL (ref 190–425)
FIBRINOGEN PPP-MCNC: 531 MG/DL (ref 190–425)
GLOBULIN SER-MCNC: 4 G/DL (ref 2–4)
GLOBULIN SER-MCNC: 4.1 G/DL (ref 2–4)
GLUCOSE BLDC GLUCOMTR-MCNC: 163 MG/DL (ref 65–99)
GLUCOSE BLDC GLUCOMTR-MCNC: 68 MG/DL (ref 65–99)
GLUCOSE BLDC GLUCOMTR-MCNC: 90 MG/DL (ref 65–99)
GLUCOSE BLDC GLUCOMTR-MCNC: 90 MG/DL (ref 65–99)
GLUCOSE BLDC GLUCOMTR-MCNC: 94 MG/DL (ref 65–99)
GLUCOSE BLDC GLUCOMTR-MCNC: 95 MG/DL (ref 65–99)
GLUCOSE BLDC GLUCOMTR-MCNC: ABNORMAL MG/DL
GLUCOSE BLDC GLUCOMTR-MCNC: ABNORMAL MG/DL
GLUCOSE SERPL-MCNC: 129 MG/DL (ref 65–99)
GLUCOSE SERPL-MCNC: 92 MG/DL (ref 65–99)
HCT VFR BLD CALC: 33.9 % (ref 36–46.5)
HCT VFR BLD CALC: 35.6 % (ref 36–46.5)
HCT VFR BLD CALC: 36.7 % (ref 36–46.5)
HGB BLD-MCNC: 10.9 G/DL (ref 12–15.5)
HGB BLD-MCNC: 11.5 G/DL (ref 12–15.5)
HGB BLD-MCNC: 11.8 G/DL (ref 12–15.5)
IMM GRANULOCYTES # BLD AUTO: 0 K/MCL (ref 0–0.2)
IMM GRANULOCYTES # BLD AUTO: 0.1 K/MCL (ref 0–0.2)
IMM GRANULOCYTES NFR BLD: 1 %
IMM GRANULOCYTES NFR BLD: 1 %
INR PPP: 0.9
INR PPP: 0.9
INR PPP: ABNORMAL
INR PPP: ABNORMAL
LYMPHOCYTES # BLD: 1.6 K/MCL (ref 1–4.8)
LYMPHOCYTES # BLD: 1.9 K/MCL (ref 1–4.8)
LYMPHOCYTES NFR BLD: 17 %
LYMPHOCYTES NFR BLD: 19 %
MCH RBC QN AUTO: 27.3 PG (ref 26–34)
MCH RBC QN AUTO: 27.3 PG (ref 26–34)
MCH RBC QN AUTO: 27.7 PG (ref 26–34)
MCHC RBC AUTO-ENTMCNC: 32.2 G/DL (ref 32–36.5)
MCHC RBC AUTO-ENTMCNC: 32.2 G/DL (ref 32–36.5)
MCHC RBC AUTO-ENTMCNC: 32.3 G/DL (ref 32–36.5)
MCV RBC AUTO: 84.6 FL (ref 78–100)
MCV RBC AUTO: 84.8 FL (ref 78–100)
MCV RBC AUTO: 86 FL (ref 78–100)
MONOCYTES # BLD: 0.6 K/MCL (ref 0.3–0.9)
MONOCYTES # BLD: 0.7 K/MCL (ref 0.3–0.9)
MONOCYTES NFR BLD: 6 %
MONOCYTES NFR BLD: 7 %
NEUTROPHILS # BLD: 6.1 K/MCL (ref 1.8–7.7)
NEUTROPHILS # BLD: 8.6 K/MCL (ref 1.8–7.7)
NEUTROPHILS NFR BLD: 72 %
NEUTROPHILS NFR BLD: 75 %
NEUTS SEG NFR BLD: ABNORMAL %
NEUTS SEG NFR BLD: ABNORMAL %
NRBC (NRBCRE): 0 /100 WBC
PLATELET # BLD: 151 K/MCL (ref 140–450)
PLATELET # BLD: 161 K/MCL (ref 140–450)
PLATELET # BLD: 166 K/MCL (ref 140–450)
POTASSIUM SERPL-SCNC: 4.3 MMOL/L (ref 3.4–5.1)
POTASSIUM SERPL-SCNC: 4.6 MMOL/L (ref 3.4–5.1)
PROT SERPL-MCNC: 6.3 G/DL (ref 6.4–8.2)
PROT SERPL-MCNC: 6.4 G/DL (ref 6.4–8.2)
PROT UR-MCNC: 371 MG/DL
PROT/CREAT UR: 2038 MGPR/GCR
PROTHROMBIN TIME (PRT2): ABNORMAL
PROTHROMBIN TIME (PRT2): ABNORMAL
PROTHROMBIN TIME: 9.5 SEC (ref 9.7–11.8)
PROTHROMBIN TIME: 9.5 SEC (ref 9.7–11.8)
RBC # BLD: 3.94 MIL/MCL (ref 4–5.2)
RBC # BLD: 4.21 MIL/MCL (ref 4–5.2)
RBC # BLD: 4.33 MIL/MCL (ref 4–5.2)
SERVICE CMNT-IMP: ABNORMAL
SERVICE CMNT-IMP: ABNORMAL
SODIUM SERPL-SCNC: 138 MMOL/L (ref 135–145)
SODIUM SERPL-SCNC: 140 MMOL/L (ref 135–145)
URATE SERPL-MCNC: 6.4 MG/DL (ref 2.6–5.9)
URATE SERPL-MCNC: 7 MG/DL (ref 2.6–5.9)
URATE SERPL-MCNC: ABNORMAL MG/DL
WBC # BLD: 11.4 K/MCL (ref 4.2–11)
WBC # BLD: 8.4 K/MCL (ref 4.2–11)
WBC # BLD: 8.7 K/MCL (ref 4.2–11)

## 2019-06-07 PROCEDURE — 59514 CESAREAN DELIVERY ONLY: CPT | Performed by: OBSTETRICS & GYNECOLOGY

## 2019-06-07 PROCEDURE — 59510 CESAREAN DELIVERY: CPT | Performed by: LEGAL MEDICINE

## 2019-06-08 LAB
ALBUMIN SERPL-MCNC: 2.1 G/DL (ref 3.6–5.1)
ALBUMIN/GLOB SERPL: 0.5 {RATIO} (ref 1–2.4)
ALP SERPL-CCNC: 85 UNITS/L (ref 45–117)
ALT SERPL-CCNC: 21 UNITS/L
ANALYZER ANC (IANC): ABNORMAL
ANALYZER ANC (IANC): ABNORMAL
ANION GAP SERPL CALC-SCNC: 10 MMOL/L (ref 10–20)
APTT PPP: 30 SEC (ref 22–32)
APTT PPP: NORMAL S
APTT PPP: NORMAL S
AST SERPL-CCNC: 37 UNITS/L
BASOPHILS # BLD: 0 K/MCL (ref 0–0.3)
BASOPHILS NFR BLD: 0 %
BILIRUB SERPL-MCNC: 0.2 MG/DL (ref 0.2–1)
BUN SERPL-MCNC: 10 MG/DL (ref 6–20)
BUN/CREAT SERPL: 13 (ref 7–25)
CALCIUM SERPL-MCNC: 8.7 MG/DL (ref 8.4–10.2)
CHLORIDE SERPL-SCNC: 106 MMOL/L (ref 98–107)
CO2 SERPL-SCNC: 26 MMOL/L (ref 21–32)
CREAT SERPL-MCNC: 0.76 MG/DL (ref 0.51–0.95)
DIFFERENTIAL METHOD BLD: ABNORMAL
EOSINOPHIL # BLD: 0.1 K/MCL (ref 0.1–0.5)
EOSINOPHIL NFR BLD: 1 %
ERYTHROCYTE [DISTWIDTH] IN BLOOD: 16 % (ref 11–15)
ERYTHROCYTE [DISTWIDTH] IN BLOOD: 16 % (ref 11–15)
FIBRINOGEN PPP-MCNC: 575 MG/DL (ref 190–425)
GLOBULIN SER-MCNC: 4.1 G/DL (ref 2–4)
GLUCOSE BLDC GLUCOMTR-MCNC: 111 MG/DL (ref 65–99)
GLUCOSE BLDC GLUCOMTR-MCNC: 113 MG/DL (ref 65–99)
GLUCOSE BLDC GLUCOMTR-MCNC: 157 MG/DL (ref 65–99)
GLUCOSE BLDC GLUCOMTR-MCNC: 172 MG/DL (ref 65–99)
GLUCOSE BLDC GLUCOMTR-MCNC: ABNORMAL MG/DL
GLUCOSE BLDC GLUCOMTR-MCNC: ABNORMAL MG/DL
GLUCOSE SERPL-MCNC: 123 MG/DL (ref 65–99)
HCT VFR BLD CALC: 31.4 % (ref 36–46.5)
HCT VFR BLD CALC: 32.7 % (ref 36–46.5)
HGB BLD-MCNC: 10.2 G/DL (ref 12–15.5)
HGB BLD-MCNC: 10.2 G/DL (ref 12–15.5)
IMM GRANULOCYTES # BLD AUTO: 0.1 K/MCL (ref 0–0.2)
IMM GRANULOCYTES NFR BLD: 1 %
LYMPHOCYTES # BLD: 1.1 K/MCL (ref 1–4.8)
LYMPHOCYTES NFR BLD: 13 %
MCH RBC QN AUTO: 27.3 PG (ref 26–34)
MCH RBC QN AUTO: 27.6 PG (ref 26–34)
MCHC RBC AUTO-ENTMCNC: 31.2 G/DL (ref 32–36.5)
MCHC RBC AUTO-ENTMCNC: 32.5 G/DL (ref 32–36.5)
MCV RBC AUTO: 85.1 FL (ref 78–100)
MCV RBC AUTO: 87.4 FL (ref 78–100)
MONOCYTES # BLD: 0.5 K/MCL (ref 0.3–0.9)
MONOCYTES NFR BLD: 6 %
NEUTROPHILS # BLD: 6.5 K/MCL (ref 1.8–7.7)
NEUTROPHILS NFR BLD: 79 %
NEUTS SEG NFR BLD: ABNORMAL %
NRBC (NRBCRE): 0 /100 WBC
NRBC (NRBCRE): 0 /100 WBC
PLATELET # BLD: 136 K/MCL (ref 140–450)
PLATELET # BLD: 146 K/MCL (ref 140–450)
POTASSIUM SERPL-SCNC: 4.4 MMOL/L (ref 3.4–5.1)
PROT SERPL-MCNC: 6.2 G/DL (ref 6.4–8.2)
RBC # BLD: 3.69 MIL/MCL (ref 4–5.2)
RBC # BLD: 3.74 MIL/MCL (ref 4–5.2)
SERVICE CMNT-IMP: ABNORMAL
SODIUM SERPL-SCNC: 138 MMOL/L (ref 135–145)
URATE SERPL-MCNC: 7.2 MG/DL (ref 2.6–5.9)
URATE SERPL-MCNC: ABNORMAL MG/DL
URATE SERPL-MCNC: ABNORMAL MG/DL
WBC # BLD: 8.2 K/MCL (ref 4.2–11)
WBC # BLD: 9.5 K/MCL (ref 4.2–11)

## 2019-06-09 LAB
ALBUMIN SERPL-MCNC: 2.3 G/DL (ref 3.6–5.1)
ALBUMIN/GLOB SERPL: 0.5 {RATIO} (ref 1–2.4)
ALP SERPL-CCNC: 80 UNITS/L (ref 45–117)
ALT SERPL-CCNC: 24 UNITS/L
ANALYZER ANC (IANC): ABNORMAL
ANION GAP SERPL CALC-SCNC: 7 MMOL/L (ref 10–20)
APTT PPP: 31 SEC (ref 22–32)
APTT PPP: NORMAL S
APTT PPP: NORMAL S
AST SERPL-CCNC: 34 UNITS/L
BILIRUB SERPL-MCNC: 0.2 MG/DL (ref 0.2–1)
BUN SERPL-MCNC: 10 MG/DL (ref 6–20)
BUN/CREAT SERPL: 13 (ref 7–25)
CALCIUM SERPL-MCNC: 9 MG/DL (ref 8.4–10.2)
CHLORIDE SERPL-SCNC: 107 MMOL/L (ref 98–107)
CO2 SERPL-SCNC: 30 MMOL/L (ref 21–32)
CREAT SERPL-MCNC: 0.78 MG/DL (ref 0.51–0.95)
ERYTHROCYTE [DISTWIDTH] IN BLOOD: 16.1 % (ref 11–15)
FIBRINOGEN PPP-MCNC: 630 MG/DL (ref 190–425)
GLOBULIN SER-MCNC: 4.2 G/DL (ref 2–4)
GLUCOSE BLDC GLUCOMTR-MCNC: 108 MG/DL (ref 65–99)
GLUCOSE BLDC GLUCOMTR-MCNC: 119 MG/DL (ref 65–99)
GLUCOSE BLDC GLUCOMTR-MCNC: 125 MG/DL (ref 65–99)
GLUCOSE BLDC GLUCOMTR-MCNC: 129 MG/DL (ref 65–99)
GLUCOSE BLDC GLUCOMTR-MCNC: 143 MG/DL (ref 65–99)
GLUCOSE BLDC GLUCOMTR-MCNC: 168 MG/DL (ref 65–99)
GLUCOSE BLDC GLUCOMTR-MCNC: ABNORMAL MG/DL
GLUCOSE SERPL-MCNC: 130 MG/DL (ref 65–99)
HCT VFR BLD CALC: 32.3 % (ref 36–46.5)
HGB BLD-MCNC: 10.2 G/DL (ref 12–15.5)
INR PPP: 0.9
INR PPP: ABNORMAL
MCH RBC QN AUTO: 27.3 PG (ref 26–34)
MCHC RBC AUTO-ENTMCNC: 31.6 G/DL (ref 32–36.5)
MCV RBC AUTO: 86.4 FL (ref 78–100)
NRBC (NRBCRE): 0 /100 WBC
PLATELET # BLD: 159 K/MCL (ref 140–450)
POTASSIUM SERPL-SCNC: 4.3 MMOL/L (ref 3.4–5.1)
PROT SERPL-MCNC: 6.5 G/DL (ref 6.4–8.2)
PROTHROMBIN TIME (PRT2): ABNORMAL
PROTHROMBIN TIME: 9.2 SEC (ref 9.7–11.8)
PROTHROMBIN TIME: ABNORMAL S
RBC # BLD: 3.74 MIL/MCL (ref 4–5.2)
SERVICE CMNT-IMP: ABNORMAL
SODIUM SERPL-SCNC: 140 MMOL/L (ref 135–145)
URATE SERPL-MCNC: 6.7 MG/DL (ref 2.6–5.9)
URATE SERPL-MCNC: ABNORMAL MG/DL
URATE SERPL-MCNC: ABNORMAL MG/DL
WBC # BLD: 7.4 K/MCL (ref 4.2–11)

## 2019-06-10 LAB
GLUCOSE BLDC GLUCOMTR-MCNC: 117 MG/DL (ref 65–99)
GLUCOSE BLDC GLUCOMTR-MCNC: 122 MG/DL (ref 65–99)
GLUCOSE BLDC GLUCOMTR-MCNC: 142 MG/DL (ref 65–99)
GLUCOSE BLDC GLUCOMTR-MCNC: 93 MG/DL (ref 65–99)
GLUCOSE BLDC GLUCOMTR-MCNC: ABNORMAL MG/DL

## 2019-06-11 DIAGNOSIS — G89.18 POSTOPERATIVE PAIN: Primary | ICD-10-CM

## 2019-06-11 DIAGNOSIS — Z78.9 NO CONTRAINDICATION TO DEEP VEIN THROMBOSIS (DVT) PROPHYLAXIS: ICD-10-CM

## 2019-06-11 LAB
GLUCOSE BLDC GLUCOMTR-MCNC: 106 MG/DL (ref 65–99)
GLUCOSE BLDC GLUCOMTR-MCNC: 112 MG/DL (ref 65–99)
GLUCOSE BLDC GLUCOMTR-MCNC: 125 MG/DL (ref 65–99)
GLUCOSE BLDC GLUCOMTR-MCNC: ABNORMAL MG/DL

## 2019-06-11 RX ORDER — ENOXAPARIN SODIUM 100 MG/ML
60 INJECTION SUBCUTANEOUS DAILY
Status: SHIPPED | OUTPATIENT
Start: 2019-06-12 | End: 2019-06-28

## 2019-06-11 RX ORDER — HYDROCODONE BITARTRATE AND ACETAMINOPHEN 10; 325 MG/1; MG/1
1 TABLET ORAL EVERY 6 HOURS PRN
Qty: 15 TABLET | Refills: 0 | Status: SHIPPED | OUTPATIENT
Start: 2019-06-11 | End: 2021-08-04 | Stop reason: ALTCHOICE

## 2019-06-12 DIAGNOSIS — Z78.9 NO CONTRAINDICATION TO DEEP VEIN THROMBOSIS (DVT) PROPHYLAXIS: Primary | ICD-10-CM

## 2019-06-12 RX ORDER — ENOXAPARIN SODIUM 100 MG/ML
60 INJECTION SUBCUTANEOUS DAILY
Qty: 16 SYRINGE | Refills: 0 | Status: SHIPPED | OUTPATIENT
Start: 2019-06-12 | End: 2019-06-28

## 2019-06-13 ENCOUNTER — TELEPHONE (OUTPATIENT)
Dept: OBGYN | Age: 34
End: 2019-06-13

## 2019-06-14 ENCOUNTER — POSTPARTUM VISIT (OUTPATIENT)
Dept: OBGYN | Age: 34
End: 2019-06-14

## 2019-06-14 VITALS
HEIGHT: 65 IN | BODY MASS INDEX: 48.82 KG/M2 | DIASTOLIC BLOOD PRESSURE: 82 MMHG | SYSTOLIC BLOOD PRESSURE: 126 MMHG | WEIGHT: 293 LBS

## 2019-06-14 PROCEDURE — 99024 POSTOP FOLLOW-UP VISIT: CPT | Performed by: LEGAL MEDICINE

## 2019-06-14 PROCEDURE — 96127 BRIEF EMOTIONAL/BEHAV ASSMT: CPT | Performed by: LEGAL MEDICINE

## 2019-06-14 SDOH — HEALTH STABILITY: MENTAL HEALTH: HOW OFTEN DO YOU HAVE A DRINK CONTAINING ALCOHOL?: NEVER

## 2019-06-14 ASSESSMENT — EDINBURGH POSTNATAL DEPRESSION SCALE (EPDS)
I HAVE BEEN SO UNHAPPY THAT I HAVE BEEN CRYING: ONLY OCCASIONALLY
I HAVE BEEN ANXIOUS OR WORRIED FOR NO GOOD REASON: YES, SOMETIMES
I HAVE FELT SCARED OR PANICKY FOR NO GOOD REASON: YES, SOMETIMES
I HAVE BEEN SO UNHAPPY THAT I HAVE HAD DIFFICULTY SLEEPING: YES, SOMETIMES
THINGS HAVE BEEN GETTING ON TOP OF ME: YES, SOMETIMES I HAVEN'T BEEN COPING AS WELL AS USUAL
I HAVE LOOKED FORWARD WITH ENJOYMENT TO THINGS: AS MUCH AS I EVER DID
THE THOUGHT OF HARMING MYSELF HAS OCCURRED TO ME: NEVER
I HAVE BLAMED MYSELF UNNECESSARILY WHEN THINGS WENT WRONG: YES, SOME OF THE TIME
I HAVE BEEN ABLE TO LAUGH AND SEE THE FUNNY SIDE OF THINGS: AS MUCH AS I ALWAYS COULD

## 2019-06-17 RX ORDER — INSULIN ASPART 100 [IU]/ML
INJECTION, SOLUTION INTRAVENOUS; SUBCUTANEOUS
Qty: 15 ML | Refills: 0 | Status: SHIPPED | OUTPATIENT
Start: 2019-06-17 | End: 2019-07-12

## 2019-06-18 ENCOUNTER — TELEPHONE (OUTPATIENT)
Dept: OBGYN | Age: 34
End: 2019-06-18

## 2019-06-25 ENCOUNTER — TELEPHONE (OUTPATIENT)
Dept: ENDOCRINOLOGY CLINIC | Facility: CLINIC | Age: 34
End: 2019-06-25

## 2019-06-25 NOTE — TELEPHONE ENCOUNTER
Current Outpatient Medications:  insulin regular human, conc, (HUMULIN R U-500, CONCENTRATED,) 500 UNIT/ML Subcutaneous Solution INJECT VIA INSULIN PUMP.  MAXIMUM 150 UNITS DAILY Disp: 40 mL Rfl: 0     Per pharmacy Alt request need to get 90 day supply 60

## 2019-06-27 ENCOUNTER — TELEPHONE (OUTPATIENT)
Dept: ADMINISTRATIVE | Age: 34
End: 2019-06-27

## 2019-06-28 NOTE — TELEPHONE ENCOUNTER
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 Logged for processing. Message sent to dakick about HIPAA & fee.  NK

## 2019-07-12 ENCOUNTER — OFFICE VISIT (OUTPATIENT)
Dept: ENDOCRINOLOGY CLINIC | Facility: CLINIC | Age: 34
End: 2019-07-12
Payer: COMMERCIAL

## 2019-07-12 VITALS
WEIGHT: 291 LBS | DIASTOLIC BLOOD PRESSURE: 95 MMHG | BODY MASS INDEX: 48 KG/M2 | SYSTOLIC BLOOD PRESSURE: 134 MMHG | HEART RATE: 99 BPM

## 2019-07-12 DIAGNOSIS — E11.65 UNCONTROLLED TYPE 2 DIABETES MELLITUS WITH HYPERGLYCEMIA (HCC): Primary | ICD-10-CM

## 2019-07-12 LAB
CARTRIDGE LOT#: ABNORMAL NUMERIC
GLUCOSE BLOOD: 203
HEMOGLOBIN A1C: 7.4 % (ref 4.3–5.6)
TEST STRIP LOT #: NORMAL NUMERIC

## 2019-07-12 PROCEDURE — 83036 HEMOGLOBIN GLYCOSYLATED A1C: CPT | Performed by: INTERNAL MEDICINE

## 2019-07-12 PROCEDURE — 36416 COLLJ CAPILLARY BLOOD SPEC: CPT | Performed by: INTERNAL MEDICINE

## 2019-07-12 PROCEDURE — 99214 OFFICE O/P EST MOD 30 MIN: CPT | Performed by: INTERNAL MEDICINE

## 2019-07-12 PROCEDURE — 82962 GLUCOSE BLOOD TEST: CPT | Performed by: INTERNAL MEDICINE

## 2019-07-12 RX ORDER — BLOOD SUGAR DIAGNOSTIC
STRIP MISCELLANEOUS
Qty: 200 STRIP | Refills: 3 | Status: SHIPPED | OUTPATIENT
Start: 2019-07-12 | End: 2021-06-28

## 2019-07-12 RX ORDER — LANCETS 33 GAUGE
EACH MISCELLANEOUS
Qty: 200 EACH | Refills: 2 | Status: SHIPPED | OUTPATIENT
Start: 2019-07-12 | End: 2019-10-24

## 2019-07-12 NOTE — PROGRESS NOTES
Name: Deirdre Fernandos  Date: 7/12/2019    Referring Physician: No ref. provider found    HISTORY OF [address] Camryn Kraus is a 27year old female who presents for diabetes mellitus.   She was diagnosed with DM2 one year after deliver Tablet 24 Hr, TAKE 1 TABLET BY MOUTH TWICE A DAY WITH MEALS, Disp: 60 tablet, Rfl: 5  •  METFORMIN HCL  MG Oral Tablet 24 Hr, TAKE 1 TABLET BY MOUTH TWICE A DAY WITH MEALS, Disp: 60 tablet, Rfl: 3  •  Glucose Blood (ACCU-CHEK SMARTVIEW) In Vitro [employer] daily as needed. , Disp: 1 Inhaler, Rfl: 0  •  insulin regular human, conc, (HUMULIN R U-500, CONCENTRATED,) 500 UNIT/ML Subcutaneous Solution, INJECT VIA INSULIN PUMP.  MAXIMUM 150 UNITS DAILY, Disp: 60 mL, Rfl: 0  •  NOVOLOG FLEXPEN 100 UNIT/ML Subcutaneou Wt 291 lb (132 kg)   BMI 48.42 kg/m²     General Appearance:  alert, well developed, in no acute distress  Eyes:  normal conjunctivae, sclera. , normal sclera and normal pupils  Ears/Nose/Mouth/Throat/Neck:  no palpable thyroid nodules or cervical lymphaden

## 2019-07-12 NOTE — PATIENT INSTRUCTIONS
Start Levemir 30 units SQ bedtime    Continue Metformin 2 times per day    Send blood glucose levels in 2 weeks

## 2019-07-18 ENCOUNTER — POSTPARTUM VISIT (OUTPATIENT)
Dept: OBGYN | Age: 34
End: 2019-07-18

## 2019-07-18 VITALS
SYSTOLIC BLOOD PRESSURE: 130 MMHG | DIASTOLIC BLOOD PRESSURE: 90 MMHG | HEIGHT: 65 IN | WEIGHT: 285.7 LBS | BODY MASS INDEX: 47.6 KG/M2

## 2019-07-18 PROBLEM — O09.299 HX OF PREECLAMPSIA, PRIOR PREGNANCY, CURRENTLY PREGNANT: Status: RESOLVED | Noted: 2019-02-08 | Resolved: 2019-07-18

## 2019-07-18 PROBLEM — Z34.83 ENCOUNTER FOR SUPERVISION OF NORMAL PREGNANCY IN MULTIGRAVIDA IN THIRD TRIMESTER: Status: RESOLVED | Noted: 2019-04-26 | Resolved: 2019-07-18

## 2019-07-18 PROBLEM — O99.280 HYPOTHYROID IN PREGNANCY, ANTEPARTUM: Status: RESOLVED | Noted: 2019-02-08 | Resolved: 2019-07-18

## 2019-07-18 PROBLEM — E66.01 MORBIDLY OBESE (CMD): Status: RESOLVED | Noted: 2019-02-08 | Resolved: 2019-07-18

## 2019-07-18 PROBLEM — Z98.891 HISTORY OF CESAREAN SECTION, CLASSICAL: Status: RESOLVED | Noted: 2019-02-08 | Resolved: 2019-07-18

## 2019-07-18 PROBLEM — E03.9 HYPOTHYROID IN PREGNANCY, ANTEPARTUM: Status: RESOLVED | Noted: 2019-02-08 | Resolved: 2019-07-18

## 2019-07-18 PROBLEM — O09.522 ELDERLY MULTIGRAVIDA IN SECOND TRIMESTER: Status: RESOLVED | Noted: 2019-02-08 | Resolved: 2019-07-18

## 2019-07-18 PROCEDURE — 0503F POSTPARTUM CARE VISIT: CPT | Performed by: LEGAL MEDICINE

## 2019-07-18 SDOH — HEALTH STABILITY: MENTAL HEALTH: HOW OFTEN DO YOU HAVE A DRINK CONTAINING ALCOHOL?: NEVER

## 2019-07-18 ASSESSMENT — EDINBURGH POSTNATAL DEPRESSION SCALE (EPDS)
I HAVE BLAMED MYSELF UNNECESSARILY WHEN THINGS WENT WRONG: NOT VERY OFTEN
THINGS HAVE BEEN GETTING ON TOP OF ME: NO, MOST OF THE TIME I HAVE COPED QUITE WELL
I HAVE BEEN ANXIOUS OR WORRIED FOR NO GOOD REASON: YES, SOMETIMES
THE THOUGHT OF HARMING MYSELF HAS OCCURRED TO ME: NEVER
I HAVE BEEN SO UNHAPPY THAT I HAVE HAD DIFFICULTY SLEEPING: NOT VERY OFTEN
I HAVE FELT SAD OR MISERABLE: NOT VERY OFTEN
I HAVE LOOKED FORWARD WITH ENJOYMENT TO THINGS: AS MUCH AS I EVER DID
I HAVE BEEN SO UNHAPPY THAT I HAVE BEEN CRYING: ONLY OCCASIONALLY
I HAVE BEEN ABLE TO LAUGH AND SEE THE FUNNY SIDE OF THINGS: AS MUCH AS I ALWAYS COULD
I HAVE FELT SCARED OR PANICKY FOR NO GOOD REASON: YES, SOMETIMES
TOTAL SCORE: 9

## 2019-08-01 ENCOUNTER — TELEPHONE (OUTPATIENT)
Dept: OBGYN | Age: 34
End: 2019-08-01

## 2019-08-07 ENCOUNTER — PATIENT MESSAGE (OUTPATIENT)
Dept: ENDOCRINOLOGY CLINIC | Facility: CLINIC | Age: 34
End: 2019-08-07

## 2019-08-07 NOTE — TELEPHONE ENCOUNTER
From: Colette Long  To: Walter Guadalupe MD  Sent: 8/7/2019 8:14 AM CDT  Subject: Test Results Question    My sugars have been between 185-220 the past couple weeks.  I was losing weight at first and now my sugars have gone up and I gained a few pound

## 2019-08-08 ENCOUNTER — PATIENT MESSAGE (OUTPATIENT)
Dept: ENDOCRINOLOGY CLINIC | Facility: CLINIC | Age: 34
End: 2019-08-08

## 2019-08-08 NOTE — TELEPHONE ENCOUNTER
From: Taya De Leon  To: Alisa Pastor MD  Sent: 8/8/2019 9:45 AM CDT  Subject: Test Results Question    Jose Peguero, It wouldn't let me reply to the other message for some reason. ..if I don't correct with novolog, what should I do when I get thes

## 2019-08-09 ENCOUNTER — PATIENT MESSAGE (OUTPATIENT)
Dept: ENDOCRINOLOGY CLINIC | Facility: CLINIC | Age: 34
End: 2019-08-09

## 2019-08-09 NOTE — TELEPHONE ENCOUNTER
From: González Pza  To: July Sanchez MD  Sent: 8/9/2019 11:31 AM CDT  Subject: Prescription Question    Hi Dr. Wanda Rodríguez,     The prescription they rec'd was for 10 units not 12 did you want me to change it?  Also my morning was over 200 today do you

## 2019-08-21 RX ORDER — LEVOTHYROXINE SODIUM 175 UG/1
175 TABLET ORAL
Qty: 90 TABLET | Refills: 1 | Status: SHIPPED | OUTPATIENT
Start: 2019-08-21 | End: 2020-02-14

## 2019-08-23 ENCOUNTER — LAB REQUISITION (OUTPATIENT)
Dept: LAB | Facility: HOSPITAL | Age: 34
End: 2019-08-23
Payer: COMMERCIAL

## 2019-08-23 DIAGNOSIS — Z01.89 ENCOUNTER FOR OTHER SPECIFIED SPECIAL EXAMINATIONS: ICD-10-CM

## 2019-08-23 PROCEDURE — 88305 TISSUE EXAM BY PATHOLOGIST: CPT | Performed by: SURGERY

## 2019-08-23 PROCEDURE — 88302 TISSUE EXAM BY PATHOLOGIST: CPT | Performed by: SURGERY

## 2019-08-26 NOTE — PROGRESS NOTES
Name: Jose Robbins  Date: 8/27/2019    Referring Physician: Reji Natalie is a 27year old female who presents for diabetes mellitus follow up / seen postpartum by Dr Talat Arreola 6 weeks ago.   She was diagn salad snacks / cucumbers/ apple snack/ banana  - states eating healthy/ fearful of eating to much due to hyperglycemia   Exercise: No, recent surgery   Polyuria/polydipsia: No  Blurred vision: No    Episodes of hypoglycemia: No  Blood Glucose:  Check 1-2 t Subcutaneous Solution Pen-injector, Inject 36 Units into the skin nightly.  (Patient taking differently: Inject 40 Units into the skin nightly.  ), Disp: 32.4 mL, Rfl: 0  •  ONETOUCH VERIO In Vitro Strip, Check 4 times daily, Disp: 200 strip, Rfl: 3  •  Ins ANAPHYLAXIS  Penicillins             ANAPHYLAXIS    Social History:   Social History    Socioeconomic History      Marital status:       Spouse name: Not on file      Number of children: Not on file      Years of education: Not on file      Highest place  Nutritional:  4# weight  loss    ASSESSMENT/PLAN:      1.  Diabetes Mellitus Type 2, Uncontrolled  -Uncontrolled, HgA1c 8.4% -->increased since last visit  / Target 6.5%     -Reviewed post partum importance of continued regular f/u to control DM meds  Dose is same dose as pregnancy  Recheck thyroid panel post partum    Birth Control/ Pregnancy Counseling.  Reviewed with the patient can still become pregnant while breast feeding and that A1C is elevated that ideally the A1C should be <6.5% to reduce

## 2019-08-27 ENCOUNTER — OFFICE VISIT (OUTPATIENT)
Dept: ENDOCRINOLOGY CLINIC | Facility: CLINIC | Age: 34
End: 2019-08-27
Payer: COMMERCIAL

## 2019-08-27 VITALS
SYSTOLIC BLOOD PRESSURE: 125 MMHG | WEIGHT: 288.19 LBS | HEART RATE: 97 BPM | DIASTOLIC BLOOD PRESSURE: 87 MMHG | BODY MASS INDEX: 48 KG/M2

## 2019-08-27 DIAGNOSIS — E06.3 HYPOTHYROIDISM DUE TO HASHIMOTO'S THYROIDITIS: ICD-10-CM

## 2019-08-27 DIAGNOSIS — E03.8 HYPOTHYROIDISM DUE TO HASHIMOTO'S THYROIDITIS: ICD-10-CM

## 2019-08-27 DIAGNOSIS — E11.65 UNCONTROLLED TYPE 2 DIABETES MELLITUS WITH HYPERGLYCEMIA (HCC): Primary | ICD-10-CM

## 2019-08-27 LAB
CARTRIDGE EXPIRATION DATE: ABNORMAL DATE
CARTRIDGE LOT#: ABNORMAL NUMERIC
GLUCOSE BLOOD: 167
HEMOGLOBIN A1C: 8.4 % (ref 4.3–5.6)
TEST STRIP EXPIRATION DATE: NORMAL DATE
TEST STRIP LOT #: NORMAL NUMERIC

## 2019-08-27 PROCEDURE — 82962 GLUCOSE BLOOD TEST: CPT | Performed by: NURSE PRACTITIONER

## 2019-08-27 PROCEDURE — 99215 OFFICE O/P EST HI 40 MIN: CPT | Performed by: NURSE PRACTITIONER

## 2019-08-27 PROCEDURE — 83036 HEMOGLOBIN GLYCOSYLATED A1C: CPT | Performed by: NURSE PRACTITIONER

## 2019-08-27 PROCEDURE — 36416 COLLJ CAPILLARY BLOOD SPEC: CPT | Performed by: NURSE PRACTITIONER

## 2019-08-27 NOTE — PATIENT INSTRUCTIONS
Medications for DM   Metformin ER 500mg PO BID    Levemir 40 units SQ QHS, verbalized understanding of risks and benefits ----------Increase to 45 units SC at HS     Novolog 14 units with meals 2-3 times--------Increase to Novolog 18 units SC with meals

## 2019-08-30 ENCOUNTER — PATIENT MESSAGE (OUTPATIENT)
Dept: ENDOCRINOLOGY CLINIC | Facility: CLINIC | Age: 34
End: 2019-08-30

## 2019-08-30 NOTE — TELEPHONE ENCOUNTER
From: Amina Vidales  To:  Frank Smith MD  Sent: 8/30/2019 7:20 AM CDT  Subject: Test Results Question    Hi there, Jeffrey Mortensen said to send sugars today and to increase levemir by 5 to 50 if they were still high which they are, two mornings were over

## 2019-08-30 NOTE — TELEPHONE ENCOUNTER
Scripts ordered as written. \"increase Levemir to 60 units SQ daily and increase Novolog to 22 units SQ 3 times daily with meals. \"

## 2019-09-11 ENCOUNTER — HOSPITAL (OUTPATIENT)
Dept: OTHER | Age: 34
End: 2019-09-11
Attending: LEGAL MEDICINE

## 2019-09-20 ENCOUNTER — OFFICE VISIT (OUTPATIENT)
Dept: FAMILY MEDICINE CLINIC | Facility: CLINIC | Age: 34
End: 2019-09-20
Payer: COMMERCIAL

## 2019-09-20 VITALS
BODY MASS INDEX: 47.82 KG/M2 | TEMPERATURE: 98 F | SYSTOLIC BLOOD PRESSURE: 143 MMHG | HEART RATE: 110 BPM | WEIGHT: 287 LBS | HEIGHT: 65 IN | DIASTOLIC BLOOD PRESSURE: 98 MMHG

## 2019-09-20 DIAGNOSIS — H66.001 NON-RECURRENT ACUTE SUPPURATIVE OTITIS MEDIA OF RIGHT EAR WITHOUT SPONTANEOUS RUPTURE OF TYMPANIC MEMBRANE: ICD-10-CM

## 2019-09-20 DIAGNOSIS — J02.9 PHARYNGITIS, UNSPECIFIED ETIOLOGY: ICD-10-CM

## 2019-09-20 DIAGNOSIS — J02.9 SORE THROAT: Primary | ICD-10-CM

## 2019-09-20 LAB
CONTROL LINE PRESENT WITH A CLEAR BACKGROUND (YES/NO): YES YES/NO
KIT LOT #: NORMAL NUMERIC
STREP GRP A CUL-SCR: NEGATIVE

## 2019-09-20 PROCEDURE — 87880 STREP A ASSAY W/OPTIC: CPT | Performed by: NURSE PRACTITIONER

## 2019-09-20 PROCEDURE — 99213 OFFICE O/P EST LOW 20 MIN: CPT | Performed by: NURSE PRACTITIONER

## 2019-09-20 RX ORDER — AZITHROMYCIN 250 MG/1
TABLET, FILM COATED ORAL
Qty: 6 TABLET | Refills: 0 | Status: SHIPPED | OUTPATIENT
Start: 2019-09-20 | End: 2020-02-21 | Stop reason: ALTCHOICE

## 2019-09-20 NOTE — PROGRESS NOTES
HPI    Patient presents for sore throat and chest congestion x 5 days. Right ear pain, as well. Almost went to the ER last night since throat pain was so severe. Review of Systems   HENT: Positive for congestion and sore throat.     All other systems Non-medical: Not on file    Tobacco Use      Smoking status: Former Smoker        Types: Cigarettes      Smokeless tobacco: Never Used    Substance and Sexual Activity      Alcohol use:  Yes        Alcohol/week: 0.0 standard drinks        Comment: occ daily before meals. Disp: 21 mL Rfl: 2   LEVOTHYROXINE SODIUM 175 MCG Oral Tab TAKE 1 TABLET (175 MCG TOTAL) BY MOUTH BEFORE BREAKFAST.  Disp: 90 tablet Rfl: 1   METFORMIN HCL  MG Oral Tablet 24 Hr TAKE 1 TABLET BY MOUTH TWICE A DAY WITH MEALS Disp: 6 vitals reviewed. Constitutional: She is oriented to person, place, and time. She appears well-developed and well-nourished. HENT:   Head: Normocephalic and atraumatic. Right Ear: Ear canal normal. There is tenderness.  Tympanic membrane is erythematou

## 2019-09-23 ENCOUNTER — TELEPHONE (OUTPATIENT)
Dept: FAMILY MEDICINE CLINIC | Facility: CLINIC | Age: 34
End: 2019-09-23

## 2019-09-23 NOTE — TELEPHONE ENCOUNTER
Pt was seen on 9/20/19 by Yenifer Harrison, and states has been taking Z-Kirit since then and reports only mild improvement in throat pain; state pain was 10/10 pain rating when seen and is now 8/10.  States HA and neck pain (\"all around neck but mainly at base

## 2019-09-24 NOTE — TELEPHONE ENCOUNTER
Called patient, asked her how is she doing and she states that she's still not doing good, patient states that she called yesterday and why it's only now that somebody is calling her, offered her appointment this morning but refused since she's already at

## 2019-09-24 NOTE — TELEPHONE ENCOUNTER
If she is already taking Zithromax and ibuprofen and still having discomfort by Tuesday morning then go ahead and work her in with me Tuesday before 1:30 PM if possible. I can even see her sometime before noon as looks like I have many openings.   If need

## 2019-09-26 NOTE — TELEPHONE ENCOUNTER
LMTCB please transfer to triage. Thanks  I also noted she sent us a [employer] message this morning. I sent her a [employer] message for her to call us back.

## 2019-09-27 NOTE — TELEPHONE ENCOUNTER
LMTCB    Patient states in [employer] message from 09/26 that she cannot answer phone during the day. Please follow up with patient on 09/28 to offer follow up appointment.

## 2019-09-28 NOTE — TELEPHONE ENCOUNTER
Patient has viewed in [employer]    RE: Visit Follow-up Question   Message [phone]   From  Chance Hayes RN To  Jaye Escobar Sent and Delivered  9/27/2019  4:43 PM   Last Read in [address]  9/27/2019  5:36 PM by Morris Roe

## 2019-09-30 ENCOUNTER — OFFICE VISIT (OUTPATIENT)
Dept: ENDOCRINOLOGY CLINIC | Facility: CLINIC | Age: 34
End: 2019-09-30
Payer: COMMERCIAL

## 2019-09-30 VITALS
SYSTOLIC BLOOD PRESSURE: 123 MMHG | DIASTOLIC BLOOD PRESSURE: 86 MMHG | HEART RATE: 88 BPM | BODY MASS INDEX: 48 KG/M2 | WEIGHT: 288 LBS

## 2019-09-30 DIAGNOSIS — E11.65 UNCONTROLLED TYPE 2 DIABETES MELLITUS WITH HYPERGLYCEMIA (HCC): Primary | ICD-10-CM

## 2019-09-30 PROCEDURE — 36416 COLLJ CAPILLARY BLOOD SPEC: CPT | Performed by: INTERNAL MEDICINE

## 2019-09-30 PROCEDURE — 82962 GLUCOSE BLOOD TEST: CPT | Performed by: INTERNAL MEDICINE

## 2019-09-30 PROCEDURE — 99214 OFFICE O/P EST MOD 30 MIN: CPT | Performed by: INTERNAL MEDICINE

## 2019-09-30 NOTE — PATIENT INSTRUCTIONS
Increase Levemir to 68 units SQ daily    Novolog  INSULIN SLIDING SCALE  Base Values  Breakfast: 28  Lunch: 28  Dinner: 28  Ranges:  80-99: -2  100-119: 0  120-139: 0  140-159: 0  160-179: 1  180-199: 2  200-219: 3  220-239: 4  240-259: 5  260-279: 6  280-

## 2019-09-30 NOTE — PROGRESS NOTES
Name: Deshawn Officer  Date: 9/30/2019    Referring Physician: No ref. provider found    HISTORY OF [address] Milla Maurer is a 27year old female who presents for diabetes mellitus.   She was diagnosed with DM2 one year after deliver Suspension, 1 spray by Nasal route daily. , Disp: 5 mL, Rfl: 0  •  Benadryl/Maalox/Lidocaine 1:1:1 solution, Take 5-10 mL by mouth TID AC&HS.  Swish and Smallow or Swish and Spit, Disp: 90 mL, Rfl: 0  •  insulin detemir (LEVEMIR FLEXTOUCH) 100 UNIT/ML Subcut Needle (BD PEN NEEDLE MARIA G U/F) 32G X 4 MM Does not apply Misc, Inject once daily, Disp: 100 each, Rfl: 1  •  ONETOUCH DELICA LANCETS 02Y Does not apply Misc, Check 2 times per day, Disp: 200 each, Rfl: 2  •  Glucose Blood (ONETOUCH VERIO) In Vitro Strip, lymphadenopathy  Back: no kyphosis or back tenderness  Respiratory:  clear to auscultation bilaterally  Cardiovascular:  regular rate, rhythm, , no murmurs, S3 or S4  Gastrointestinal:  normal bowel sounds and no palpable masses in abdomen, organomegaly or

## 2019-10-01 ENCOUNTER — TELEPHONE (OUTPATIENT)
Dept: FAMILY MEDICINE CLINIC | Facility: CLINIC | Age: 34
End: 2019-10-01

## 2019-10-01 DIAGNOSIS — J02.9 SORE THROAT: ICD-10-CM

## 2019-10-01 RX ORDER — LIDOCAINE HYDROCHLORIDE 20 MG/ML
5 SOLUTION OROPHARYNGEAL 3 TIMES DAILY
Qty: 90 VIAL | Refills: 0 | Status: SHIPPED | OUTPATIENT
Start: 2019-10-01 | End: 2019-10-04

## 2019-10-01 NOTE — TELEPHONE ENCOUNTER
Order for lidocaine signed and sent to pharmacy. Please inform patient. If her pain is still severe and persisting, she should be reassessed.

## 2019-10-01 NOTE — TELEPHONE ENCOUNTER
Per pharmacy     Benadryl/Maalox/Lidocaine 1:1:1 solution Take 5-10 mL by mouth TID AC&HS. Swish and Smallow or Swish and Spit Disp: 90 mL Rfl: 0     Is not covered by insurance.      Alternatives are:    Xylocaine (lidocaine HCl) 2% Viscous Solution

## 2019-10-01 NOTE — TELEPHONE ENCOUNTER
Veda Davis=see message below, pended medication. Medication record already updated.     Please reply to pool: HENRI Pastor

## 2019-10-02 NOTE — TELEPHONE ENCOUNTER
follow up   Message [phone]   From  Gavi Rivera RN To  Byron Leary Sent and Delivered  10/1/2019 11:50 AM   Last Read in [address]  10/1/2019 11:50 AM by Amina Vidales

## 2019-10-04 ENCOUNTER — APPOINTMENT (OUTPATIENT)
Dept: LAB | Age: 34
End: 2019-10-04
Attending: NURSE PRACTITIONER
Payer: COMMERCIAL

## 2019-10-04 ENCOUNTER — OFFICE VISIT (OUTPATIENT)
Dept: FAMILY MEDICINE CLINIC | Facility: CLINIC | Age: 34
End: 2019-10-04
Payer: COMMERCIAL

## 2019-10-04 VITALS
WEIGHT: 287 LBS | BODY MASS INDEX: 47.82 KG/M2 | HEIGHT: 65 IN | HEART RATE: 104 BPM | DIASTOLIC BLOOD PRESSURE: 88 MMHG | SYSTOLIC BLOOD PRESSURE: 123 MMHG

## 2019-10-04 DIAGNOSIS — J02.9 PHARYNGITIS, UNSPECIFIED ETIOLOGY: ICD-10-CM

## 2019-10-04 DIAGNOSIS — J30.2 SEASONAL ALLERGIC RHINITIS, UNSPECIFIED TRIGGER: ICD-10-CM

## 2019-10-04 DIAGNOSIS — J02.9 SORE THROAT: Primary | ICD-10-CM

## 2019-10-04 PROCEDURE — 87880 STREP A ASSAY W/OPTIC: CPT | Performed by: NURSE PRACTITIONER

## 2019-10-04 PROCEDURE — 36415 COLL VENOUS BLD VENIPUNCTURE: CPT

## 2019-10-04 PROCEDURE — 86308 HETEROPHILE ANTIBODY SCREEN: CPT

## 2019-10-04 PROCEDURE — 99213 OFFICE O/P EST LOW 20 MIN: CPT | Performed by: NURSE PRACTITIONER

## 2019-10-04 RX ORDER — LIDOCAINE HYDROCHLORIDE 20 MG/ML
5 SOLUTION OROPHARYNGEAL 3 TIMES DAILY
Qty: 90 VIAL | Refills: 3 | Status: SHIPPED | OUTPATIENT
Start: 2019-10-04 | End: 2020-02-21

## 2019-10-04 NOTE — PROGRESS NOTES
HPI    Patient presents for sore throat which has been present for 3 weeks. Also with bilateral ear pain, worse on the right. Was seen on 9/20 and diagnosed with right otitis media. Given a z pack. States that she finished but still feels pain.   Got a Inability: Not on file      Transportation needs:        Medical: Not on file        Non-medical: Not on file    Tobacco Use      Smoking status: Former Smoker        Types: Cigarettes      Smokeless tobacco: Never Used    Substance and Sexual Activity Pen-injector Inject 60 Units into the skin nightly. Disp: 18 mL Rfl: 2   Insulin Aspart Pen (NOVOLOG FLEXPEN) 100 UNIT/ML Subcutaneous Solution Pen-injector Inject 22 Units into the skin 3 (three) times daily before meals.  Disp: 21 mL Rfl: 2   LEVOTHYROXIN Take 200 mg by mouth every 6 (six) hours as needed for Pain. Disp:  Rfl:    azithromycin (ZITHROMAX Z-SHIRA) 250 MG Oral Tab Take two tablets by mouth today, then one tablet daily.  Disp: 6 tablet Rfl: 0       Allergies:    Pcn [Bicillin L-A]      ANAPHYLAXIS ASSAY W/OPTIC (Completed)    ENT - INTERNAL      Discussed plan of care with patient and patient is in agreement. All questions answered. Patient to call with questions or concerns. Encouraged to sign up for My Chart if not already registered.

## 2019-10-24 RX ORDER — LANCETS 33 GAUGE
EACH MISCELLANEOUS
Qty: 400 EACH | Refills: 1 | Status: SHIPPED | OUTPATIENT
Start: 2019-10-24

## 2019-11-06 ENCOUNTER — PATIENT MESSAGE (OUTPATIENT)
Dept: ENDOCRINOLOGY CLINIC | Facility: CLINIC | Age: 34
End: 2019-11-06

## 2019-11-06 NOTE — TELEPHONE ENCOUNTER
From: Jose Servin  To: Milena Ryan MD  Sent: 11/6/2019 10:41 AM CST  Subject: Prescription Question    Good morning, can you send an updated novolog prescription to my pharmacy? I think they have the old one. Thank you!

## 2019-11-25 ENCOUNTER — TELEPHONE (OUTPATIENT)
Dept: ENDOCRINOLOGY CLINIC | Facility: CLINIC | Age: 34
End: 2019-11-25

## 2019-11-25 NOTE — TELEPHONE ENCOUNTER
Current Outpatient Medications   Medication Sig Dispense Refill   • insulin detemir (LEVEMIR FLEXTOUCH) 100 UNIT/ML Subcutaneous Solution Pen-injector Inject 70 Units into the skin nightly.  21 mL 2     Refill

## 2019-11-27 RX ORDER — INSULIN ASPART 100 [IU]/ML
28 INJECTION, SOLUTION INTRAVENOUS; SUBCUTANEOUS
Qty: 78 ML | Refills: 1 | Status: SHIPPED | OUTPATIENT
Start: 2019-11-27 | End: 2020-07-16

## 2020-02-06 RX ORDER — METFORMIN HYDROCHLORIDE 500 MG/1
TABLET, EXTENDED RELEASE ORAL
Qty: 180 TABLET | Refills: 1 | Status: SHIPPED | OUTPATIENT
Start: 2020-02-06 | End: 2021-01-18

## 2020-02-06 NOTE — TELEPHONE ENCOUNTER
LOV 09/30/2019 with RTC 3 months    Sent [facility] ms asking patient to schedule follow up

## 2020-02-14 RX ORDER — LEVOTHYROXINE SODIUM 175 UG/1
175 TABLET ORAL
Qty: 90 TABLET | Refills: 1 | Status: SHIPPED | OUTPATIENT
Start: 2020-02-14 | End: 2020-08-14

## 2020-02-21 ENCOUNTER — OFFICE VISIT (OUTPATIENT)
Dept: FAMILY MEDICINE CLINIC | Facility: CLINIC | Age: 35
End: 2020-02-21
Payer: COMMERCIAL

## 2020-02-21 VITALS
HEART RATE: 128 BPM | DIASTOLIC BLOOD PRESSURE: 96 MMHG | HEIGHT: 65 IN | WEIGHT: 293 LBS | SYSTOLIC BLOOD PRESSURE: 147 MMHG | BODY MASS INDEX: 48.82 KG/M2 | TEMPERATURE: 98 F

## 2020-02-21 DIAGNOSIS — J02.9 SORE THROAT: Primary | ICD-10-CM

## 2020-02-21 DIAGNOSIS — H65.93 BILATERAL OTITIS MEDIA WITH EFFUSION: ICD-10-CM

## 2020-02-21 PROCEDURE — 99213 OFFICE O/P EST LOW 20 MIN: CPT | Performed by: NURSE PRACTITIONER

## 2020-02-21 PROCEDURE — 87880 STREP A ASSAY W/OPTIC: CPT | Performed by: NURSE PRACTITIONER

## 2020-02-21 RX ORDER — AZITHROMYCIN 250 MG/1
TABLET, FILM COATED ORAL
Qty: 6 TABLET | Refills: 0 | Status: SHIPPED | OUTPATIENT
Start: 2020-02-21 | End: 2020-03-12 | Stop reason: ALTCHOICE

## 2020-02-21 NOTE — PROGRESS NOTES
HPI    Patient presents for congestion, chest pain, cough, bilateral ear pain and sore throat x 3 days. With intermittent wheezing when active. Kids were sick last week with the same symptoms. Review of Systems   Constitutional: Negative for fever. Worry: Not on file        Inability: Not on file      Transportation needs:        Medical: Not on file        Non-medical: Not on file    Tobacco Use      Smoking status: Former Smoker        Types: Cigarettes      Smokeless tobacco: Never Used    [place] TABLET BY MOUTH TWICE A DAY WITH MEALS 180 tablet 1   • Insulin Aspart Pen 100 UNIT/ML Subcutaneous Solution Pen-injector PLEASE SEE ATTACHED FOR DETAILED DIRECTIONS 90 mL 0   • NOVOLOG FLEXPEN 100 UNIT/ML Subcutaneous Solution Pen-injector Inject 28 Units is oriented to person, place, and time. She appears well-developed and well-nourished. HENT:   Head: Normocephalic and atraumatic. Right Ear: Ear canal normal. Tympanic membrane is erythematous.  No cerumen present  Left Ear: Ear canal normal. Tympanic

## 2020-03-12 ENCOUNTER — OFFICE VISIT (OUTPATIENT)
Dept: FAMILY MEDICINE CLINIC | Facility: CLINIC | Age: 35
End: 2020-03-12
Payer: COMMERCIAL

## 2020-03-12 VITALS
DIASTOLIC BLOOD PRESSURE: 88 MMHG | BODY MASS INDEX: 48.82 KG/M2 | HEART RATE: 121 BPM | WEIGHT: 293 LBS | SYSTOLIC BLOOD PRESSURE: 137 MMHG | HEIGHT: 65 IN | TEMPERATURE: 100 F

## 2020-03-12 DIAGNOSIS — J35.8 TONSILLAR EXUDATE: Primary | ICD-10-CM

## 2020-03-12 PROCEDURE — 99213 OFFICE O/P EST LOW 20 MIN: CPT | Performed by: NURSE PRACTITIONER

## 2020-03-12 RX ORDER — AZITHROMYCIN 250 MG/1
TABLET, FILM COATED ORAL
Qty: 6 TABLET | Refills: 0 | Status: SHIPPED | OUTPATIENT
Start: 2020-03-12 | End: 2021-01-26

## 2020-03-12 NOTE — PROGRESS NOTES
HPI    Patient presents for congestion, bilateral ear pain and sore throat since yesterday. With intermittent fever. Has never seen ent despite referral order for recurrent pharyngitis.       Last seen on 2/21 and diagnosed with bilateral om; was fine aft Social Needs      Financial resource strain: Not on file      Food insecurity:        Worry: Not on file        Inability: Not on file      Transportation needs:        Medical: Not on file        Non-medical: Not on file    Tobacco Use      Smoking statu UNITS INTO THE SKIN NIGHTLY. 21 mL 0   • LEVOTHYROXINE SODIUM 175 MCG Oral Tab TAKE 1 TABLET (175 MCG TOTAL) BY MOUTH BEFORE BREAKFAST.  90 tablet 1   • METFORMIN HCL  MG Oral Tablet 24 Hr TAKE 1 TABLET BY MOUTH TWICE A DAY WITH MEALS 180 tablet 1   • ANAPHYLAXIS    Physical Exam   Nursing note and vitals reviewed. Constitutional: She is oriented to person, place, and time. She appears well-developed and well-nourished. HENT:   Head: Normocephalic and atraumatic.    Right Ear: Tympanic membrane and e

## 2020-03-27 ENCOUNTER — TELEPHONE (OUTPATIENT)
Dept: OTOLARYNGOLOGY | Facility: CLINIC | Age: 35
End: 2020-03-27

## 2020-03-27 ENCOUNTER — OFFICE VISIT (OUTPATIENT)
Dept: OTOLARYNGOLOGY | Facility: CLINIC | Age: 35
End: 2020-03-27
Payer: COMMERCIAL

## 2020-03-27 VITALS
SYSTOLIC BLOOD PRESSURE: 140 MMHG | TEMPERATURE: 98 F | DIASTOLIC BLOOD PRESSURE: 83 MMHG | WEIGHT: 293 LBS | BODY MASS INDEX: 48.82 KG/M2 | HEIGHT: 65 IN

## 2020-03-27 DIAGNOSIS — J03.90 TONSILLITIS: ICD-10-CM

## 2020-03-27 DIAGNOSIS — G47.33 OBSTRUCTIVE SLEEP APNEA SYNDROME: Primary | ICD-10-CM

## 2020-03-27 PROCEDURE — 99243 OFF/OP CNSLTJ NEW/EST LOW 30: CPT | Performed by: OTOLARYNGOLOGY

## 2020-03-27 RX ORDER — CLINDAMYCIN HYDROCHLORIDE 300 MG/1
300 CAPSULE ORAL EVERY 8 HOURS
Qty: 30 CAPSULE | Refills: 0 | Status: SHIPPED | OUTPATIENT
Start: 2020-03-27 | End: 2020-04-06

## 2020-03-27 NOTE — PROGRESS NOTES
Aydee Borjas is a 38year old female. Patient presents with:  Sore Throat: recurrent sore throat    HPI:   She has been experiencing problems with recurrent sore throats for the last 6 months.   She is having pain in her throat but it becomes very to substitute to preferred brand.  200 strip 3   • Glucose Blood (CONTOUR NEXT TEST) In Vitro Strip Check sugars 6 times daily 600 each 5   • LUPILLO MICROLET LANCETS Does not apply Misc Check sugars 6 times daily 600 each 5   • Insulin Pen Needle (BD PEN NEE Constitutional Normal Overall appearance - Normal.   Head/Face Normal Facial features - Normal. Eyebrows - Normal. Skull - Normal.   Oral/Oropharynx Normal Lips - Normal, Tonsils -3+ cryptic tonsils with debris and erythema and somewhat elongated uvula t

## 2020-03-27 NOTE — TELEPHONE ENCOUNTER
PA is needed for code 04.17.88.69.73 and do we have the allowed amount for the procedure please   Reference # : [id number]

## 2020-03-30 ENCOUNTER — TELEPHONE (OUTPATIENT)
Dept: OTOLARYNGOLOGY | Facility: CLINIC | Age: 35
End: 2020-03-30

## 2020-03-30 NOTE — TELEPHONE ENCOUNTER
Fax received from [employer] regarding sleep test prior authorization request sent   Their are requesting more info    Body mass Index  Patient complains  Pt Symptoms with documented evidence  Duration of pt symptoms  Pt current meds  Co-morbid med

## 2020-03-31 NOTE — TELEPHONE ENCOUNTER
Manage care please see note below, our office did not start prior auth, please follow up with prior auth

## 2020-04-11 ENCOUNTER — OFFICE VISIT (OUTPATIENT)
Dept: SLEEP CENTER | Age: 35
End: 2020-04-11
Attending: OTOLARYNGOLOGY
Payer: COMMERCIAL

## 2020-04-11 DIAGNOSIS — G47.33 OBSTRUCTIVE SLEEP APNEA SYNDROME: ICD-10-CM

## 2020-04-11 PROCEDURE — 95810 POLYSOM 6/> YRS 4/> PARAM: CPT

## 2020-04-17 ENCOUNTER — TELEPHONE (OUTPATIENT)
Dept: FAMILY MEDICINE CLINIC | Facility: CLINIC | Age: 35
End: 2020-04-17

## 2020-04-17 ENCOUNTER — TELEPHONE (OUTPATIENT)
Dept: OTOLARYNGOLOGY | Facility: CLINIC | Age: 35
End: 2020-04-17

## 2020-04-17 ENCOUNTER — PATIENT MESSAGE (OUTPATIENT)
Dept: OTOLARYNGOLOGY | Facility: CLINIC | Age: 35
End: 2020-04-17

## 2020-04-17 DIAGNOSIS — G47.33 OBSTRUCTIVE SLEEP APNEA SYNDROME: Primary | ICD-10-CM

## 2020-04-17 PROCEDURE — 99213 OFFICE O/P EST LOW 20 MIN: CPT | Performed by: OTOLARYNGOLOGY

## 2020-04-17 NOTE — TELEPHONE ENCOUNTER
Patient requesting call to discuss some things she went over with Dr Beth Das. States Dr Beth Das wanted her to follow up with you.

## 2020-04-17 NOTE — TELEPHONE ENCOUNTER
Virtual Telephone Check-In    Sury verbally consents to a Virtual/Telephone Check-In visit on 04/17/20.     Patient understands and accepts financial responsibility for any deductible, co-insurance and/or co-pays associated with this servic

## 2020-04-18 NOTE — TELEPHONE ENCOUNTER
Have her see me.   I saw the note from Dr. Nithin Meyers about possible weight loss medications but for something like this we would definitely need a visit so I can listen to her heart and lungs and possibly even order various tests to make sure the weight loss me

## 2020-04-18 NOTE — TELEPHONE ENCOUNTER
Gil Louise before we call pt are you ok for us to book her visit as a Physical appointment? See message below. Pt want it to be a Px appt?

## 2020-04-18 NOTE — TELEPHONE ENCOUNTER
Patient is calling back regarding follow up appointment that was scheduled Monday 04/20/2020. Patient is requesting appointment be changed to a physical appointment instead due to financial issues. Please advise.

## 2020-04-20 ENCOUNTER — TELEPHONE (OUTPATIENT)
Dept: OTOLARYNGOLOGY | Facility: CLINIC | Age: 35
End: 2020-04-20

## 2020-04-20 DIAGNOSIS — G47.33 OSA (OBSTRUCTIVE SLEEP APNEA): Primary | ICD-10-CM

## 2020-04-20 NOTE — TELEPHONE ENCOUNTER
From: Saud Mccormack  To: Mazin Briceno MD  Sent: 4/17/2020 9:30 PM CDT  Subject: Test Results Question    Hi Dr. Adan Briceno,     I'm not seeing the report from the sleep study on MyChart, can you send it to me? Thank you!

## 2020-04-20 NOTE — TELEPHONE ENCOUNTER
Per sleep center Dr. Pina Bolden ordered retritation but order needs to be just cpap titration. Please advise thank you.

## 2020-04-20 NOTE — TELEPHONE ENCOUNTER
It will be really more for sleep apnea and obesity. It will be for a physical as they are not allowing us to do that at this time because of the COVID-19  Pandemic. Please inform her of this.

## 2020-04-21 NOTE — TELEPHONE ENCOUNTER
Pt calling for order for auto pap machine - she cannot get in to sleep center now due to covid 19 - pt states she is unable to sleep - pls call

## 2020-04-21 NOTE — TELEPHONE ENCOUNTER
Please see below. Patient unable to perform CPAP titration due to COVID19 restrictions  OK to order autopap? If so please advise on settings. Thanks.

## 2020-04-24 ENCOUNTER — PATIENT MESSAGE (OUTPATIENT)
Dept: OTOLARYNGOLOGY | Facility: CLINIC | Age: 35
End: 2020-04-24

## 2020-04-24 RX ORDER — AZITHROMYCIN 250 MG/1
TABLET, FILM COATED ORAL
Qty: 1 PACKAGE | Refills: 0 | Status: SHIPPED | OUTPATIENT
Start: 2020-04-24 | End: 2021-01-26

## 2020-04-24 NOTE — TELEPHONE ENCOUNTER
From: Saud Mccormack  To: Mazin Briceno MD  Sent: 4/24/2020 9:15 AM CDT  Subject: Visit Follow-up Question    One more thing, my left ear hurts a little too not sure if they are related but when my throat would get really bad before my ears would

## 2020-04-27 ENCOUNTER — PATIENT MESSAGE (OUTPATIENT)
Dept: ENDOCRINOLOGY CLINIC | Facility: CLINIC | Age: 35
End: 2020-04-27

## 2020-04-27 NOTE — TELEPHONE ENCOUNTER
From: Deidre Kendall  To: Eliza Morris MD  Sent: 4/27/2020 9:34 AM CDT  Subject: Other    Hi Dr Mariel Enriquez the message from your office e visit is fine. Is it on zoom?

## 2020-04-28 NOTE — TELEPHONE ENCOUNTER
The order is already signed and dated:  Electronically Signed By: Jeff Taylor MD    Order Date: Apr 21, 2020 at  1:47 PM     Returned call to Lowry Bamberger to inform her of this.  [facility]

## 2020-04-28 NOTE — TELEPHONE ENCOUNTER
Rachel Crawley from [facility] express needs dr to sign, date, and return order. She states they did receive an order but due to COVID they needed the length of need. She states she cannot set the patient up until they receive the order.  Fax: [phone]

## 2020-05-04 ENCOUNTER — TELEMEDICINE (OUTPATIENT)
Dept: ENDOCRINOLOGY CLINIC | Facility: CLINIC | Age: 35
End: 2020-05-04
Payer: COMMERCIAL

## 2020-05-04 DIAGNOSIS — E11.65 UNCONTROLLED TYPE 2 DIABETES MELLITUS WITH COMPLICATION, WITH LONG-TERM CURRENT USE OF INSULIN (HCC): Primary | ICD-10-CM

## 2020-05-04 DIAGNOSIS — E11.8 UNCONTROLLED TYPE 2 DIABETES MELLITUS WITH COMPLICATION, WITH LONG-TERM CURRENT USE OF INSULIN (HCC): Primary | ICD-10-CM

## 2020-05-04 DIAGNOSIS — Z79.4 UNCONTROLLED TYPE 2 DIABETES MELLITUS WITH COMPLICATION, WITH LONG-TERM CURRENT USE OF INSULIN (HCC): Primary | ICD-10-CM

## 2020-05-04 PROCEDURE — 99214 OFFICE O/P EST MOD 30 MIN: CPT | Performed by: INTERNAL MEDICINE

## 2020-05-04 NOTE — PROGRESS NOTES
Name: Mario Gato  Date: 5/4/2020    Referring Physician: No ref. provider found    HISTORY OF [address] [facility] Lianet Hinojosa is a 38year old female who presents for diabetes mellitus.   She was diagnosed with DM2 one year after delivery min before eating. Medications:     Current Outpatient Medications:   •  azithromycin (ZITHROMAX Z-SHIRA) 250 MG Oral Tab, Take 1 by oral route every day for 5 days. 2 tablets today. , Disp: 1 Package, Rfl: 0  •  azithromycin 250 MG Oral Tab, Take two tab 200 each, Rfl: 1  •  LEVOTHYROXINE SODIUM 175 MCG Oral Tab, TAKE 1 TABLET (175 MCG TOTAL) BY MOUTH BEFORE BREAKFAST., Disp: 30 tablet, Rfl: 0  •  Insulin Pen Needle (BD PEN NEEDLE MARIA G U/F) 32G X 4 MM Does not apply Misc, Inject once daily, Disp: 100 each, Skin:  normal moisture and skin texture  Hematologic:  no excessive bruising  Psychiatric:  oriented to time, self, and place      ASSESSMENT/PLAN:      1.  Diabetes Mellitus Type 2, controlled  -Controlled, HgA1c 8.4% -->increased since last visit   -she above.

## 2020-05-29 ENCOUNTER — TELEPHONE (OUTPATIENT)
Dept: ENDOCRINOLOGY CLINIC | Facility: CLINIC | Age: 35
End: 2020-05-29

## 2020-06-03 ENCOUNTER — TELEPHONE (OUTPATIENT)
Dept: ENDOCRINOLOGY CLINIC | Facility: CLINIC | Age: 35
End: 2020-06-03

## 2020-06-15 ENCOUNTER — TELEPHONE (OUTPATIENT)
Dept: ENDOCRINOLOGY CLINIC | Facility: CLINIC | Age: 35
End: 2020-06-15

## 2020-06-15 NOTE — TELEPHONE ENCOUNTER
PA has been started for:     Phentermine HCl 30 MG Oral Cap, Take 1 capsule (30 mg total) by mouth every morning., Disp: 30 capsule, Rfl: 2    Go.[email or web address]    Key: [id number]

## 2020-06-15 NOTE — TELEPHONE ENCOUNTER
Medication PA Requested: Phentermine 30 mg                                                         CoverMyMeds Used:  Yes  Key: [id number]  Sig: Take one capsule (30 MG) by mouth every morning  DX Code: E66.9, Z68.43                                     CPT co

## 2020-06-22 NOTE — TELEPHONE ENCOUNTER
Key and pharmacy insurance not eligible for use through [address] [place] [address],  [id number] contacted to verify Pharmacy insurance information and coverMYmeds code  Code remained invalid.    [place]- [id number]  [employer]- SHADIA [employer]- [id number]  OS-[id number]  CoverMy

## 2020-07-16 RX ORDER — INSULIN ASPART 100 [IU]/ML
INJECTION, SOLUTION INTRAVENOUS; SUBCUTANEOUS
Qty: 78 ML | Refills: 1 | Status: SHIPPED | OUTPATIENT
Start: 2020-07-16 | End: 2020-12-18

## 2020-08-14 RX ORDER — LEVOTHYROXINE SODIUM 175 UG/1
TABLET ORAL
Qty: 90 TABLET | Refills: 1 | Status: SHIPPED | OUTPATIENT
Start: 2020-08-14 | End: 2021-01-26

## 2020-09-03 ENCOUNTER — PATIENT MESSAGE (OUTPATIENT)
Dept: ENDOCRINOLOGY CLINIC | Facility: CLINIC | Age: 35
End: 2020-09-03

## 2020-09-04 RX ORDER — PHENTERMINE HYDROCHLORIDE 30 MG/1
30 CAPSULE ORAL EVERY MORNING
Qty: 30 CAPSULE | Refills: 1 | Status: SHIPPED | OUTPATIENT
Start: 2020-09-04 | End: 2020-12-02

## 2020-09-04 NOTE — TELEPHONE ENCOUNTER
From: González Paz  To: Laurel Gilbert MD  Sent: 9/3/2020 1:02 PM CDT  Subject: Prescription Question    Hi there! So I'm officially in my next increment goal and lost 41 lbs!!! I am so excited to keep dropping down.  I saw though that my phentermine

## 2020-09-23 ENCOUNTER — TELEPHONE (OUTPATIENT)
Dept: ENDOCRINOLOGY CLINIC | Facility: CLINIC | Age: 35
End: 2020-09-23

## 2020-09-23 NOTE — TELEPHONE ENCOUNTER
Pharmacy requesting a PA for    Current Outpatient Medications:   •  Phentermine HCl 30 MG Oral Cap, Take 1 capsule (30 mg total) by mouth every morning., Disp: 30 capsule, Rfl: 1    KEY: [id number]

## 2020-09-23 NOTE — TELEPHONE ENCOUNTER
Medication PA Requested:  Phentermine 30 mg                                                        CoverMyMeds Used:  Yes  Key: [id number]  Sig:  Take one capsule by mouth every morning  DX Code:                                     CPT code (if applicable):

## 2020-09-24 NOTE — TELEPHONE ENCOUNTER
Medication PA Requested:  Phentermine 30 mg                                                        CoverMyMeds Used:  Yes  Key: [id number]  Sig:  Take one capsule by mouth every morning  DX Code: obesity                               CPT code (if applicable):

## 2020-09-25 NOTE — TELEPHONE ENCOUNTER
Endo staff, PA was denied. Can you please check fax for reasoning? I believe it might be due to patient reached maximum allowed amount for Rx for calendar year based on PA question but I am not positive. Thank you!

## 2020-09-28 NOTE — TELEPHONE ENCOUNTER
Received Fax. Denial reason   \"You do not meet the requirements of your plan. Your plan approved Phentermine criteria covers this drug when you have not received 3 months of therapy with the requested drug within the past year\".     Would you like to appe

## 2020-09-29 NOTE — TELEPHONE ENCOUNTER
Spoke with [address] [place] [address]:    Patient states she was able to  medication for only $10-12, with a coupon her pharmacy provided her. She is very happy on this medication, and states she has lost almost 50 lbs.

## 2020-11-05 ENCOUNTER — LAB ENCOUNTER (OUTPATIENT)
Dept: LAB | Age: 35
End: 2020-11-05
Attending: FAMILY MEDICINE
Payer: COMMERCIAL

## 2020-11-05 ENCOUNTER — OFFICE VISIT (OUTPATIENT)
Dept: FAMILY MEDICINE CLINIC | Facility: CLINIC | Age: 35
End: 2020-11-05
Payer: COMMERCIAL

## 2020-11-05 VITALS
WEIGHT: 266 LBS | BODY MASS INDEX: 44.32 KG/M2 | SYSTOLIC BLOOD PRESSURE: 120 MMHG | HEIGHT: 65 IN | DIASTOLIC BLOOD PRESSURE: 80 MMHG | HEART RATE: 90 BPM

## 2020-11-05 DIAGNOSIS — Z12.31 VISIT FOR SCREENING MAMMOGRAM: ICD-10-CM

## 2020-11-05 DIAGNOSIS — Z80.3 FAMILY HISTORY OF BREAST CANCER: ICD-10-CM

## 2020-11-05 DIAGNOSIS — E11.65 UNCONTROLLED TYPE 2 DIABETES MELLITUS WITH HYPERGLYCEMIA (HCC): ICD-10-CM

## 2020-11-05 DIAGNOSIS — Z00.00 ROUTINE MEDICAL EXAM: ICD-10-CM

## 2020-11-05 DIAGNOSIS — E78.2 MIXED HYPERLIPIDEMIA: Primary | ICD-10-CM

## 2020-11-05 PROCEDURE — 82607 VITAMIN B-12: CPT

## 2020-11-05 PROCEDURE — 99395 PREV VISIT EST AGE 18-39: CPT | Performed by: FAMILY MEDICINE

## 2020-11-05 PROCEDURE — 3008F BODY MASS INDEX DOCD: CPT | Performed by: FAMILY MEDICINE

## 2020-11-05 PROCEDURE — 84443 ASSAY THYROID STIM HORMONE: CPT

## 2020-11-05 PROCEDURE — 82043 UR ALBUMIN QUANTITATIVE: CPT

## 2020-11-05 PROCEDURE — 3074F SYST BP LT 130 MM HG: CPT | Performed by: FAMILY MEDICINE

## 2020-11-05 PROCEDURE — 80053 COMPREHEN METABOLIC PANEL: CPT

## 2020-11-05 PROCEDURE — 83036 HEMOGLOBIN GLYCOSYLATED A1C: CPT

## 2020-11-05 PROCEDURE — 36415 COLL VENOUS BLD VENIPUNCTURE: CPT

## 2020-11-05 PROCEDURE — 84439 ASSAY OF FREE THYROXINE: CPT

## 2020-11-05 PROCEDURE — 82570 ASSAY OF URINE CREATININE: CPT

## 2020-11-05 PROCEDURE — 80061 LIPID PANEL: CPT

## 2020-11-05 PROCEDURE — 90471 IMMUNIZATION ADMIN: CPT | Performed by: FAMILY MEDICINE

## 2020-11-05 PROCEDURE — 90686 IIV4 VACC NO PRSV 0.5 ML IM: CPT | Performed by: FAMILY MEDICINE

## 2020-11-05 PROCEDURE — 99072 ADDL SUPL MATRL&STAF TM PHE: CPT | Performed by: FAMILY MEDICINE

## 2020-11-05 PROCEDURE — 3079F DIAST BP 80-89 MM HG: CPT | Performed by: FAMILY MEDICINE

## 2020-11-05 PROCEDURE — 85025 COMPLETE CBC W/AUTO DIFF WBC: CPT

## 2020-11-05 PROCEDURE — 82306 VITAMIN D 25 HYDROXY: CPT

## 2020-11-05 NOTE — PROGRESS NOTES
HPI:   Flora Saint is a 38year old female who presents for a complete physical exam.    New to me. Has lost significant amount of weight since post partum. Reports will see Dr. Tripp Fraser next month. Reports no longer has a insulin pump.    Son turn 33G Does not apply Misc USE TO CHECK SUGAR 4 TIMES DAILY AS DIRECTED 400 each 1   • ONETOUCH VERIO In Vitro Strip Check 4 times daily 200 strip 3   • Insulin Pen Needle (BD PEN NEEDLE MARIA G U/F) 32G X 4 MM Does not apply Misc Check 2 times per day 200 each GENERAL: feels well otherwise  SKIN: denies any skin lesions  EYES:denies blurred vision or double vision  HEENT: denies nasal congestion, sinus pain or ST  LUNGS: denies shortness of breath with exertion, denies orthopnea  CARDIOVASCULAR: denies chest p (14); Future  - LIPID PANEL; Future  - TSH W REFLEX TO FREE T4; Future  - HEMOGLOBIN A1C; Future  - VITAMIN D, 25-HYDROXY; Future  - VITAMIN B12; Future  - MICROALB/CREAT RATIO, RANDOM URINE; Future    4. Family history of breast cancer      5.  Visit for s

## 2020-11-09 NOTE — PROGRESS NOTES
Jose Georges- The diabetes is not well controlled. I know you will have your appointment with Dr. Wanda Rodríguez next month to discuss but you may want to send her a message with your readings if running high.  Your vitamin D levels are a bit low take extra vitamin D

## 2020-12-02 ENCOUNTER — HOSPITAL ENCOUNTER (OUTPATIENT)
Dept: MAMMOGRAPHY | Age: 35
Discharge: HOME | End: 2020-12-02
Attending: FAMILY MEDICINE
Payer: COMMERCIAL

## 2020-12-02 DIAGNOSIS — Z12.31 VISIT FOR SCREENING MAMMOGRAM: ICD-10-CM

## 2020-12-02 PROCEDURE — 77067 SCR MAMMO BI INCL CAD: CPT | Performed by: FAMILY MEDICINE

## 2020-12-02 PROCEDURE — 77063 BREAST TOMOSYNTHESIS BI: CPT | Performed by: FAMILY MEDICINE

## 2020-12-02 NOTE — TELEPHONE ENCOUNTER
LOV: 05/04/20  LR: 09/04/20    Future Appointments   Date Time Provider Christa Teresa   12/18/2020  9:45 AM Luna Carrington MD [facility]

## 2020-12-03 RX ORDER — PHENTERMINE HYDROCHLORIDE 30 MG/1
30 CAPSULE ORAL DAILY
Qty: 30 CAPSULE | Refills: 0 | Status: SHIPPED | OUTPATIENT
Start: 2020-12-03 | End: 2020-12-18

## 2020-12-07 ENCOUNTER — TELEPHONE (OUTPATIENT)
Dept: ENDOCRINOLOGY CLINIC | Facility: CLINIC | Age: 35
End: 2020-12-07

## 2020-12-07 NOTE — TELEPHONE ENCOUNTER
Pt was called to convert 12/18 appt to virtual appt. Pt wants to know if she needs to do A1C again since she just got tested with Dr Ce Vinson. She sent over results from tests. Please advise if she should redo bloodwork before appt on 12/18.

## 2020-12-08 NOTE — TELEPHONE ENCOUNTER
Patient had CMP, lipid panel, TSH, FT4, urine MA, vitamin D, and A1C on 11/5/2020. Please notify patient that no additional labs needed at this time. Thank you!

## 2020-12-08 NOTE — TELEPHONE ENCOUNTER
LM for patient stating no additional labs needed for vv on 12/18/20. Advised patient to call with questions.

## 2020-12-08 NOTE — TELEPHONE ENCOUNTER
Dr. Gia Melgoza,  Patient had A1C with Dr. Sabas Clements   Ref Range & Units 11/5/20 12:21 PM   HgbA1C   <5.7 % 8.9High       Patient asking if labs will be needed for VV on 12/18/20    Please advise-thanks

## 2020-12-16 ENCOUNTER — PATIENT MESSAGE (OUTPATIENT)
Dept: ENDOCRINOLOGY CLINIC | Facility: CLINIC | Age: 35
End: 2020-12-16

## 2020-12-17 NOTE — TELEPHONE ENCOUNTER
From: Mario Hoskins  To: Ingrid Corbett MD  Sent: 12/16/2020 6:27 AM CST  Subject: Other    Hi Dr Liat Roque,    I just wanted to clarify, I received a call last week changing my appt to a virtual visit at 10am but now it's showing an in office visit at 9

## 2020-12-18 ENCOUNTER — TELEMEDICINE (OUTPATIENT)
Dept: ENDOCRINOLOGY CLINIC | Facility: CLINIC | Age: 35
End: 2020-12-18
Payer: COMMERCIAL

## 2020-12-18 DIAGNOSIS — Z79.4 UNCONTROLLED TYPE 2 DIABETES MELLITUS WITH COMPLICATION, WITH LONG-TERM CURRENT USE OF INSULIN (HCC): Primary | ICD-10-CM

## 2020-12-18 DIAGNOSIS — E11.8 UNCONTROLLED TYPE 2 DIABETES MELLITUS WITH COMPLICATION, WITH LONG-TERM CURRENT USE OF INSULIN (HCC): Primary | ICD-10-CM

## 2020-12-18 DIAGNOSIS — E11.65 UNCONTROLLED TYPE 2 DIABETES MELLITUS WITH COMPLICATION, WITH LONG-TERM CURRENT USE OF INSULIN (HCC): Primary | ICD-10-CM

## 2020-12-18 PROCEDURE — 99214 OFFICE O/P EST MOD 30 MIN: CPT | Performed by: INTERNAL MEDICINE

## 2020-12-18 RX ORDER — INSULIN DEGLUDEC 200 U/ML
72 INJECTION, SOLUTION SUBCUTANEOUS DAILY
Qty: 45 ML | Refills: 1 | Status: SHIPPED | OUTPATIENT
Start: 2020-12-18

## 2020-12-18 RX ORDER — PHENTERMINE HYDROCHLORIDE 15 MG/1
15 CAPSULE ORAL EVERY MORNING
Qty: 30 CAPSULE | Refills: 2 | Status: SHIPPED | OUTPATIENT
Start: 2020-12-18 | End: 2021-05-14

## 2020-12-18 RX ORDER — SEMAGLUTIDE 1.34 MG/ML
0.5 INJECTION, SOLUTION SUBCUTANEOUS WEEKLY
Qty: 1.5 ML | Refills: 3 | Status: SHIPPED | OUTPATIENT
Start: 2020-12-18 | End: 2021-04-28

## 2020-12-18 NOTE — PROGRESS NOTES
Telehealth outside of [address] Verbal Consent   I conducted a telehealth visit with Mayra Treviño today, 12/18/20, which was completed using two-way, real-time interactive audio and video communication.  This has been done in good will t boy Gilma Moles (currently 10 weeks old). She also has toddler boy, Nick Watkins (2) and Kelly and Lindsey (5) oldest girl. After delivery Vinny did need to be in NICU for several days due to respiratory distress; baby was 36 1/2 weeks. She is now breast feeding since delivery. daily., Disp: 90 tablet, Rfl: 4  •  Levothyroxine Sodium 175 MCG Oral Tab, Take one tablet by mouth daily  (Patient not taking: Reported on 11/5/2020), Disp: 90 tablet, Rfl: 1  •  NOVOLOG FLEXPEN 100 UNIT/ML Subcutaneous Solution Pen-injector, INJECT 28 UN U/F) 32G X 4 MM Does not apply Misc, Inject 2 times per day, Disp: 200 each, Rfl: 1  •  LEVOTHYROXINE SODIUM 175 MCG Oral Tab, TAKE 1 TABLET (175 MCG TOTAL) BY MOUTH BEFORE BREAKFAST., Disp: 30 tablet, Rfl: 0  •  Insulin Pen Needle (BD PEN NEEDLE MARIA G U/F) kyphosis  Respiratory:  non-labored. no increased work of breathing. Skin:  normal moisture and skin texture  Hematologic:  no excessive bruising  Psychiatric:  oriented to time, self, and place      ASSESSMENT/PLAN:      1.  Diabetes Mellitus Type 2, co and decision making. Appropriate medical decision-making and tests are ordered as detailed in the plan of care above.

## 2021-01-12 LAB — AMB EXT COVID-19 RESULT: DETECTED

## 2021-01-13 ENCOUNTER — TELEPHONE (OUTPATIENT)
Dept: FAMILY MEDICINE CLINIC | Facility: CLINIC | Age: 36
End: 2021-01-13

## 2021-01-14 ENCOUNTER — TELEMEDICINE (OUTPATIENT)
Dept: FAMILY MEDICINE CLINIC | Facility: CLINIC | Age: 36
End: 2021-01-14
Payer: COMMERCIAL

## 2021-01-14 ENCOUNTER — TELEPHONE (OUTPATIENT)
Dept: FAMILY MEDICINE CLINIC | Facility: CLINIC | Age: 36
End: 2021-01-14

## 2021-01-14 DIAGNOSIS — U07.1 COVID-19 VIRUS INFECTION: Primary | ICD-10-CM

## 2021-01-14 DIAGNOSIS — R06.00 DYSPNEA ON EXERTION: ICD-10-CM

## 2021-01-14 DIAGNOSIS — R11.2 NON-INTRACTABLE VOMITING WITH NAUSEA, UNSPECIFIED VOMITING TYPE: ICD-10-CM

## 2021-01-14 DIAGNOSIS — R09.81 NASAL CONGESTION: ICD-10-CM

## 2021-01-14 PROCEDURE — 99213 OFFICE O/P EST LOW 20 MIN: CPT | Performed by: PHYSICIAN ASSISTANT

## 2021-01-14 RX ORDER — ALBUTEROL SULFATE 90 UG/1
2 AEROSOL, METERED RESPIRATORY (INHALATION) EVERY 4 HOURS PRN
Qty: 18 G | Refills: 5 | Status: SHIPPED | OUTPATIENT
Start: 2021-01-14

## 2021-01-14 RX ORDER — ONDANSETRON 4 MG/1
4 TABLET, FILM COATED ORAL EVERY 8 HOURS PRN
Qty: 20 TABLET | Refills: 0 | Status: SHIPPED | OUTPATIENT
Start: 2021-01-14 | End: 2021-01-26

## 2021-01-14 RX ORDER — FLUTICASONE PROPIONATE 50 MCG
2 SPRAY, SUSPENSION (ML) NASAL DAILY
Qty: 1 BOTTLE | Refills: 0 | Status: SHIPPED | OUTPATIENT
Start: 2021-01-14 | End: 2021-01-26

## 2021-01-14 NOTE — TELEPHONE ENCOUNTER
Spoke with patient , verified name and , patient put this nurse on hold while she picked dup other call, was placed on hold for 3 minutes, this nurse hung up. Will try again later.       TRIAGE team will do home monitoring   PLEASE DO NOT CLOSE THIS

## 2021-01-14 NOTE — TELEPHONE ENCOUNTER
What  was your temp today? 98.6    How did you take your temp?     oral    Are you feeling short of breath today? Move around -yes- chest hurting when she moves around. ,o2 sat 93-97%,hx asthma long time ago, not on inhaler.     Is the shortness of breat

## 2021-01-14 NOTE — PROGRESS NOTES
HPI: HPI  Telehealth outside of [address] Verbal Consent   I conducted a telehealth visit with Pauljairoruthy Escoto today, 01/14/21, which was completed using two-way, real-time interactive audio and video communication.  This has been done i exertion, fatigue. Patient denies of fever, chest pain, diarrhea, loss of taste or smell.   Medications:     Current Outpatient Medications   Medication Sig Dispense Refill   • Albuterol Sulfate  (90 Base) MCG/ACT Inhalation Aero Soln Inhale 2 puffs Insulin Pen Needle (BD PEN NEEDLE MARIA G U/F) 32G X 4 MM Does not apply Misc Check 2 times per day 200 each 5   • METFORMIN HCL  MG Oral Tablet 24 Hr TAKE 1 TABLET BY MOUTH TWICE A DAY WITH MEALS 60 tablet 3   • Glucose Blood (ACCU-CHEK SMARTVIEW) In V Procedure Laterality Date   •      • Knee surgery Right     \"cartilidge removed from R knee\"       Family History:     Family History   Problem Relation Age of Onset   • Hypertension Other         maternal family hx   • Heart Disease Other 1 cup/day        Occupational Exposure: Not Asked        Hobby Hazards: Not Asked        Sleep Concern: Not Asked        Stress Concern: Not Asked        Weight Concern: Not Asked        Special Diet: Not Asked        Back Care: Not Asked        Exercise: MCG/ACT Nasal Suspension    Non-intractable vomiting with nausea, unspecified vomiting type        Relevant Medications    Ondansetron HCl (ZOFRAN) 4 mg tablet        Discussed plan of care with pt and pt is in agreement. All questions answered.  Pt to call

## 2021-01-14 NOTE — TELEPHONE ENCOUNTER
RN=please clarify test date and update infection banner, this nurse also sent MyChart message in regards of her family issue for the test.    TRIAGE team will do home monitoring   PLEASE DO NOT [address].      ----- Message from Anna Arndt

## 2021-01-14 NOTE — TELEPHONE ENCOUNTER
----- Message from Anne Song sent at 1/13/2021 11:01 PM CST -----  Regarding: RE: Test Results Question  Contact: [phone]  We haven't gone anywhere except my  and children last Tuesday 1/5 went to help his mom and then that Friday tight and that's the part that scares me the most not sure if it's because of my asthma or what. I'm pretty sure my  and three kids all have it as well although I seem to have the worst of it. Is there some medicine I can take?  I'm so scared I don't

## 2021-01-16 NOTE — TELEPHONE ENCOUNTER
Triage team will monitor patient . Please DO NOT close this encounter.      Left message to call back

## 2021-01-18 ENCOUNTER — HOSPITAL ENCOUNTER (OUTPATIENT)
Dept: GENERAL RADIOLOGY | Age: 36
Discharge: HOME | End: 2021-01-18
Attending: NURSE PRACTITIONER
Payer: COMMERCIAL

## 2021-01-18 ENCOUNTER — TELEPHONE (OUTPATIENT)
Dept: FAMILY MEDICINE CLINIC | Facility: CLINIC | Age: 36
End: 2021-01-18

## 2021-01-18 DIAGNOSIS — J02.9 SORE THROAT: ICD-10-CM

## 2021-01-18 PROCEDURE — 71046 X-RAY EXAM CHEST 2 VIEWS: CPT | Performed by: NURSE PRACTITIONER

## 2021-01-18 RX ORDER — BENZONATATE 200 MG/1
200 CAPSULE ORAL 3 TIMES DAILY PRN
Qty: 30 CAPSULE | Refills: 0 | Status: SHIPPED | OUTPATIENT
Start: 2021-01-18 | End: 2021-01-26

## 2021-01-18 NOTE — TELEPHONE ENCOUNTER
Patient had virtual visit 1/14 for Covid. Patient reports had chest x-ray done, has reviewed results via [employer] and its noted she has pneumonia, patient requesting if medication is needed, please advise.  Reports ongoing chest pain since last week but no d

## 2021-01-18 NOTE — TELEPHONE ENCOUNTER
Called and discussed with patient regarding chest x-ray result which shows possible viral pneumonia. Advise patient to take Tessalon 200 mg PO TID as needed for cough and inhaler as needed for SOB/wheezing. Call office if worsen.  Patient verbalized underst

## 2021-01-18 NOTE — TELEPHONE ENCOUNTER
Left message to call back on patient's voicemail. Destini Darling          10:51 AM  Hi there, just tried to call back they said you were on a call.

## 2021-01-18 NOTE — TELEPHONE ENCOUNTER
Triage team will monitor patient . Please DO NOT close this encounter    Left message to call back on patient's voicemail.     [facility] message sent

## 2021-01-18 NOTE — TELEPHONE ENCOUNTER
What  was your temp today? - not checked, denied having fevers. How did you take your temp? N/A    Are you feeling short of breath today? No, yesterday felt winded. Is the shortness of breath better, the same, or worse than yesterday?   The same, fee

## 2021-01-19 RX ORDER — METFORMIN HYDROCHLORIDE 500 MG/1
TABLET, EXTENDED RELEASE ORAL
Qty: 180 TABLET | Refills: 0 | Status: SHIPPED | OUTPATIENT
Start: 2021-01-19 | End: 2021-04-28

## 2021-01-20 NOTE — TELEPHONE ENCOUNTER
See acute telephone encounter regarding the xray result with recommendation from her provider. What  was your temp today?have not have any fever    How did you take your temp? Did not check today     Are you feeling short of breath today?   No-little

## 2021-01-26 ENCOUNTER — OFFICE VISIT (OUTPATIENT)
Dept: FAMILY MEDICINE CLINIC | Facility: CLINIC | Age: 36
End: 2021-01-26
Payer: COMMERCIAL

## 2021-01-26 ENCOUNTER — HOSPITAL ENCOUNTER (OUTPATIENT)
Dept: ULTRASOUND IMAGING | Facility: HOSPITAL | Age: 36
Discharge: HOME | End: 2021-01-26
Attending: FAMILY MEDICINE
Payer: COMMERCIAL

## 2021-01-26 ENCOUNTER — NURSE TRIAGE (OUTPATIENT)
Dept: FAMILY MEDICINE CLINIC | Facility: CLINIC | Age: 36
End: 2021-01-26

## 2021-01-26 VITALS
WEIGHT: 275.63 LBS | DIASTOLIC BLOOD PRESSURE: 87 MMHG | TEMPERATURE: 97 F | SYSTOLIC BLOOD PRESSURE: 137 MMHG | HEART RATE: 94 BPM | HEIGHT: 65 IN | BODY MASS INDEX: 45.92 KG/M2

## 2021-01-26 DIAGNOSIS — Z86.16 HISTORY OF COVID-19: ICD-10-CM

## 2021-01-26 DIAGNOSIS — M79.661 BILATERAL CALF PAIN: ICD-10-CM

## 2021-01-26 DIAGNOSIS — M79.662 BILATERAL CALF PAIN: Primary | ICD-10-CM

## 2021-01-26 DIAGNOSIS — M79.662 BILATERAL CALF PAIN: ICD-10-CM

## 2021-01-26 DIAGNOSIS — M79.661 BILATERAL CALF PAIN: Primary | ICD-10-CM

## 2021-01-26 PROCEDURE — 99214 OFFICE O/P EST MOD 30 MIN: CPT | Performed by: FAMILY MEDICINE

## 2021-01-26 PROCEDURE — 3079F DIAST BP 80-89 MM HG: CPT | Performed by: FAMILY MEDICINE

## 2021-01-26 PROCEDURE — 3008F BODY MASS INDEX DOCD: CPT | Performed by: FAMILY MEDICINE

## 2021-01-26 PROCEDURE — 93970 EXTREMITY STUDY: CPT | Performed by: FAMILY MEDICINE

## 2021-01-26 PROCEDURE — 3075F SYST BP GE 130 - 139MM HG: CPT | Performed by: FAMILY MEDICINE

## 2021-01-26 NOTE — PROGRESS NOTES
Alfonso Garzon is a 38year old female. Patient presents with:  Leg Pain: shooting pain, bilateral calf,  x 1 day    HPI:   Reports having pain in her calves - since yesterday. Reports stabbing pain in her calves.  No trauma - not walking for prolong LEVOTHYROXINE SODIUM 175 MCG Oral Tab, TAKE 1 TABLET (175 MCG TOTAL) BY MOUTH BEFORE BREAKFAST., Disp: 30 tablet, Rfl: 0    •  Insulin Pen Needle (BD PEN NEEDLE MARIA G U/F) 32G X 4 MM Does not apply Misc, Inject once daily, Disp: 100 each, Rfl: 1    •  ONET Reviewed Ultrasound results - negative for DVT. Discussed massage, cold compresses vs heat to help with pain. - US VENOUS DOPPLER LEG BILAT - DIAG IMG (CPT=93970); Future    2.  History of COVID-19    - US VENOUS DOPPLER LEG BILAT - DIAG IMG (CPT=93970)

## 2021-01-26 NOTE — TELEPHONE ENCOUNTER
Action Requested: Summary for Provider     []  Critical Lab, Recommendations Needed  [] Need Additional Advice  []   FYI    []   Need Orders  [] Need Medications Sent to Pharmacy  [x]  Other     SUMMARY:  Verified name and .   Patient reports that over t

## 2021-02-05 DIAGNOSIS — R09.81 NASAL CONGESTION: ICD-10-CM

## 2021-02-05 RX ORDER — FLUTICASONE PROPIONATE 50 MCG
2 SPRAY, SUSPENSION (ML) NASAL DAILY
Qty: 3 BOTTLE | Refills: 1 | Status: SHIPPED | OUTPATIENT
Start: 2021-02-05 | End: 2021-03-07

## 2021-03-25 RX ORDER — LEVOTHYROXINE SODIUM 175 UG/1
TABLET ORAL
Qty: 90 TABLET | Refills: 0 | Status: SHIPPED | OUTPATIENT
Start: 2021-03-25 | End: 2021-07-09

## 2021-04-28 RX ORDER — SEMAGLUTIDE 1.34 MG/ML
0.5 INJECTION, SOLUTION SUBCUTANEOUS WEEKLY
Qty: 1.5 ML | Refills: 3 | Status: SHIPPED | OUTPATIENT
Start: 2021-04-28 | End: 2021-05-14

## 2021-04-28 RX ORDER — METFORMIN HYDROCHLORIDE 500 MG/1
TABLET, EXTENDED RELEASE ORAL
Qty: 180 TABLET | Refills: 0 | Status: SHIPPED | OUTPATIENT
Start: 2021-04-28

## 2021-05-12 ENCOUNTER — PATIENT MESSAGE (OUTPATIENT)
Dept: ENDOCRINOLOGY CLINIC | Facility: CLINIC | Age: 36
End: 2021-05-12

## 2021-05-12 NOTE — TELEPHONE ENCOUNTER
From: Viridiana Steel  To: Prateek Manning MD  Sent: 5/12/2021 12:45 PM CDT  Subject: Visit Follow-up Question    Hi there, the nurse had called to ujobtw the appt to earlier at 10:15, I am going to get bloodwork at 8:45 down the street in [facility]

## 2021-05-14 ENCOUNTER — TELEPHONE (OUTPATIENT)
Dept: ENDOCRINOLOGY CLINIC | Facility: CLINIC | Age: 36
End: 2021-05-14

## 2021-05-14 ENCOUNTER — LAB ENCOUNTER (OUTPATIENT)
Dept: LAB | Facility: HOSPITAL | Age: 36
End: 2021-05-14
Attending: INTERNAL MEDICINE
Payer: COMMERCIAL

## 2021-05-14 ENCOUNTER — OFFICE VISIT (OUTPATIENT)
Dept: ENDOCRINOLOGY CLINIC | Facility: CLINIC | Age: 36
End: 2021-05-14
Payer: COMMERCIAL

## 2021-05-14 VITALS
SYSTOLIC BLOOD PRESSURE: 119 MMHG | BODY MASS INDEX: 46 KG/M2 | DIASTOLIC BLOOD PRESSURE: 84 MMHG | HEART RATE: 94 BPM | WEIGHT: 276 LBS

## 2021-05-14 DIAGNOSIS — Z79.4 UNCONTROLLED TYPE 2 DIABETES MELLITUS WITH COMPLICATION, WITH LONG-TERM CURRENT USE OF INSULIN (HCC): ICD-10-CM

## 2021-05-14 DIAGNOSIS — E03.8 HYPOTHYROIDISM DUE TO HASHIMOTO'S THYROIDITIS: Primary | ICD-10-CM

## 2021-05-14 DIAGNOSIS — E11.8 UNCONTROLLED TYPE 2 DIABETES MELLITUS WITH COMPLICATION, WITH LONG-TERM CURRENT USE OF INSULIN (HCC): ICD-10-CM

## 2021-05-14 DIAGNOSIS — Z79.4 UNCONTROLLED TYPE 2 DIABETES MELLITUS WITH COMPLICATION, WITH LONG-TERM CURRENT USE OF INSULIN (HCC): Primary | ICD-10-CM

## 2021-05-14 DIAGNOSIS — E03.8 HYPOTHYROIDISM DUE TO HASHIMOTO'S THYROIDITIS: ICD-10-CM

## 2021-05-14 DIAGNOSIS — E11.65 UNCONTROLLED TYPE 2 DIABETES MELLITUS WITH COMPLICATION, WITH LONG-TERM CURRENT USE OF INSULIN (HCC): ICD-10-CM

## 2021-05-14 DIAGNOSIS — E06.3 HYPOTHYROIDISM DUE TO HASHIMOTO'S THYROIDITIS: ICD-10-CM

## 2021-05-14 DIAGNOSIS — E11.8 UNCONTROLLED TYPE 2 DIABETES MELLITUS WITH COMPLICATION, WITH LONG-TERM CURRENT USE OF INSULIN (HCC): Primary | ICD-10-CM

## 2021-05-14 DIAGNOSIS — E06.3 HYPOTHYROIDISM DUE TO HASHIMOTO'S THYROIDITIS: Primary | ICD-10-CM

## 2021-05-14 DIAGNOSIS — E11.65 UNCONTROLLED TYPE 2 DIABETES MELLITUS WITH COMPLICATION, WITH LONG-TERM CURRENT USE OF INSULIN (HCC): Primary | ICD-10-CM

## 2021-05-14 PROCEDURE — 36415 COLL VENOUS BLD VENIPUNCTURE: CPT

## 2021-05-14 PROCEDURE — 3074F SYST BP LT 130 MM HG: CPT | Performed by: INTERNAL MEDICINE

## 2021-05-14 PROCEDURE — 99214 OFFICE O/P EST MOD 30 MIN: CPT | Performed by: INTERNAL MEDICINE

## 2021-05-14 PROCEDURE — 82947 ASSAY GLUCOSE BLOOD QUANT: CPT | Performed by: INTERNAL MEDICINE

## 2021-05-14 PROCEDURE — 84443 ASSAY THYROID STIM HORMONE: CPT

## 2021-05-14 PROCEDURE — 84439 ASSAY OF FREE THYROXINE: CPT

## 2021-05-14 PROCEDURE — 3052F HG A1C>EQUAL 8.0%<EQUAL 9.0%: CPT | Performed by: INTERNAL MEDICINE

## 2021-05-14 PROCEDURE — 3079F DIAST BP 80-89 MM HG: CPT | Performed by: INTERNAL MEDICINE

## 2021-05-14 PROCEDURE — 36416 COLLJ CAPILLARY BLOOD SPEC: CPT | Performed by: INTERNAL MEDICINE

## 2021-05-14 PROCEDURE — 83036 HEMOGLOBIN GLYCOSYLATED A1C: CPT | Performed by: INTERNAL MEDICINE

## 2021-05-14 RX ORDER — INSULIN ASPART 100 [IU]/ML
INJECTION, SOLUTION INTRAVENOUS; SUBCUTANEOUS
Qty: 45 ML | Refills: 1 | Status: SHIPPED | OUTPATIENT
Start: 2021-05-14 | End: 2021-07-08

## 2021-05-14 RX ORDER — LEVOTHYROXINE SODIUM 0.2 MG/1
200 TABLET ORAL
Qty: 90 TABLET | Refills: 1 | Status: SHIPPED | OUTPATIENT
Start: 2021-05-14 | End: 2021-08-06

## 2021-05-14 NOTE — PATIENT INSTRUCTIONS
INCREASE Tresiba to 76 units subcutaneous daily    Novolog  INSULIN SLIDING SCALE  Base Values  Breakfast: 12  Lunch: 12  Dinner: 12  Ranges:  80-99: -2  100-119: 0  120-139: 0  140-159: 1  160-179: 1  180-199: 2  200-219: 2  220-239: 3  240-259: 3  260-27

## 2021-05-14 NOTE — TELEPHONE ENCOUNTER
Please call patient - thyroid levels are not at goal.  Increase Levothyroxine to 200mcg PO daily #90, refill 1. Recheck TSH, FT4 in one month. Thanks.

## 2021-05-14 NOTE — TELEPHONE ENCOUNTER
Medication PA Requested: Levothyroxine Sodium 200 MCG oral tab Quantity: 90 rfl: 1                                                         CoverMyMeds Used:  Key:   Sig: Take 1 tablet (200 mcg total) by mouth before breakfast  DX Code: E03.8

## 2021-05-14 NOTE — TELEPHONE ENCOUNTER
Spoke to patient and relayed Dr. Joao Zarate message below. Patient will increase Levothyroxine dose and get labs rechecked in one month.     PA sent for medication

## 2021-05-14 NOTE — PROGRESS NOTES
Name: Raghavendra Pa  Date: 5/14/2021    Referring Physician: No ref. provider found    HISTORY OF [address] Nellie Correa is a 39year old female who presents for diabetes mellitus.       She has now delivered 3rd baby -  Baby boy Pranav Call daily, taking medication in AM and waiting 30-60 min before eating.      Medications:     Current Outpatient Medications:   •  Prenatal Vit-Fe Fumarate-FA (PRENATAL VITAMIN OR), Take by mouth., Disp: , Rfl:   •  METFORMIN HCL  MG Oral Tablet 24 Hr, TA or 0.5MG/DOS, (OZEMPIC, 0.25 OR 0.5 MG/DOSE,) 2 MG/1.5ML Subcutaneous Solution Pen-injector, Inject 0.5 mg into the skin once a week.  (Patient not taking: Reported on 5/14/2021 ), Disp: 1.5 mL, Rfl: 3  •  Phentermine HCl 15 MG Oral Cap, Take 1 capsule (15 Skin:  normal moisture and skin texture  Hematologic:  no excessive bruising  Psychiatric:  oriented to time, self, and place      ASSESSMENT/PLAN:      1.  Diabetes Mellitus Type 2, Uncontrolled  -UnControlled, HgA1c 8.5% -->mild improvement   -Discussed

## 2021-05-17 NOTE — TELEPHONE ENCOUNTER
.Medication PA Requested: Levothyroxine Sodium 200 MCG oral tab Quantity: 90 rfl:1    CoverMyMeds Used:  Key:   Sig: Take 1 tablet (200 mcg total) by mouth before breakfast  DX Code: E03.8           I phoned [facility], [phone], spoke with Curtis/pharmacist.

## 2021-05-21 ENCOUNTER — PATIENT MESSAGE (OUTPATIENT)
Dept: ENDOCRINOLOGY CLINIC | Facility: CLINIC | Age: 36
End: 2021-05-21

## 2021-05-21 NOTE — TELEPHONE ENCOUNTER
From: Lisa Gonzalez  To: Demario Oconnor MD  Sent: 5/21/2021 7:44 AM CDT  Subject: Test Results Question    Good morning,    Here's my sugars update, let me know if I need to make any changes.  I did reach out to [address] [place] [address] Street called me I texted he

## 2021-05-21 NOTE — TELEPHONE ENCOUNTER
Patient is currently pregnant. Has type 2 diabetes with insulin use. Per LOV note plan was to restart insulin pump if US confirmed pregnancy. Seems pregnancy is confirmed.    From the names she is providing I am assuming she is trying to restart a med

## 2021-05-21 NOTE — TELEPHONE ENCOUNTER
Received call from [facility] with [employer]. She is currently working on this patient's case. She states her 630G pump is still under warranty until 2022.    She is seeing what she can do to get her a new pump or upgraded pump, but there may be a fee if she d

## 2021-05-21 NOTE — TELEPHONE ENCOUNTER
Received communication from Debi with [employer]. She states patient will need an off label prescription to start her upgrade process. To fax prescription to [phone]. The prescription must include diagnosis code.      Example:    I am prescribing mnime

## 2021-05-24 NOTE — TELEPHONE ENCOUNTER
Generated letter with information below. [facility]

## 2021-05-27 ENCOUNTER — PATIENT MESSAGE (OUTPATIENT)
Dept: ENDOCRINOLOGY CLINIC | Facility: CLINIC | Age: 36
End: 2021-05-27

## 2021-05-27 RX ORDER — SUBCUTANEOUS INSULIN PUMP
1 EACH MISCELLANEOUS ONCE
Qty: 1 KIT | Refills: 0 | Status: SHIPPED | OUTPATIENT
Start: 2021-05-27 | End: 2021-05-27

## 2021-05-27 NOTE — TELEPHONE ENCOUNTER
Per 5/21 enc  SEE message below:    [place] to 90 units subcutaneous daily.       In addition increase the Novolog to 24 units subcutaneous 3 times per day.       Lets change your CF to the below scale:      100-120 24 units  121-140 26 units  141-160 28 u

## 2021-05-27 NOTE — TELEPHONE ENCOUNTER
From: Deidre Kendall  To: Eliza Morris MD  Sent: 5/27/2021 2:39 PM CDT  Subject: Test Results Question    Hi Dr Malvin Verma,     Here's my latest sugars last adjustment was after the AM fasting on 5/21.  I don't know why my post meals are so much higher la

## 2021-05-28 ENCOUNTER — NURSE ONLY (OUTPATIENT)
Dept: ENDOCRINOLOGY CLINIC | Facility: CLINIC | Age: 36
End: 2021-05-28
Payer: COMMERCIAL

## 2021-05-28 ENCOUNTER — PATIENT MESSAGE (OUTPATIENT)
Dept: ENDOCRINOLOGY CLINIC | Facility: CLINIC | Age: 36
End: 2021-05-28

## 2021-05-28 DIAGNOSIS — E11.65 UNCONTROLLED TYPE 2 DIABETES MELLITUS WITH HYPERGLYCEMIA (HCC): Primary | ICD-10-CM

## 2021-05-28 PROCEDURE — 99211 OFF/OP EST MAY X REQ PHY/QHP: CPT | Performed by: INTERNAL MEDICINE

## 2021-05-28 NOTE — TELEPHONE ENCOUNTER
Noted update from [facility] - however will have CDE still discuss Dexcom option with patient at appointment today.

## 2021-05-28 NOTE — TELEPHONE ENCOUNTER
From: Viral Tadeo  To: Loretta June MD  Sent: 5/28/2021 1:20 PM CDT  Subject: Test Results Question    Jose Ding/Dr Ravi,    I'm doing some calibration readings and it said 222 sugar but meter said 188. Is there any way to calibrate the meter?  A

## 2021-05-28 NOTE — PROGRESS NOTES
Sury 3/4/1985 attended for Intensive Individual Management: DM follow up. Review of glucose logs, diet and insulin adjustment. [place] was seen for DM follow up. She is currently about 7-8 weeks pregnant.  She was started on MDI at last basic nutrition concepts for diabetes management and Reviewed basics of insulin pump therapy as method of insulin delivery; various pumps on market    Patient Goals/Recommendations: Patient chosen goals included in patient's instructions for today.     Pt v

## 2021-06-05 ENCOUNTER — PATIENT MESSAGE (OUTPATIENT)
Dept: ENDOCRINOLOGY CLINIC | Facility: CLINIC | Age: 36
End: 2021-06-05

## 2021-06-07 RX ORDER — INSULIN ASPART 100 [IU]/ML
INJECTION, SOLUTION INTRAVENOUS; SUBCUTANEOUS
Qty: 40 ML | Refills: 5 | Status: SHIPPED | OUTPATIENT
Start: 2021-06-07 | End: 2021-07-02

## 2021-06-07 NOTE — TELEPHONE ENCOUNTER
From: Nieves Ayala  To: Julio Cesar Chandler MD  Sent: 6/5/2021 3:46 PM CDT  Subject: Prescription Question    Hi Dr Alfonso Colby, are you able to send the prescription insulin for the pump?

## 2021-06-10 ENCOUNTER — PATIENT MESSAGE (OUTPATIENT)
Dept: ENDOCRINOLOGY CLINIC | Facility: CLINIC | Age: 36
End: 2021-06-10

## 2021-06-11 ENCOUNTER — TELEPHONE (OUTPATIENT)
Dept: ENDOCRINOLOGY CLINIC | Facility: CLINIC | Age: 36
End: 2021-06-11

## 2021-06-11 RX ORDER — BLOOD-GLUCOSE SENSOR
1 EACH MISCELLANEOUS
Qty: 3 EACH | Refills: 2 | Status: SHIPPED | OUTPATIENT
Start: 2021-06-11

## 2021-06-11 NOTE — TELEPHONE ENCOUNTER
From: Colette Long  To: Cecily Castle MD  Sent: 6/10/2021 10:28 PM CDT  Subject: Prescription Question    Hi dr Joel Doll they were able to fill the pump insulin prescription today so I'm ready to set it up just picked it up a few hours ago.  I still ha

## 2021-06-11 NOTE — TELEPHONE ENCOUNTER
Patient returned call.  She started her previous [employer] insulin pump from last pregnancy last night around 10 pm. She is running the most recent settings from pump (which are settings from her third trimester of last pregnancy.) Of note, in previous preg

## 2021-06-11 NOTE — TELEPHONE ENCOUNTER
From: Taya De Leon  To: Geraldo Hughes MD  Sent: 6/10/2021 10:40 PM CDT  Subject: Prescription Question    I don't think this is right it looks like they gave me vials?  I attached a picture of what they gave me

## 2021-06-11 NOTE — TELEPHONE ENCOUNTER
See other patient email from today. LMTCB and also replied to patient to see if she can come for visit for assistance setting up pump.  We will definitely continue Dexcom in the meantime while waiting for [employer] CGM approval.

## 2021-06-11 NOTE — TELEPHONE ENCOUNTER
From: Samuel Silva  To: Daniella Sutherland MD  Sent: 6/10/2021 10:44 PM CDT  Subject: Prescription Question    Never mind I think I remember how to do this now sorry maybe someone could call em tomorrow to make sure I have the settings all right?  I'm of

## 2021-06-11 NOTE — TELEPHONE ENCOUNTER
See other patient email from today. LMTCB and also replied to patient to see if she can come for visit for assistance setting up pump. Prescription for vials is correct since transitioning to insulin pump.

## 2021-06-11 NOTE — TELEPHONE ENCOUNTER
Received fax from [employer] requesting we fax back a completed CMN form for CGM and pump supplies. Completed and faxed back.

## 2021-06-11 NOTE — TELEPHONE ENCOUNTER
Called patient and assisted her in changing settings over the phone. Reminded her of appt for next week to download pump. Instructed her to call over the weekend if blood sugars remain elevated above goals for pregnancy.

## 2021-06-11 NOTE — TELEPHONE ENCOUNTER
Please adjust insulin pump as noted below.      Basal:   12A 1.30 -->1.50   4A 1.50 -->2.0  8A 1.50     Bolus:   12A 5.0  4P 4.0      Sensitivity 40 -->30     Blood Glucose Target  12A   7A 80-90  10P

## 2021-06-17 ENCOUNTER — PATIENT MESSAGE (OUTPATIENT)
Dept: ENDOCRINOLOGY CLINIC | Facility: CLINIC | Age: 36
End: 2021-06-17

## 2021-06-17 ENCOUNTER — NURSE ONLY (OUTPATIENT)
Dept: ENDOCRINOLOGY CLINIC | Facility: CLINIC | Age: 36
End: 2021-06-17
Payer: COMMERCIAL

## 2021-06-17 DIAGNOSIS — O24.111 PREGNANCY COMPLICATED BY PRE-EXISTING TYPE 2 DIABETES IN FIRST TRIMESTER: Primary | ICD-10-CM

## 2021-06-17 LAB — HCG SERPL-ACNC: NORMAL MIU/ML

## 2021-06-17 PROCEDURE — 99211 OFF/OP EST MAY X REQ PHY/QHP: CPT | Performed by: INTERNAL MEDICINE

## 2021-06-18 ENCOUNTER — TELEPHONE (OUTPATIENT)
Dept: ENDOCRINOLOGY CLINIC | Facility: CLINIC | Age: 36
End: 2021-06-18

## 2021-06-18 NOTE — TELEPHONE ENCOUNTER
Fax received from [employer] stating that we must resubmit the CMN form for [employer] CGM supplies. Completed and place on MD desk for signature.

## 2021-06-18 NOTE — PROGRESS NOTES
Svetlana Tolliver  : 3/4/1985 was seen for Individual Insulin Pump Follow up: [employer] 670G    Date: 2021     Start time: 4:55 pm  End time: 5:45 pm    Destini was seen for download and adjustment of insulin pump.  She is currently about 10 wee plan.    Patient Goals/Recommendations: Patient chosen goals included in patient's instructions for today.     Plan: Follow up appt 1 week for pump adjustment and 1 month with MD.    Patient verbalized understanding and has no further questions at this time

## 2021-06-22 NOTE — TELEPHONE ENCOUNTER
From: Samuel Silva  Sent: 6/22/2021 7:24 AM CDT  To: Kathryn Beckman Clinical Staff  Subject: RE: link for inserting sensor video    Good morning,     I'm still finding myself having to make middle of the night corrections and even during the day when I k

## 2021-06-22 NOTE — TELEPHONE ENCOUNTER
Responded to patient asking if she can upload her pump so it can be printed remotely for on call provider or APN to review.

## 2021-06-23 NOTE — TELEPHONE ENCOUNTER
Checked [employer]. Data is available up to 06/17/21. Provided information on how to upload pump via [employer].

## 2021-06-24 ENCOUNTER — NURSE ONLY (OUTPATIENT)
Dept: ENDOCRINOLOGY CLINIC | Facility: CLINIC | Age: 36
End: 2021-06-24
Payer: COMMERCIAL

## 2021-06-24 ENCOUNTER — TELEPHONE (OUTPATIENT)
Dept: OBGYN | Age: 36
End: 2021-06-24

## 2021-06-24 ENCOUNTER — EXTERNAL RECORD (OUTPATIENT)
Dept: OTHER | Age: 36
End: 2021-06-24

## 2021-06-24 DIAGNOSIS — E11.65 UNCONTROLLED TYPE 2 DIABETES MELLITUS WITH HYPERGLYCEMIA (HCC): ICD-10-CM

## 2021-06-24 DIAGNOSIS — O24.111 PREGNANCY COMPLICATED BY PRE-EXISTING TYPE 2 DIABETES IN FIRST TRIMESTER: Primary | ICD-10-CM

## 2021-06-24 PROCEDURE — 3051F HG A1C>EQUAL 7.0%<8.0%: CPT | Performed by: INTERNAL MEDICINE

## 2021-06-24 PROCEDURE — 83036 HEMOGLOBIN GLYCOSYLATED A1C: CPT | Performed by: INTERNAL MEDICINE

## 2021-06-24 PROCEDURE — 36416 COLLJ CAPILLARY BLOOD SPEC: CPT | Performed by: INTERNAL MEDICINE

## 2021-06-24 NOTE — PROGRESS NOTES
Svetlana Tolliver  : 3/4/1985 was seen for Individual Insulin Pump Follow up: Medtronic 670G with guardian sensor.      Date: 2021                              Start time: 3:45 pm  End time: 4:25 pm pm     Farrah Hinkle was seen for download and adju glucose levels are at target.      Assisted patient in starting Medtr[employer] Guardian CGM today and connecting to pump.  Reviewed how to share [facility] with clinic so that we can remotely review downloads if needed.      Pump setting changes:  S

## 2021-06-27 ENCOUNTER — PATIENT MESSAGE (OUTPATIENT)
Dept: ENDOCRINOLOGY CLINIC | Facility: CLINIC | Age: 36
End: 2021-06-27

## 2021-06-28 ENCOUNTER — TELEPHONE (OUTPATIENT)
Dept: ENDOCRINOLOGY CLINIC | Facility: CLINIC | Age: 36
End: 2021-06-28

## 2021-06-28 ENCOUNTER — PATIENT MESSAGE (OUTPATIENT)
Dept: ENDOCRINOLOGY CLINIC | Facility: CLINIC | Age: 36
End: 2021-06-28

## 2021-06-28 RX ORDER — BLOOD SUGAR DIAGNOSTIC
STRIP MISCELLANEOUS
Qty: 200 STRIP | Refills: 3 | Status: SHIPPED | OUTPATIENT
Start: 2021-06-28 | End: 2021-10-18

## 2021-06-28 NOTE — TELEPHONE ENCOUNTER
From: Mayra Treviño  Sent: 6/28/2021 12:28 PM CDT  To: Kathryn Beckman Clinical Staff  Subject: RE:Blood sugar update    They are slightly better but still needing a lot of corrections.  They definitely are better with food but overnight is still terrible j

## 2021-06-28 NOTE — TELEPHONE ENCOUNTER
Emailed patient back. Regarding blood sugars and did suggest additional pump setting change as noted in [employer]. She does report improved sugars since I saw her last week.      I asked her about adding her on Friday but she will be out of [address]

## 2021-06-28 NOTE — TELEPHONE ENCOUNTER
Fax received dated 06/17/21. Attached are Med[employer] 630G  Order forms. Gave forms to CDE for further review.

## 2021-06-28 NOTE — TELEPHONE ENCOUNTER
From: González Paz  To: Laurel Gilbert MD  Sent: 6/27/2021 5:59 PM CDT  Subject: Prescription Question    Hi Dr Wanda Rodríguez,    Can you send a new prescription for the one touch verio test strips? Thanks so much!

## 2021-06-30 ENCOUNTER — PATIENT MESSAGE (OUTPATIENT)
Dept: ENDOCRINOLOGY CLINIC | Facility: CLINIC | Age: 36
End: 2021-06-30

## 2021-06-30 RX ORDER — INSULIN DEGLUDEC 200 U/ML
100 INJECTION, SOLUTION SUBCUTANEOUS DAILY
Qty: 9 ML | Refills: 0 | OUTPATIENT
Start: 2021-06-30

## 2021-06-30 RX ORDER — DAPAGLIFLOZIN 10 MG/1
TABLET, FILM COATED ORAL
Qty: 90 TABLET | Refills: 1 | OUTPATIENT
Start: 2021-06-30

## 2021-06-30 NOTE — TELEPHONE ENCOUNTER
Pt is out of town 7/9/21 - She is requesting 7/13/21 since she already has another apt in the [facility] but Dr Franki Topete is not in clinic on Tuesdays. Please advise?

## 2021-06-30 NOTE — TELEPHONE ENCOUNTER
LOV: 05/14/21    Dr. Hardy Falling -- please review refills that came through the pharmacy (farxiga and tresiba). I don't see Jazmyn Perez on your LOV note and she's currently on pump therapy.  Pended one box of tresiba (back up?)

## 2021-06-30 NOTE — TELEPHONE ENCOUNTER
I'm not in clinic on the 13th. She had initially requested the 8th - lets do that day at 6:30pm. Thanks.

## 2021-06-30 NOTE — TELEPHONE ENCOUNTER
Replied to patient's [employer] message with date and time provider by Dr. Pena Rising below. Awaiting patient response.

## 2021-07-01 ENCOUNTER — EXTERNAL RECORD (OUTPATIENT)
Dept: OTHER | Age: 36
End: 2021-07-01

## 2021-07-01 LAB — HCG SERPL-ACNC: NORMAL MIU/ML

## 2021-07-01 NOTE — TELEPHONE ENCOUNTER
From: Viral Tadeo  To: Loretta June MD  Sent: 6/30/2021 7:11 PM CDT  Subject: Other    There wasn't a reply option to to the last message but yes 6:30pm on 7/8 works

## 2021-07-02 ENCOUNTER — PATIENT MESSAGE (OUTPATIENT)
Dept: ENDOCRINOLOGY CLINIC | Facility: CLINIC | Age: 36
End: 2021-07-02

## 2021-07-02 RX ORDER — INSULIN ASPART 100 [IU]/ML
INJECTION, SOLUTION INTRAVENOUS; SUBCUTANEOUS
Qty: 50 ML | Refills: 5 | Status: SHIPPED | OUTPATIENT
Start: 2021-07-02 | End: 2021-07-08

## 2021-07-02 NOTE — TELEPHONE ENCOUNTER
From: Taya De Leon  To: Geraldo Hughes MD  Sent: 7/2/2021 3:56 AM CDT  Subject: Prescription Question    Hi Dr Shireen Reid I'm on my last vial amd there's barely any left. I thought I had one more but I guess I don't.  Can you send an updated prescription

## 2021-07-08 ENCOUNTER — OFFICE VISIT (OUTPATIENT)
Dept: ENDOCRINOLOGY CLINIC | Facility: CLINIC | Age: 36
End: 2021-07-08
Payer: COMMERCIAL

## 2021-07-08 ENCOUNTER — OFFICE VISIT (OUTPATIENT)
Dept: OBGYN | Age: 36
End: 2021-07-08

## 2021-07-08 VITALS
DIASTOLIC BLOOD PRESSURE: 77 MMHG | SYSTOLIC BLOOD PRESSURE: 112 MMHG | WEIGHT: 293 LBS | BODY MASS INDEX: 50 KG/M2 | HEART RATE: 103 BPM

## 2021-07-08 VITALS
HEART RATE: 96 BPM | HEIGHT: 65 IN | DIASTOLIC BLOOD PRESSURE: 81 MMHG | SYSTOLIC BLOOD PRESSURE: 123 MMHG | WEIGHT: 293 LBS | BODY MASS INDEX: 48.82 KG/M2 | TEMPERATURE: 99 F

## 2021-07-08 DIAGNOSIS — O24.119 TYPE 2 DIABETES MELLITUS COMPLICATING PREGNANCY, ANTEPARTUM: ICD-10-CM

## 2021-07-08 DIAGNOSIS — O99.280 HYPOTHYROID IN PREGNANCY, ANTEPARTUM: ICD-10-CM

## 2021-07-08 DIAGNOSIS — O09.521 MULTIGRAVIDA OF ADVANCED MATERNAL AGE IN FIRST TRIMESTER: Primary | ICD-10-CM

## 2021-07-08 DIAGNOSIS — E03.9 HYPOTHYROID IN PREGNANCY, ANTEPARTUM: ICD-10-CM

## 2021-07-08 DIAGNOSIS — E11.65 UNCONTROLLED TYPE 2 DIABETES MELLITUS WITH HYPERGLYCEMIA (HCC): Primary | ICD-10-CM

## 2021-07-08 PROBLEM — O24.019 PRE-EXISTING TYPE 1 DIABETES MELLITUS IN PREGNANCY: Status: ACTIVE | Noted: 2021-07-08

## 2021-07-08 PROBLEM — O09.529 ELDERLY MULTIGRAVIDA WITH ANTEPARTUM CONDITION OR COMPLICATION: Status: ACTIVE | Noted: 2019-02-08

## 2021-07-08 PROBLEM — O99.210 OBESITY IN PREGNANCY: Status: ACTIVE | Noted: 2019-02-08

## 2021-07-08 PROBLEM — O09.90 HIGH-RISK PREGNANCY: Status: ACTIVE | Noted: 2021-07-08

## 2021-07-08 PROBLEM — E07.9 ANTEPARTUM THYROID DYSFUNCTION: Status: ACTIVE | Noted: 2019-02-08

## 2021-07-08 LAB
CARTRIDGE LOT#: ABNORMAL NUMERIC
GLUCOSE BLOOD: 132
HEMOGLOBIN A1C: 6.8 % (ref 4.3–5.6)
TEST STRIP LOT #: NORMAL NUMERIC

## 2021-07-08 PROCEDURE — 87801 DETECT AGNT MULT DNA AMPLI: CPT | Performed by: LEGAL MEDICINE

## 2021-07-08 PROCEDURE — 84481 FREE ASSAY (FT-3): CPT | Performed by: LEGAL MEDICINE

## 2021-07-08 PROCEDURE — 3074F SYST BP LT 130 MM HG: CPT | Performed by: INTERNAL MEDICINE

## 2021-07-08 PROCEDURE — 83036 HEMOGLOBIN GLYCOSYLATED A1C: CPT | Performed by: INTERNAL MEDICINE

## 2021-07-08 PROCEDURE — 85027 COMPLETE CBC AUTOMATED: CPT | Performed by: LEGAL MEDICINE

## 2021-07-08 PROCEDURE — 99214 OFFICE O/P EST MOD 30 MIN: CPT | Performed by: INTERNAL MEDICINE

## 2021-07-08 PROCEDURE — 86780 TREPONEMA PALLIDUM: CPT | Performed by: LEGAL MEDICINE

## 2021-07-08 PROCEDURE — 88175 CYTOPATH C/V AUTO FLUID REDO: CPT | Performed by: CLINICAL MEDICAL LABORATORY

## 2021-07-08 PROCEDURE — 36415 COLL VENOUS BLD VENIPUNCTURE: CPT

## 2021-07-08 PROCEDURE — 86900 BLOOD TYPING SEROLOGIC ABO: CPT | Performed by: LEGAL MEDICINE

## 2021-07-08 PROCEDURE — 86762 RUBELLA ANTIBODY: CPT | Performed by: LEGAL MEDICINE

## 2021-07-08 PROCEDURE — 36416 COLLJ CAPILLARY BLOOD SPEC: CPT | Performed by: INTERNAL MEDICINE

## 2021-07-08 PROCEDURE — 82947 ASSAY GLUCOSE BLOOD QUANT: CPT | Performed by: INTERNAL MEDICINE

## 2021-07-08 PROCEDURE — 86850 RBC ANTIBODY SCREEN: CPT | Performed by: LEGAL MEDICINE

## 2021-07-08 PROCEDURE — 3078F DIAST BP <80 MM HG: CPT | Performed by: INTERNAL MEDICINE

## 2021-07-08 PROCEDURE — 87624 HPV HI-RISK TYP POOLED RSLT: CPT | Performed by: CLINICAL MEDICAL LABORATORY

## 2021-07-08 PROCEDURE — 3044F HG A1C LEVEL LT 7.0%: CPT | Performed by: INTERNAL MEDICINE

## 2021-07-08 PROCEDURE — 83036 HEMOGLOBIN GLYCOSYLATED A1C: CPT | Performed by: LEGAL MEDICINE

## 2021-07-08 PROCEDURE — 84439 ASSAY OF FREE THYROXINE: CPT | Performed by: LEGAL MEDICINE

## 2021-07-08 PROCEDURE — 86592 SYPHILIS TEST NON-TREP QUAL: CPT | Performed by: LEGAL MEDICINE

## 2021-07-08 PROCEDURE — 84443 ASSAY THYROID STIM HORMONE: CPT | Performed by: LEGAL MEDICINE

## 2021-07-08 PROCEDURE — 86593 SYPHILIS TEST NON-TREP QUANT: CPT | Performed by: LEGAL MEDICINE

## 2021-07-08 PROCEDURE — 86901 BLOOD TYPING SEROLOGIC RH(D): CPT | Performed by: LEGAL MEDICINE

## 2021-07-08 PROCEDURE — 87086 URINE CULTURE/COLONY COUNT: CPT | Performed by: LEGAL MEDICINE

## 2021-07-08 PROCEDURE — 87389 HIV-1 AG W/HIV-1&-2 AB AG IA: CPT | Performed by: LEGAL MEDICINE

## 2021-07-08 PROCEDURE — 87340 HEPATITIS B SURFACE AG IA: CPT | Performed by: LEGAL MEDICINE

## 2021-07-08 PROCEDURE — 0500F INITIAL PRENATAL CARE VISIT: CPT | Performed by: LEGAL MEDICINE

## 2021-07-08 RX ORDER — LEVOTHYROXINE SODIUM 300 UG/1
275 TABLET ORAL DAILY
COMMUNITY

## 2021-07-08 RX ORDER — INSULIN HUMAN 500 [IU]/ML
INJECTION, SOLUTION SUBCUTANEOUS
Qty: 80 ML | Refills: 2 | Status: SHIPPED | OUTPATIENT
Start: 2021-07-08 | End: 2021-11-05

## 2021-07-09 LAB
C TRACH RRNA CVX QL NAA+PROBE: NEGATIVE
Lab: NORMAL
N GONORRHOEA RRNA CVX QL NAA+PROBE: NEGATIVE

## 2021-07-09 NOTE — PROGRESS NOTES
Name: Svetlana Tolliver  Date: 7/8/2021    Referring Physician: No ref. provider found    HISTORY OF [address] [facility] Daria Everett is a 39year old female who presents for diabetes mellitus.       She has now delivered 3rd baby -  Baby boy [facility] 100 UNIT/ML Subcutaneous Solution, Inject 200 units subcutaneous daily via insulin pump, Disp: 50 mL, Rfl: 5  •  ONETOUCH VERIO In Vitro Strip, Check 4 times daily, Disp: 200 strip, Rfl: 3  •  Continuous Blood Gluc Sensor (DEXCOM G6 SENSOR) Does not apply Aspart Pen (NOVOLOG FLEXPEN) 100 UNIT/ML Subcutaneous Solution Pen-injector, Inject 50 units daily via correction factor (Patient not taking: Reported on 7/8/2021 ), Disp: 45 mL, Rfl: 1  •  LEVOTHYROXINE SODIUM 175 MCG Oral Tab, TAKE 1 TABLET BY MOUTH EVER increased work of breathing. Skin:  normal moisture and skin texture  Hematologic:  no excessive bruising  Psychiatric:  oriented to time, self, and place      ASSESSMENT/PLAN:      1.  Diabetes Mellitus Type 2, controlled  -Controlled, HgA1c 6.8% -->imp

## 2021-07-09 NOTE — PATIENT INSTRUCTIONS
New pump settings    Basal:  12A 0.9  8A 0.7    Bolus:    Insulin to carb ratio  12A 14  4P 10    Correction Factor 60    Active Insulin Time 5 hours

## 2021-07-10 LAB
ABO + RH BLD: NORMAL
BACTERIA UR CULT: NORMAL
BLD GP AB SCN SERPL QL GEL: NEGATIVE
DEPRECATED RDW RBC: 44 FL (ref 39–50)
ERYTHROCYTE [DISTWIDTH] IN BLOOD: 13.7 % (ref 11–15)
HBA1C MFR BLD: 6.8 % (ref 4.5–5.6)
HBV SURFACE AG SER QL: NEGATIVE
HCT VFR BLD CALC: 38.3 % (ref 36–46.5)
HGB BLD-MCNC: 12.5 G/DL (ref 12–15.5)
HIV 1+2 AB+HIV1 P24 AG SERPL QL IA: NONREACTIVE
MCH RBC QN AUTO: 28.7 PG (ref 26–34)
MCHC RBC AUTO-ENTMCNC: 32.6 G/DL (ref 32–36.5)
MCV RBC AUTO: 88 FL (ref 78–100)
NRBC BLD MANUAL-RTO: 0 /100 WBC
PLATELET # BLD AUTO: 180 K/MCL (ref 140–450)
RBC # BLD: 4.35 MIL/MCL (ref 4–5.2)
RPR SER QL: REACTIVE
RPR SER-TITR: ABNORMAL {TITER}
RUBV IGG SERPL IA-ACNC: 425.4 UNITS/ML
T PALLIDUM IGG SER QL IA: NONREACTIVE
T3FREE SERPL-MCNC: 2.7 PG/ML (ref 2.2–4)
T4 FREE SERPL-MCNC: 1.2 NG/DL (ref 0.8–1.5)
TSH SERPL-ACNC: 2.28 MCUNITS/ML (ref 0.35–5)
WBC # BLD: 9.6 K/MCL (ref 4.2–11)

## 2021-07-13 DIAGNOSIS — Z36.9 1ST TRIMESTER SCREENING: Primary | ICD-10-CM

## 2021-07-13 DIAGNOSIS — O09.529 AMA (ADVANCED MATERNAL AGE) MULTIGRAVIDA 35+: ICD-10-CM

## 2021-07-14 ENCOUNTER — TELEPHONE (OUTPATIENT)
Dept: MATERNAL FETAL MEDICINE | Age: 36
End: 2021-07-14

## 2021-07-15 ENCOUNTER — HOSPITAL ENCOUNTER (OUTPATIENT)
Dept: LAB | Age: 36
Discharge: HOME | End: 2021-07-15
Attending: LEGAL MEDICINE

## 2021-07-15 ENCOUNTER — OFFICE VISIT (OUTPATIENT)
Dept: MATERNAL FETAL MEDICINE | Age: 36
End: 2021-07-15
Attending: LEGAL MEDICINE

## 2021-07-15 ENCOUNTER — LAB REQUISITION (OUTPATIENT)
Dept: LAB | Age: 36
End: 2021-07-15

## 2021-07-15 VITALS
BODY MASS INDEX: 48.82 KG/M2 | WEIGHT: 293 LBS | HEIGHT: 65 IN | DIASTOLIC BLOOD PRESSURE: 68 MMHG | SYSTOLIC BLOOD PRESSURE: 132 MMHG

## 2021-07-15 DIAGNOSIS — O09.522 SUPERVISION OF ELDERLY MULTIGRAVIDA, SECOND TRIMESTER: ICD-10-CM

## 2021-07-15 DIAGNOSIS — O09.529 ELDERLY MULTIGRAVIDA WITH ANTEPARTUM CONDITION OR COMPLICATION: Primary | ICD-10-CM

## 2021-07-15 DIAGNOSIS — O24.011 PRE-EXISTING TYPE 1 DIABETES MELLITUS DURING PREGNANCY IN FIRST TRIMESTER: ICD-10-CM

## 2021-07-15 DIAGNOSIS — O99.280 ANTEPARTUM THYROID DYSFUNCTION: ICD-10-CM

## 2021-07-15 DIAGNOSIS — E66.01 SEVERE OBESITY (BMI >= 40) (CMD): ICD-10-CM

## 2021-07-15 DIAGNOSIS — O24.011 PRE-EXISTING TYPE 1 DIABETES MELLITUS DURING PREGNANCY IN FIRST TRIMESTER: Primary | ICD-10-CM

## 2021-07-15 DIAGNOSIS — O09.299: ICD-10-CM

## 2021-07-15 DIAGNOSIS — Z36.9 1ST TRIMESTER SCREENING: ICD-10-CM

## 2021-07-15 DIAGNOSIS — O09.522 MULTIGRAVIDA OF ADVANCED MATERNAL AGE IN SECOND TRIMESTER: ICD-10-CM

## 2021-07-15 DIAGNOSIS — E07.9 ANTEPARTUM THYROID DYSFUNCTION: ICD-10-CM

## 2021-07-15 DIAGNOSIS — E10.9 TYPE 1 DIABETES MELLITUS WITHOUT COMPLICATION (CMD): ICD-10-CM

## 2021-07-15 LAB
BKR KIT TESTING DISCLAIMER STATEMENT: NORMAL
CASE RPRT: NORMAL
CYTOLOGY CVX/VAG STUDY: NORMAL
HPV16+18+45 E6+E7MRNA CVX NAA+PROBE: NEGATIVE
Lab: NORMAL
PAP EDUCATIONAL NOTE: NORMAL
SPECIMEN ADEQUACY: NORMAL

## 2021-07-15 PROCEDURE — 3075F SYST BP GE 130 - 139MM HG: CPT | Performed by: OBSTETRICS & GYNECOLOGY

## 2021-07-15 PROCEDURE — 76813 OB US NUCHAL MEAS 1 GEST: CPT | Performed by: OBSTETRICS & GYNECOLOGY

## 2021-07-15 PROCEDURE — 99243 OFF/OP CNSLTJ NEW/EST LOW 30: CPT | Performed by: OBSTETRICS & GYNECOLOGY

## 2021-07-15 PROCEDURE — 3078F DIAST BP <80 MM HG: CPT | Performed by: OBSTETRICS & GYNECOLOGY

## 2021-07-16 LAB — PANORAMA  PRENATAL SCREEN SUMMARY: NORMAL

## 2021-07-21 ENCOUNTER — TELEPHONE (OUTPATIENT)
Dept: MATERNAL FETAL MEDICINE | Age: 36
End: 2021-07-21

## 2021-08-04 ENCOUNTER — OB CHECK (OUTPATIENT)
Dept: OBGYN | Age: 36
End: 2021-08-04

## 2021-08-04 ENCOUNTER — PATIENT MESSAGE (OUTPATIENT)
Dept: ENDOCRINOLOGY CLINIC | Facility: CLINIC | Age: 36
End: 2021-08-04

## 2021-08-04 ENCOUNTER — TELEPHONE (OUTPATIENT)
Dept: ENDOCRINOLOGY CLINIC | Facility: CLINIC | Age: 36
End: 2021-08-04

## 2021-08-04 VITALS
SYSTOLIC BLOOD PRESSURE: 118 MMHG | BODY MASS INDEX: 48.82 KG/M2 | WEIGHT: 293 LBS | DIASTOLIC BLOOD PRESSURE: 75 MMHG | HEART RATE: 104 BPM | HEIGHT: 65 IN

## 2021-08-04 DIAGNOSIS — Z34.82 ENCOUNTER FOR SUPERVISION OF NORMAL PREGNANCY IN MULTIGRAVIDA IN SECOND TRIMESTER: Primary | ICD-10-CM

## 2021-08-04 DIAGNOSIS — Z36.1 SCREENING FOR RAISED ALPHA-FETOPROTEIN LEVELS IN AMNIOTIC FLUID: ICD-10-CM

## 2021-08-04 PROBLEM — Z01.818 PREOP EXAMINATION: Status: RESOLVED | Noted: 2019-06-04 | Resolved: 2021-08-04

## 2021-08-04 PROCEDURE — 0502F SUBSEQUENT PRENATAL CARE: CPT | Performed by: OBSTETRICS & GYNECOLOGY

## 2021-08-04 RX ORDER — METFORMIN HYDROCHLORIDE 500 MG/1
1000 TABLET, EXTENDED RELEASE ORAL DAILY
COMMUNITY
Start: 2021-04-28

## 2021-08-04 RX ORDER — BLOOD SUGAR DIAGNOSTIC
STRIP MISCELLANEOUS
COMMUNITY
Start: 2021-07-01

## 2021-08-05 ENCOUNTER — LAB ENCOUNTER (OUTPATIENT)
Dept: LAB | Facility: HOSPITAL | Age: 36
End: 2021-08-05
Attending: INTERNAL MEDICINE
Payer: COMMERCIAL

## 2021-08-05 ENCOUNTER — NURSE ONLY (OUTPATIENT)
Dept: ENDOCRINOLOGY CLINIC | Facility: CLINIC | Age: 36
End: 2021-08-05
Payer: COMMERCIAL

## 2021-08-05 DIAGNOSIS — E03.8 HYPOTHYROIDISM DUE TO HASHIMOTO'S THYROIDITIS: ICD-10-CM

## 2021-08-05 DIAGNOSIS — E06.3 HYPOTHYROIDISM DUE TO HASHIMOTO'S THYROIDITIS: ICD-10-CM

## 2021-08-05 DIAGNOSIS — O24.112 PREGNANCY COMPLICATED BY PRE-EXISTING TYPE 2 DIABETES IN SECOND TRIMESTER: Primary | ICD-10-CM

## 2021-08-05 LAB
CARTRIDGE LOT#: ABNORMAL NUMERIC
HEMOGLOBIN A1C: 6.2 % (ref 4.3–5.6)
T4 FREE SERPL-MCNC: 0.9 NG/DL (ref 0.8–1.7)
TSI SER-ACNC: 4.72 MIU/ML (ref 0.36–3.74)

## 2021-08-05 PROCEDURE — 36416 COLLJ CAPILLARY BLOOD SPEC: CPT | Performed by: INTERNAL MEDICINE

## 2021-08-05 PROCEDURE — 84439 ASSAY OF FREE THYROXINE: CPT

## 2021-08-05 PROCEDURE — 36415 COLL VENOUS BLD VENIPUNCTURE: CPT

## 2021-08-05 PROCEDURE — 83036 HEMOGLOBIN GLYCOSYLATED A1C: CPT | Performed by: INTERNAL MEDICINE

## 2021-08-05 PROCEDURE — 84443 ASSAY THYROID STIM HORMONE: CPT

## 2021-08-05 PROCEDURE — 3044F HG A1C LEVEL LT 7.0%: CPT | Performed by: INTERNAL MEDICINE

## 2021-08-05 NOTE — TELEPHONE ENCOUNTER
From: Corky Coates  To: Elisa Adams MD  Sent: 8/4/2021 5:33 PM CDT  Subject: Non-Urgent Medical Question    Hi Dr Ken Mcmillan, wanted to check in and see when you wanted me to come by next for A1C and thyroid labs?

## 2021-08-05 NOTE — PROGRESS NOTES
Helen Cortes  : 3/4/1985 was seen for Individual Insulin Pump Follow up: [employer]onic 670G with guardian sensor.      Date: 2021                              Start time: 5:45 pm  End CTKQ: 2:41 pm     Omid Jones was seen for download and adjustm understanding and has no further questions at this time.     Alta Ortiz RN, CDE

## 2021-08-05 NOTE — TELEPHONE ENCOUNTER
Dr Lizzy Winter per lov: 7/8 patient needed to return visit in 1 month. No lars   Please adivse if you want labs and when visit.  thanks

## 2021-08-06 ENCOUNTER — TELEPHONE (OUTPATIENT)
Dept: ENDOCRINOLOGY CLINIC | Facility: CLINIC | Age: 36
End: 2021-08-06

## 2021-08-06 ENCOUNTER — LAB SERVICES (OUTPATIENT)
Dept: LAB | Age: 36
End: 2021-08-06

## 2021-08-06 ENCOUNTER — PATIENT MESSAGE (OUTPATIENT)
Dept: ENDOCRINOLOGY CLINIC | Facility: CLINIC | Age: 36
End: 2021-08-06

## 2021-08-06 DIAGNOSIS — Z36.1 SCREENING FOR RAISED ALPHA-FETOPROTEIN LEVELS IN AMNIOTIC FLUID: ICD-10-CM

## 2021-08-06 DIAGNOSIS — E06.3 HYPOTHYROIDISM DUE TO HASHIMOTO'S THYROIDITIS: ICD-10-CM

## 2021-08-06 DIAGNOSIS — E03.8 HYPOTHYROIDISM DUE TO HASHIMOTO'S THYROIDITIS: ICD-10-CM

## 2021-08-06 DIAGNOSIS — O24.112 PREGNANCY COMPLICATED BY PRE-EXISTING TYPE 2 DIABETES IN SECOND TRIMESTER: Primary | ICD-10-CM

## 2021-08-06 DIAGNOSIS — Z34.82 ENCOUNTER FOR SUPERVISION OF NORMAL PREGNANCY IN MULTIGRAVIDA IN SECOND TRIMESTER: ICD-10-CM

## 2021-08-06 PROCEDURE — 36415 COLL VENOUS BLD VENIPUNCTURE: CPT | Performed by: OBSTETRICS & GYNECOLOGY

## 2021-08-06 PROCEDURE — 82105 ALPHA-FETOPROTEIN SERUM: CPT | Performed by: OBSTETRICS & GYNECOLOGY

## 2021-08-06 RX ORDER — LEVOTHYROXINE SODIUM 0.2 MG/1
200 TABLET ORAL
Qty: 90 TABLET | Refills: 1 | Status: SHIPPED | OUTPATIENT
Start: 2021-08-06 | End: 2022-01-31

## 2021-08-06 RX ORDER — LEVOTHYROXINE SODIUM 0.05 MG/1
50 TABLET ORAL
Qty: 90 TABLET | Refills: 1 | Status: SHIPPED | OUTPATIENT
Start: 2021-08-06 | End: 2021-10-29

## 2021-08-06 NOTE — TELEPHONE ENCOUNTER
rn called patient with message by dr Vishnu Maurer below patient verbalized understanding of instructions  Labs ordered and med sent to pharmacy  Dr Alpesh Cheng pt needs lars in 1 month, please advise where to fit in ? Thanks.

## 2021-08-06 NOTE — TELEPHONE ENCOUNTER
Please call patient - she is not getting enough thyroid hormone. Increase Levothyroxine to 250mcg PO daily add 50mcg tablets #90, refill 1 and recheck TSH, FT4 in one month. Thanks.

## 2021-08-06 NOTE — TELEPHONE ENCOUNTER
Discontinued by provider on 7/2    Patient now on U-500.  Please see LOV note 7/8/21 and CDE OV from 8/5/21

## 2021-08-09 LAB
# FETUSES US: NORMAL
AFP MOM SERPL: 0.54 MOM
AFP SERPL-MCNC: 10.5 NG/ML
AGE AT DELIVERY: 37.86
GA: NORMAL WK
HX OF TRISOMY 21 NARR: NO
IDDM PATIENT QL: YES
METHOD BEST OVERALL GA ESTIMATE: NORMAL
NEURAL TUBE DEFECT RISK FETUS: NORMAL %
SERVICE CMNT-IMP: NORMAL
SERVICE CMNT-IMP: NORMAL
TS 21 RISK FETUS: NORMAL

## 2021-08-09 NOTE — TELEPHONE ENCOUNTER
From: Nieves Ayala  To: Julio Cesar Chandler MD  Sent: 8/6/2021 1:22 PM CDT  Subject: Other    Hi Stella, the message didn't allow for a reply but yes 9/3 at 12:15 should be fine. Also, I forgot to tell Juice Ruiz yesterday.  The pharmacy filled novolog vials in

## 2021-08-10 RX ORDER — INSULIN ASPART 100 [IU]/ML
INJECTION, SOLUTION INTRAVENOUS; SUBCUTANEOUS
Qty: 9 ML | Refills: 0 | Status: SHIPPED | OUTPATIENT
Start: 2021-08-10 | End: 2021-08-31

## 2021-08-10 NOTE — TELEPHONE ENCOUNTER
Dr. Darshana Ricks, patient requesting Novolog pens in case of pump failure. 3 pens pended. Appt scheduled.

## 2021-08-31 RX ORDER — INSULIN ASPART 100 [IU]/ML
INJECTION, SOLUTION INTRAVENOUS; SUBCUTANEOUS
Qty: 15 ML | Refills: 0 | Status: SHIPPED | OUTPATIENT
Start: 2021-08-31 | End: 2021-09-02

## 2021-08-31 NOTE — TELEPHONE ENCOUNTER
Spoke to [facility] from pharmacy and states they can have the Humulin R-U500 today as he ran the test claim.   RN also asked if they received the prescription for the novolog flex pen in case her pump fails and confirmed they do but office would need to send an

## 2021-08-31 NOTE — TELEPHONE ENCOUNTER
Pt. States that she is pregnant and she is out of Humulin insulin for her pump and she is out of the Novolog also. Pt. Has questions about getting her Novlog and Humulin. Pt. States that she is on her last vial. Pt. Is requesting to speak to Nurse Tyler Yin.

## 2021-09-01 ENCOUNTER — OB CHECK (OUTPATIENT)
Dept: OBGYN | Age: 36
End: 2021-09-01

## 2021-09-01 ENCOUNTER — LAB ENCOUNTER (OUTPATIENT)
Dept: LAB | Facility: HOSPITAL | Age: 36
End: 2021-09-01
Attending: INTERNAL MEDICINE
Payer: COMMERCIAL

## 2021-09-01 VITALS
BODY MASS INDEX: 48.82 KG/M2 | HEART RATE: 105 BPM | HEIGHT: 65 IN | DIASTOLIC BLOOD PRESSURE: 70 MMHG | SYSTOLIC BLOOD PRESSURE: 113 MMHG | WEIGHT: 293 LBS

## 2021-09-01 DIAGNOSIS — E06.3 HYPOTHYROIDISM DUE TO HASHIMOTO'S THYROIDITIS: ICD-10-CM

## 2021-09-01 DIAGNOSIS — O09.93 HIGH-RISK PREGNANCY IN THIRD TRIMESTER: Primary | ICD-10-CM

## 2021-09-01 DIAGNOSIS — E03.8 HYPOTHYROIDISM DUE TO HASHIMOTO'S THYROIDITIS: ICD-10-CM

## 2021-09-01 LAB
T4 FREE SERPL-MCNC: 1 NG/DL (ref 0.8–1.7)
TSI SER-ACNC: 2.37 MIU/ML (ref 0.36–3.74)

## 2021-09-01 PROCEDURE — 99214 OFFICE O/P EST MOD 30 MIN: CPT | Performed by: OBSTETRICS & GYNECOLOGY

## 2021-09-01 PROCEDURE — 3078F DIAST BP <80 MM HG: CPT | Performed by: OBSTETRICS & GYNECOLOGY

## 2021-09-01 PROCEDURE — 84443 ASSAY THYROID STIM HORMONE: CPT

## 2021-09-01 PROCEDURE — 84439 ASSAY OF FREE THYROXINE: CPT

## 2021-09-01 PROCEDURE — 3074F SYST BP LT 130 MM HG: CPT | Performed by: OBSTETRICS & GYNECOLOGY

## 2021-09-01 PROCEDURE — 36415 COLL VENOUS BLD VENIPUNCTURE: CPT

## 2021-09-02 ENCOUNTER — OFFICE VISIT (OUTPATIENT)
Dept: ENDOCRINOLOGY CLINIC | Facility: CLINIC | Age: 36
End: 2021-09-02
Payer: COMMERCIAL

## 2021-09-02 ENCOUNTER — TELEPHONE (OUTPATIENT)
Dept: ENDOCRINOLOGY CLINIC | Facility: CLINIC | Age: 36
End: 2021-09-02

## 2021-09-02 ENCOUNTER — OFFICE VISIT (OUTPATIENT)
Dept: MATERNAL FETAL MEDICINE | Age: 36
End: 2021-09-02
Attending: LEGAL MEDICINE

## 2021-09-02 VITALS
SYSTOLIC BLOOD PRESSURE: 148 MMHG | BODY MASS INDEX: 53 KG/M2 | HEART RATE: 96 BPM | WEIGHT: 293 LBS | DIASTOLIC BLOOD PRESSURE: 86 MMHG

## 2021-09-02 DIAGNOSIS — E66.01 CLASS 3 SEVERE OBESITY WITH BODY MASS INDEX (BMI) OF 50.0 TO 59.9 IN ADULT, UNSPECIFIED OBESITY TYPE, UNSPECIFIED WHETHER SERIOUS COMORBIDITY PRESENT (CMD): Primary | ICD-10-CM

## 2021-09-02 DIAGNOSIS — E11.65 UNCONTROLLED TYPE 2 DIABETES MELLITUS WITH HYPERGLYCEMIA (HCC): Primary | ICD-10-CM

## 2021-09-02 DIAGNOSIS — E03.9 HYPOTHYROIDISM, UNSPECIFIED TYPE: ICD-10-CM

## 2021-09-02 DIAGNOSIS — O34.219 PREVIOUS CESAREAN DELIVERY, ANTEPARTUM CONDITION OR COMPLICATION: ICD-10-CM

## 2021-09-02 DIAGNOSIS — Z36.3 ENCOUNTER FOR ANTENATAL SCREENING FOR MALFORMATIONS: ICD-10-CM

## 2021-09-02 LAB
CARTRIDGE LOT#: ABNORMAL NUMERIC
GLUCOSE BLOOD: 157
HEMOGLOBIN A1C: 6.1 % (ref 4.3–5.6)
TEST STRIP LOT #: NORMAL NUMERIC

## 2021-09-02 PROCEDURE — 76811 OB US DETAILED SNGL FETUS: CPT | Performed by: OBSTETRICS & GYNECOLOGY

## 2021-09-02 PROCEDURE — 83036 HEMOGLOBIN GLYCOSYLATED A1C: CPT | Performed by: INTERNAL MEDICINE

## 2021-09-02 PROCEDURE — 95251 CONT GLUC MNTR ANALYSIS I&R: CPT | Performed by: INTERNAL MEDICINE

## 2021-09-02 PROCEDURE — 99214 OFFICE O/P EST MOD 30 MIN: CPT | Performed by: INTERNAL MEDICINE

## 2021-09-02 PROCEDURE — 3077F SYST BP >= 140 MM HG: CPT | Performed by: INTERNAL MEDICINE

## 2021-09-02 PROCEDURE — 3079F DIAST BP 80-89 MM HG: CPT | Performed by: INTERNAL MEDICINE

## 2021-09-02 RX ORDER — INSULIN ASPART 100 [IU]/ML
INJECTION, SOLUTION INTRAVENOUS; SUBCUTANEOUS
Qty: 15 ML | Refills: 0 | Status: SHIPPED | OUTPATIENT
Start: 2021-09-02 | End: 2021-09-02

## 2021-09-02 RX ORDER — INSULIN ASPART 100 [IU]/ML
INJECTION, SOLUTION INTRAVENOUS; SUBCUTANEOUS
Qty: 15 ML | Refills: 1 | Status: SHIPPED | OUTPATIENT
Start: 2021-09-02 | End: 2021-12-10

## 2021-09-02 NOTE — PROGRESS NOTES
Name: Mario Hoskins  Date: 9/2/2021    Referring Physician: No ref. provider found    HISTORY OF [address] Lianet Hinojosa is a 39year old female who presents for diabetes mellitus.       She has now delivered 3rd baby -  Baby boy [place] Medications:   •  Levothyroxine Sodium 200 MCG Oral Tab, Take 1 tablet (200 mcg total) by mouth before breakfast., Disp: 90 tablet, Rfl: 1  •  Levothyroxine Sodium 50 MCG Oral Tab, Take 1 tablet (50 mcg total) by mouth before breakfast. Daily Along with 20 Rfl: 1  •  ONETOUCH DELICA LANCETS 73V Does not apply Misc, Check 2 times per day, Disp: 200 each, Rfl: 2  •  Glucose Blood (ONETOUCH VERIO) In Vitro Strip, Use to check blood sugar up to 7 times daily, Disp: 200 each, Rfl: 4  •  Insulin Aspart Pen (NOVOLO sclera. , normal sclera and normal pupils  Throat/Neck: normal sound to voice. Back: no kyphosis  Respiratory:  non-labored. no increased work of breathing.     Skin:  normal moisture and skin texture  Hematologic:  no excessive bruising  Psychiatric:  nathaniel

## 2021-09-02 NOTE — TELEPHONE ENCOUNTER
Jose Ding,    Would you be able to advise on this? Current signature: Inject insulin as directed in case of insulin pump failure.

## 2021-09-02 NOTE — TELEPHONE ENCOUNTER
Pharmacy called in stating the patient's dosage information is missing on the prescription please resend Insulin Aspart Pen (NOVOLOG FLEXPEN) 100 UNIT/ML Subcutaneous Solution Pen-injector.  Please follow up

## 2021-09-14 ENCOUNTER — ANCILLARY PROCEDURE (OUTPATIENT)
Dept: MATERNAL FETAL MEDICINE | Age: 36
End: 2021-09-14
Attending: LEGAL MEDICINE

## 2021-09-14 DIAGNOSIS — E66.9 OBESITY: ICD-10-CM

## 2021-09-14 DIAGNOSIS — E10.9 DM TYPE 1 (DIABETES MELLITUS, TYPE 1) (CMD): ICD-10-CM

## 2021-09-14 DIAGNOSIS — E07.9 THYROID DYSFUNCTION: ICD-10-CM

## 2021-09-14 DIAGNOSIS — O09.529 AMA (ADVANCED MATERNAL AGE) MULTIGRAVIDA 35+: ICD-10-CM

## 2021-09-14 PROCEDURE — 93325 DOPPLER ECHO COLOR FLOW MAPG: CPT

## 2021-09-14 PROCEDURE — 93325 DOPPLER ECHO COLOR FLOW MAPG: CPT | Performed by: PEDIATRICS

## 2021-09-14 PROCEDURE — 76825 ECHO EXAM OF FETAL HEART: CPT | Performed by: PEDIATRICS

## 2021-09-14 PROCEDURE — 76827 ECHO EXAM OF FETAL HEART: CPT | Performed by: PEDIATRICS

## 2021-09-14 PROCEDURE — 99241 OFFICE CONSULTATION,LEVEL I: CPT | Performed by: PEDIATRICS

## 2021-09-30 ENCOUNTER — OFFICE VISIT (OUTPATIENT)
Dept: ENDOCRINOLOGY CLINIC | Facility: CLINIC | Age: 36
End: 2021-09-30
Payer: COMMERCIAL

## 2021-09-30 ENCOUNTER — OFFICE VISIT (OUTPATIENT)
Dept: MATERNAL FETAL MEDICINE | Age: 36
End: 2021-09-30
Attending: LEGAL MEDICINE

## 2021-09-30 ENCOUNTER — TELEPHONE (OUTPATIENT)
Dept: ENDOCRINOLOGY CLINIC | Facility: CLINIC | Age: 36
End: 2021-09-30

## 2021-09-30 ENCOUNTER — LAB ENCOUNTER (OUTPATIENT)
Dept: LAB | Facility: HOSPITAL | Age: 36
End: 2021-09-30
Attending: INTERNAL MEDICINE
Payer: COMMERCIAL

## 2021-09-30 VITALS
HEART RATE: 105 BPM | DIASTOLIC BLOOD PRESSURE: 66 MMHG | BODY MASS INDEX: 54 KG/M2 | SYSTOLIC BLOOD PRESSURE: 115 MMHG | WEIGHT: 293 LBS

## 2021-09-30 DIAGNOSIS — E66.01 SEVERE OBESITY (BMI >= 40) (CMD): Primary | ICD-10-CM

## 2021-09-30 DIAGNOSIS — E07.9 THYROID DYSFUNCTION: ICD-10-CM

## 2021-09-30 DIAGNOSIS — E10.9 DM TYPE 1 (DIABETES MELLITUS, TYPE 1) (CMD): ICD-10-CM

## 2021-09-30 DIAGNOSIS — E07.9 ANTEPARTUM THYROID DYSFUNCTION: ICD-10-CM

## 2021-09-30 DIAGNOSIS — E03.9 HYPOTHYROIDISM, UNSPECIFIED TYPE: ICD-10-CM

## 2021-09-30 DIAGNOSIS — O34.219 PREVIOUS CESAREAN DELIVERY, ANTEPARTUM CONDITION OR COMPLICATION: ICD-10-CM

## 2021-09-30 DIAGNOSIS — E66.9 OBESITY: ICD-10-CM

## 2021-09-30 DIAGNOSIS — E11.65 UNCONTROLLED TYPE 2 DIABETES MELLITUS WITH HYPERGLYCEMIA (HCC): Primary | ICD-10-CM

## 2021-09-30 DIAGNOSIS — O09.522 MULTIGRAVIDA OF ADVANCED MATERNAL AGE IN SECOND TRIMESTER: ICD-10-CM

## 2021-09-30 DIAGNOSIS — O99.280 ANTEPARTUM THYROID DYSFUNCTION: ICD-10-CM

## 2021-09-30 DIAGNOSIS — E03.9 HYPOTHYROIDISM, UNSPECIFIED TYPE: Primary | ICD-10-CM

## 2021-09-30 DIAGNOSIS — O09.529 AMA (ADVANCED MATERNAL AGE) MULTIGRAVIDA 35+: ICD-10-CM

## 2021-09-30 DIAGNOSIS — E03.8 HYPOTHYROIDISM DUE TO HASHIMOTO'S THYROIDITIS: ICD-10-CM

## 2021-09-30 DIAGNOSIS — E06.3 HYPOTHYROIDISM DUE TO HASHIMOTO'S THYROIDITIS: ICD-10-CM

## 2021-09-30 DIAGNOSIS — E11.69 TYPE 2 DIABETES MELLITUS WITH OTHER SPECIFIED COMPLICATION, UNSPECIFIED WHETHER LONG TERM INSULIN USE (CMD): ICD-10-CM

## 2021-09-30 LAB
CARTRIDGE LOT#: ABNORMAL NUMERIC
GLUCOSE BLOOD: 97
HEMOGLOBIN A1C: 5.9 % (ref 4.3–5.6)
TEST STRIP LOT #: NORMAL NUMERIC

## 2021-09-30 PROCEDURE — 3044F HG A1C LEVEL LT 7.0%: CPT | Performed by: INTERNAL MEDICINE

## 2021-09-30 PROCEDURE — 3078F DIAST BP <80 MM HG: CPT | Performed by: INTERNAL MEDICINE

## 2021-09-30 PROCEDURE — 95251 CONT GLUC MNTR ANALYSIS I&R: CPT | Performed by: INTERNAL MEDICINE

## 2021-09-30 PROCEDURE — 84439 ASSAY OF FREE THYROXINE: CPT

## 2021-09-30 PROCEDURE — 83036 HEMOGLOBIN GLYCOSYLATED A1C: CPT | Performed by: INTERNAL MEDICINE

## 2021-09-30 PROCEDURE — 84443 ASSAY THYROID STIM HORMONE: CPT

## 2021-09-30 PROCEDURE — 76816 OB US FOLLOW-UP PER FETUS: CPT | Performed by: OBSTETRICS & GYNECOLOGY

## 2021-09-30 PROCEDURE — 3074F SYST BP LT 130 MM HG: CPT | Performed by: INTERNAL MEDICINE

## 2021-09-30 PROCEDURE — 99214 OFFICE O/P EST MOD 30 MIN: CPT | Performed by: INTERNAL MEDICINE

## 2021-09-30 PROCEDURE — 36415 COLL VENOUS BLD VENIPUNCTURE: CPT

## 2021-09-30 PROCEDURE — 36416 COLLJ CAPILLARY BLOOD SPEC: CPT | Performed by: INTERNAL MEDICINE

## 2021-09-30 RX ORDER — ASPIRIN 81 MG/1
81 TABLET ORAL DAILY
COMMUNITY

## 2021-09-30 NOTE — PROGRESS NOTES
Name: Colette Long  Date: 9/30/2021    Referring Physician: No ref. provider found    HISTORY OF [address] [facility] Cherry Kilgore is a 39year old female who presents for diabetes mellitus.       She has now delivered 3rd baby -  Baby boy Eliecer Morejoncristal Medications:   •  aspirin 81 MG Oral Tab EC, Take 81 mg by mouth daily. , Disp: , Rfl:   •  Levothyroxine Sodium 200 MCG Oral Tab, Take 1 tablet (200 mcg total) by mouth before breakfast., Disp: 90 tablet, Rfl: 1  •  Levothyroxine Sodium 50 MCG Oral Tab, Ta MARIA G U/F) 32G X 4 MM Does not apply Misc, Inject once daily, Disp: 100 each, Rfl: 1  •  ONETOUCH DELICA LANCETS 67G Does not apply Misc, Check 2 times per day, Disp: 200 each, Rfl: 2  •  Glucose Blood (ONETOUCH VERIO) In Vitro Strip, Use to check blood sug Back: no kyphosis  Respiratory:  non-labored. no increased work of breathing. Skin:  normal moisture and skin texture  Hematologic:  no excessive bruising  Psychiatric:  oriented to time, self, and place      ASSESSMENT/PLAN:      1.  Diabetes Mellitus

## 2021-10-01 ENCOUNTER — OB CHECK (OUTPATIENT)
Dept: OBGYN | Age: 36
End: 2021-10-01

## 2021-10-01 ENCOUNTER — LAB ENCOUNTER (OUTPATIENT)
Dept: LAB | Facility: HOSPITAL | Age: 36
End: 2021-10-01
Attending: OBSTETRICS & GYNECOLOGY
Payer: COMMERCIAL

## 2021-10-01 VITALS
SYSTOLIC BLOOD PRESSURE: 113 MMHG | BODY MASS INDEX: 54.66 KG/M2 | HEART RATE: 112 BPM | DIASTOLIC BLOOD PRESSURE: 63 MMHG | WEIGHT: 293 LBS

## 2021-10-01 DIAGNOSIS — O09.93 HIGH-RISK PREGNANCY IN THIRD TRIMESTER: Primary | ICD-10-CM

## 2021-10-01 DIAGNOSIS — O09.299 HX OF MATERNAL HELLP SYNDROME, CURRENTLY PREGNANT: ICD-10-CM

## 2021-10-01 DIAGNOSIS — O09.299 HX OF MATERNAL HELLP SYNDROME, CURRENTLY PREGNANT: Primary | ICD-10-CM

## 2021-10-01 DIAGNOSIS — O09.93 SUPERVISION OF HIGH RISK PREGNANCY IN THIRD TRIMESTER: ICD-10-CM

## 2021-10-01 PROCEDURE — 84450 TRANSFERASE (AST) (SGOT): CPT

## 2021-10-01 PROCEDURE — 85027 COMPLETE CBC AUTOMATED: CPT

## 2021-10-01 PROCEDURE — 84156 ASSAY OF PROTEIN URINE: CPT

## 2021-10-01 PROCEDURE — 0502F SUBSEQUENT PRENATAL CARE: CPT | Performed by: OBSTETRICS & GYNECOLOGY

## 2021-10-01 PROCEDURE — 84460 ALANINE AMINO (ALT) (SGPT): CPT

## 2021-10-01 PROCEDURE — 84550 ASSAY OF BLOOD/URIC ACID: CPT

## 2021-10-01 PROCEDURE — 82570 ASSAY OF URINE CREATININE: CPT

## 2021-10-01 PROCEDURE — 36415 COLL VENOUS BLD VENIPUNCTURE: CPT

## 2021-10-06 PROBLEM — O99.012 ANEMIA DURING PREGNANCY IN SECOND TRIMESTER: Status: ACTIVE | Noted: 2021-10-06

## 2021-10-07 ENCOUNTER — PREP FOR CASE (OUTPATIENT)
Dept: OBGYN | Age: 36
End: 2021-10-07

## 2021-10-12 ENCOUNTER — OB CHECK (OUTPATIENT)
Dept: OBGYN | Age: 36
End: 2021-10-12

## 2021-10-12 ENCOUNTER — TELEPHONE (OUTPATIENT)
Dept: OBGYN | Age: 36
End: 2021-10-12

## 2021-10-12 VITALS
WEIGHT: 293 LBS | SYSTOLIC BLOOD PRESSURE: 121 MMHG | DIASTOLIC BLOOD PRESSURE: 72 MMHG | BODY MASS INDEX: 55.18 KG/M2 | HEART RATE: 123 BPM

## 2021-10-12 DIAGNOSIS — O09.93 HIGH-RISK PREGNANCY IN THIRD TRIMESTER: Primary | ICD-10-CM

## 2021-10-12 PROCEDURE — 0502F SUBSEQUENT PRENATAL CARE: CPT | Performed by: LEGAL MEDICINE

## 2021-10-18 RX ORDER — BLOOD SUGAR DIAGNOSTIC
STRIP MISCELLANEOUS
Qty: 400 STRIP | Refills: 1 | Status: SHIPPED | OUTPATIENT
Start: 2021-10-18

## 2021-10-27 ENCOUNTER — OB CHECK (OUTPATIENT)
Dept: OBGYN | Age: 36
End: 2021-10-27

## 2021-10-27 VITALS
SYSTOLIC BLOOD PRESSURE: 140 MMHG | BODY MASS INDEX: 48.82 KG/M2 | HEIGHT: 65 IN | HEART RATE: 118 BPM | DIASTOLIC BLOOD PRESSURE: 87 MMHG | WEIGHT: 293 LBS

## 2021-10-27 DIAGNOSIS — O09.92 SUPERVISION OF HIGH RISK PREGNANCY IN SECOND TRIMESTER: Primary | ICD-10-CM

## 2021-10-27 PROCEDURE — 0502F SUBSEQUENT PRENATAL CARE: CPT | Performed by: OBSTETRICS & GYNECOLOGY

## 2021-10-28 ENCOUNTER — OFFICE VISIT (OUTPATIENT)
Dept: ENDOCRINOLOGY CLINIC | Facility: CLINIC | Age: 36
End: 2021-10-28
Payer: COMMERCIAL

## 2021-10-28 ENCOUNTER — LAB ENCOUNTER (OUTPATIENT)
Dept: LAB | Facility: HOSPITAL | Age: 36
End: 2021-10-28
Attending: INTERNAL MEDICINE
Payer: COMMERCIAL

## 2021-10-28 ENCOUNTER — OFFICE VISIT (OUTPATIENT)
Dept: MATERNAL FETAL MEDICINE | Age: 36
End: 2021-10-28
Attending: FAMILY MEDICINE

## 2021-10-28 VITALS
SYSTOLIC BLOOD PRESSURE: 127 MMHG | BODY MASS INDEX: 56 KG/M2 | HEART RATE: 101 BPM | DIASTOLIC BLOOD PRESSURE: 81 MMHG | WEIGHT: 293 LBS

## 2021-10-28 DIAGNOSIS — E66.01 SEVERE OBESITY (BMI >= 40) (CMD): Primary | ICD-10-CM

## 2021-10-28 DIAGNOSIS — O09.529 AMA (ADVANCED MATERNAL AGE) MULTIGRAVIDA 35+: ICD-10-CM

## 2021-10-28 DIAGNOSIS — E11.65 UNCONTROLLED TYPE 2 DIABETES MELLITUS WITH HYPERGLYCEMIA (HCC): Primary | ICD-10-CM

## 2021-10-28 DIAGNOSIS — E06.3 HYPOTHYROIDISM DUE TO HASHIMOTO'S THYROIDITIS: ICD-10-CM

## 2021-10-28 DIAGNOSIS — E03.8 HYPOTHYROIDISM DUE TO HASHIMOTO'S THYROIDITIS: ICD-10-CM

## 2021-10-28 DIAGNOSIS — E66.9 OBESITY: ICD-10-CM

## 2021-10-28 DIAGNOSIS — E07.9 THYROID DYSFUNCTION: ICD-10-CM

## 2021-10-28 DIAGNOSIS — O24.913 DIABETES MELLITUS AFFECTING PREGNANCY IN THIRD TRIMESTER: ICD-10-CM

## 2021-10-28 DIAGNOSIS — E10.9 DM TYPE 1 (DIABETES MELLITUS, TYPE 1) (CMD): ICD-10-CM

## 2021-10-28 DIAGNOSIS — O34.219 PREVIOUS CESAREAN DELIVERY, ANTEPARTUM CONDITION OR COMPLICATION: ICD-10-CM

## 2021-10-28 PROCEDURE — 36416 COLLJ CAPILLARY BLOOD SPEC: CPT | Performed by: INTERNAL MEDICINE

## 2021-10-28 PROCEDURE — 76816 OB US FOLLOW-UP PER FETUS: CPT | Performed by: OBSTETRICS & GYNECOLOGY

## 2021-10-28 PROCEDURE — 99214 OFFICE O/P EST MOD 30 MIN: CPT | Performed by: INTERNAL MEDICINE

## 2021-10-28 PROCEDURE — 83036 HEMOGLOBIN GLYCOSYLATED A1C: CPT | Performed by: INTERNAL MEDICINE

## 2021-10-28 PROCEDURE — 36415 COLL VENOUS BLD VENIPUNCTURE: CPT

## 2021-10-28 PROCEDURE — 3079F DIAST BP 80-89 MM HG: CPT | Performed by: INTERNAL MEDICINE

## 2021-10-28 PROCEDURE — 3044F HG A1C LEVEL LT 7.0%: CPT | Performed by: INTERNAL MEDICINE

## 2021-10-28 PROCEDURE — 84443 ASSAY THYROID STIM HORMONE: CPT

## 2021-10-28 PROCEDURE — 3074F SYST BP LT 130 MM HG: CPT | Performed by: INTERNAL MEDICINE

## 2021-10-28 PROCEDURE — 95251 CONT GLUC MNTR ANALYSIS I&R: CPT | Performed by: INTERNAL MEDICINE

## 2021-10-28 PROCEDURE — 84439 ASSAY OF FREE THYROXINE: CPT

## 2021-10-28 NOTE — PROGRESS NOTES
Name: Samuel Silva  Date: 9/30/2021    Referring Physician: No ref. provider found    HISTORY OF [address] Danita Ishaan is a 39year old female who presents for diabetes mellitus.       She has now delivered 3rd baby -  Baby boy Daquan Warrenit eating. Medications:     Current Outpatient Medications:   •  ONETOUCH VERIO In Vitro Strip, CHECK 4 TIMES DAILY, Disp: 400 strip, Rfl: 1  •  aspirin 81 MG Oral Tab EC, Take 81 mg by mouth daily. , Disp: , Rfl:   •  Levothyroxine Sodium 200 MCG Oral Tab Rfl: 1  •  Insulin Pen Needle (BD PEN NEEDLE MARIA G U/F) 32G X 4 MM Does not apply Misc, Inject once daily, Disp: 100 each, Rfl: 1  •  ONETOUCH DELICA LANCETS 70L Does not apply Misc, Check 2 times per day, Disp: 200 each, Rfl: 2  •  Glucose Blood (ONETOUCH pupils  Throat/Neck: normal sound to voice. Back: no kyphosis  Respiratory:  non-labored. no increased work of breathing.     Skin:  normal moisture and skin texture  Hematologic:  no excessive bruising  Psychiatric:  oriented to time, self, and place

## 2021-11-05 ENCOUNTER — PATIENT MESSAGE (OUTPATIENT)
Dept: ENDOCRINOLOGY CLINIC | Facility: CLINIC | Age: 36
End: 2021-11-05

## 2021-11-05 RX ORDER — INSULIN HUMAN 500 [IU]/ML
INJECTION, SOLUTION SUBCUTANEOUS
Qty: 80 ML | Refills: 2 | Status: SHIPPED | OUTPATIENT
Start: 2021-11-05

## 2021-11-05 NOTE — TELEPHONE ENCOUNTER
Spoke with [facility]. They state Humulin Rx was transferred to [facility]: [phone] and dispensed 9/25/21. Attempted to call [facility] but on hold for more than 10 minutes.  Sent MyChart to verify amount of units used per day and which pharm

## 2021-11-05 NOTE — TELEPHONE ENCOUNTER
Patient requesting new Humulin Rx. Pended. LOV 10/28/21  FU 12/2/21    Attempted to contact [facility] again, on hold greater than 10 min.

## 2021-11-05 NOTE — TELEPHONE ENCOUNTER
From: Mario Hoskins  To: Ingrid Corbett MD  Sent: 11/5/2021 10:55 AM CDT  Subject: Need updated humulin rx    Hi Dr Liat Roque, the pharmacy said they need an updated prescription for the humulin or it won’t go thru until the 10th I’m on my last 184 units

## 2021-11-17 ENCOUNTER — OB CHECK (OUTPATIENT)
Dept: OBGYN | Age: 36
End: 2021-11-17

## 2021-11-17 VITALS
DIASTOLIC BLOOD PRESSURE: 72 MMHG | SYSTOLIC BLOOD PRESSURE: 129 MMHG | HEIGHT: 65 IN | HEART RATE: 108 BPM | WEIGHT: 293 LBS | BODY MASS INDEX: 48.82 KG/M2

## 2021-11-17 DIAGNOSIS — O09.529 ELDERLY MULTIGRAVIDA WITH ANTEPARTUM CONDITION OR COMPLICATION: ICD-10-CM

## 2021-11-17 DIAGNOSIS — O24.013 PRE-EXISTING TYPE 1 DIABETES MELLITUS DURING PREGNANCY IN THIRD TRIMESTER: Primary | ICD-10-CM

## 2021-11-17 DIAGNOSIS — O99.012 ANEMIA DURING PREGNANCY IN SECOND TRIMESTER: ICD-10-CM

## 2021-11-17 DIAGNOSIS — Z23 NEED FOR TDAP VACCINATION: ICD-10-CM

## 2021-11-17 DIAGNOSIS — O28.8 NON-REACTIVE NST (NON-STRESS TEST): ICD-10-CM

## 2021-11-17 DIAGNOSIS — O09.299: ICD-10-CM

## 2021-11-17 PROCEDURE — 76818 FETAL BIOPHYS PROFILE W/NST: CPT | Performed by: OBSTETRICS & GYNECOLOGY

## 2021-11-17 PROCEDURE — 90471 IMMUNIZATION ADMIN: CPT

## 2021-11-17 PROCEDURE — 90715 TDAP VACCINE 7 YRS/> IM: CPT

## 2021-11-17 PROCEDURE — 0502F SUBSEQUENT PRENATAL CARE: CPT | Performed by: OBSTETRICS & GYNECOLOGY

## 2021-11-21 NOTE — LETTER
9/26/2019              [email or web address]        [facility] [address]         Dear Jen Osorio,    Our records indicate that the tests ordered for you by GAURAV Starks  have not been done.   If you have, in fact, already
21-Nov-2021 11:56

## 2021-11-24 ENCOUNTER — APPOINTMENT (OUTPATIENT)
Dept: MATERNAL FETAL MEDICINE | Age: 36
End: 2021-11-24

## 2021-11-24 ENCOUNTER — LAB SERVICES (OUTPATIENT)
Dept: LAB | Age: 36
End: 2021-11-24

## 2021-11-24 ENCOUNTER — OFFICE VISIT (OUTPATIENT)
Dept: MATERNAL FETAL MEDICINE | Age: 36
End: 2021-11-24
Attending: LEGAL MEDICINE

## 2021-11-24 DIAGNOSIS — O09.529 ELDERLY MULTIGRAVIDA WITH ANTEPARTUM CONDITION OR COMPLICATION: ICD-10-CM

## 2021-11-24 DIAGNOSIS — O09.93 HIGH-RISK PREGNANCY IN THIRD TRIMESTER: ICD-10-CM

## 2021-11-24 DIAGNOSIS — E66.01 SEVERE OBESITY (BMI >= 40) (CMD): ICD-10-CM

## 2021-11-24 DIAGNOSIS — E10.9 TYPE 1 DIABETES MELLITUS WITHOUT COMPLICATION (CMD): Primary | ICD-10-CM

## 2021-11-24 DIAGNOSIS — O99.012 ANEMIA DURING PREGNANCY IN SECOND TRIMESTER: ICD-10-CM

## 2021-11-24 DIAGNOSIS — O09.299 HX OF MATERNAL HELLP SYNDROME, CURRENTLY PREGNANT: ICD-10-CM

## 2021-11-24 DIAGNOSIS — O09.299: ICD-10-CM

## 2021-11-24 DIAGNOSIS — O24.013 PRE-EXISTING TYPE 1 DIABETES MELLITUS DURING PREGNANCY IN THIRD TRIMESTER: ICD-10-CM

## 2021-11-24 DIAGNOSIS — O09.523 MULTIGRAVIDA OF ADVANCED MATERNAL AGE IN THIRD TRIMESTER: ICD-10-CM

## 2021-11-24 PROBLEM — D69.6 THROMBOCYTOPENIA (CMD): Status: ACTIVE | Noted: 2021-11-24

## 2021-11-24 LAB
ALBUMIN SERPL-MCNC: 2.5 G/DL (ref 3.6–5.1)
ALBUMIN/GLOB SERPL: 0.6 {RATIO} (ref 1–2.4)
ALP SERPL-CCNC: 67 UNITS/L (ref 45–117)
ALT SERPL-CCNC: 17 UNITS/L
ANION GAP SERPL CALC-SCNC: 11 MMOL/L (ref 10–20)
AST SERPL-CCNC: 12 UNITS/L
BILIRUB SERPL-MCNC: 0.2 MG/DL (ref 0.2–1)
BUN SERPL-MCNC: 10 MG/DL (ref 6–20)
BUN/CREAT SERPL: 18 (ref 7–25)
CALCIUM SERPL-MCNC: 9.4 MG/DL (ref 8.4–10.2)
CHLORIDE SERPL-SCNC: 103 MMOL/L (ref 98–107)
CO2 SERPL-SCNC: 25 MMOL/L (ref 21–32)
CREAT SERPL-MCNC: 0.56 MG/DL (ref 0.51–0.95)
CREAT UR-MCNC: 198 MG/DL
DEPRECATED RDW RBC: 48.1 FL (ref 39–50)
ERYTHROCYTE [DISTWIDTH] IN BLOOD: 14.9 % (ref 11–15)
FASTING DURATION TIME PATIENT: 0 HOURS (ref 0–999)
GFR SERPLBLD BASED ON 1.73 SQ M-ARVRAT: >90 ML/MIN
GLOBULIN SER-MCNC: 4.2 G/DL (ref 2–4)
GLUCOSE SERPL-MCNC: 199 MG/DL (ref 70–99)
HBA1C MFR BLD: 6.2 % (ref 4.5–5.6)
HCT VFR BLD CALC: 35.5 % (ref 36–46.5)
HGB BLD-MCNC: 11.4 G/DL (ref 12–15.5)
HIV 1+2 AB+HIV1 P24 AG SERPL QL IA: NONREACTIVE
MCH RBC QN AUTO: 28.1 PG (ref 26–34)
MCHC RBC AUTO-ENTMCNC: 32.1 G/DL (ref 32–36.5)
MCV RBC AUTO: 87.4 FL (ref 78–100)
NRBC BLD MANUAL-RTO: 0 /100 WBC
PLATELET # BLD AUTO: 144 K/MCL (ref 140–450)
POTASSIUM SERPL-SCNC: 3.7 MMOL/L (ref 3.4–5.1)
PROT SERPL-MCNC: 6.7 G/DL (ref 6.4–8.2)
PROT UR-MCNC: 30 MG/DL
PROT/CREAT UR: 152 MGPR/GCR
RAINBOW EXTRA TUBES HOLD SPECIMEN: NORMAL
RBC # BLD: 4.06 MIL/MCL (ref 4–5.2)
SODIUM SERPL-SCNC: 135 MMOL/L (ref 135–145)
URATE SERPL-MCNC: 4.6 MG/DL (ref 2.6–5.9)
WBC # BLD: 8 K/MCL (ref 4.2–11)

## 2021-11-24 PROCEDURE — 86780 TREPONEMA PALLIDUM: CPT | Performed by: PSYCHIATRY & NEUROLOGY

## 2021-11-24 PROCEDURE — 76816 OB US FOLLOW-UP PER FETUS: CPT | Performed by: OBSTETRICS & GYNECOLOGY

## 2021-11-24 PROCEDURE — 87389 HIV-1 AG W/HIV-1&-2 AB AG IA: CPT | Performed by: PSYCHIATRY & NEUROLOGY

## 2021-11-24 PROCEDURE — 85027 COMPLETE CBC AUTOMATED: CPT | Performed by: PSYCHIATRY & NEUROLOGY

## 2021-11-24 PROCEDURE — 36415 COLL VENOUS BLD VENIPUNCTURE: CPT | Performed by: PSYCHIATRY & NEUROLOGY

## 2021-11-24 PROCEDURE — 86593 SYPHILIS TEST NON-TREP QUANT: CPT | Performed by: PSYCHIATRY & NEUROLOGY

## 2021-11-24 PROCEDURE — 86592 SYPHILIS TEST NON-TREP QUAL: CPT | Performed by: PSYCHIATRY & NEUROLOGY

## 2021-11-24 PROCEDURE — 84156 ASSAY OF PROTEIN URINE: CPT | Performed by: PSYCHIATRY & NEUROLOGY

## 2021-11-24 PROCEDURE — 82570 ASSAY OF URINE CREATININE: CPT | Performed by: PSYCHIATRY & NEUROLOGY

## 2021-11-24 PROCEDURE — 83036 HEMOGLOBIN GLYCOSYLATED A1C: CPT | Performed by: PSYCHIATRY & NEUROLOGY

## 2021-11-24 PROCEDURE — 84550 ASSAY OF BLOOD/URIC ACID: CPT | Performed by: PSYCHIATRY & NEUROLOGY

## 2021-11-24 PROCEDURE — 80053 COMPREHEN METABOLIC PANEL: CPT | Performed by: PSYCHIATRY & NEUROLOGY

## 2021-11-25 LAB
RPR SER QL: REACTIVE
RPR SER-TITR: ABNORMAL {TITER}

## 2021-11-26 PROBLEM — R76.8 BIOLOGICAL FALSE POSITIVE RPR TEST: Status: ACTIVE | Noted: 2021-11-26

## 2021-11-26 LAB — T PALLIDUM IGG SER QL IA: NONREACTIVE

## 2021-12-01 ENCOUNTER — OB CHECK (OUTPATIENT)
Dept: OBGYN | Age: 36
End: 2021-12-01

## 2021-12-01 VITALS
DIASTOLIC BLOOD PRESSURE: 80 MMHG | HEART RATE: 70 BPM | SYSTOLIC BLOOD PRESSURE: 132 MMHG | BODY MASS INDEX: 57.01 KG/M2 | WEIGHT: 293 LBS

## 2021-12-01 DIAGNOSIS — D69.6 THROMBOCYTOPENIA (CMD): ICD-10-CM

## 2021-12-01 DIAGNOSIS — O09.299: ICD-10-CM

## 2021-12-01 DIAGNOSIS — O99.013 ANEMIA DURING PREGNANCY IN THIRD TRIMESTER: ICD-10-CM

## 2021-12-01 DIAGNOSIS — O09.529 ELDERLY MULTIGRAVIDA WITH ANTEPARTUM CONDITION OR COMPLICATION: ICD-10-CM

## 2021-12-01 DIAGNOSIS — O09.93 HIGH-RISK PREGNANCY IN THIRD TRIMESTER: Primary | ICD-10-CM

## 2021-12-01 DIAGNOSIS — R76.8 BIOLOGICAL FALSE POSITIVE RPR TEST: ICD-10-CM

## 2021-12-01 PROCEDURE — 76815 OB US LIMITED FETUS(S): CPT | Performed by: OBSTETRICS & GYNECOLOGY

## 2021-12-01 PROCEDURE — 59025 FETAL NON-STRESS TEST: CPT | Performed by: OBSTETRICS & GYNECOLOGY

## 2021-12-01 PROCEDURE — 0502F SUBSEQUENT PRENATAL CARE: CPT | Performed by: OBSTETRICS & GYNECOLOGY

## 2021-12-02 ENCOUNTER — LAB ENCOUNTER (OUTPATIENT)
Dept: LAB | Facility: HOSPITAL | Age: 36
End: 2021-12-02
Attending: INTERNAL MEDICINE
Payer: COMMERCIAL

## 2021-12-02 ENCOUNTER — OFFICE VISIT (OUTPATIENT)
Dept: ENDOCRINOLOGY CLINIC | Facility: CLINIC | Age: 36
End: 2021-12-02
Payer: COMMERCIAL

## 2021-12-02 VITALS
BODY MASS INDEX: 58 KG/M2 | DIASTOLIC BLOOD PRESSURE: 79 MMHG | SYSTOLIC BLOOD PRESSURE: 140 MMHG | WEIGHT: 293 LBS | HEART RATE: 105 BPM

## 2021-12-02 DIAGNOSIS — E11.65 UNCONTROLLED TYPE 2 DIABETES MELLITUS WITH HYPERGLYCEMIA (HCC): Primary | ICD-10-CM

## 2021-12-02 DIAGNOSIS — E03.9 HYPOTHYROIDISM, UNSPECIFIED TYPE: ICD-10-CM

## 2021-12-02 PROCEDURE — 84443 ASSAY THYROID STIM HORMONE: CPT

## 2021-12-02 PROCEDURE — 3078F DIAST BP <80 MM HG: CPT | Performed by: INTERNAL MEDICINE

## 2021-12-02 PROCEDURE — 99214 OFFICE O/P EST MOD 30 MIN: CPT | Performed by: INTERNAL MEDICINE

## 2021-12-02 PROCEDURE — 95251 CONT GLUC MNTR ANALYSIS I&R: CPT | Performed by: INTERNAL MEDICINE

## 2021-12-02 PROCEDURE — 3077F SYST BP >= 140 MM HG: CPT | Performed by: INTERNAL MEDICINE

## 2021-12-02 PROCEDURE — 36416 COLLJ CAPILLARY BLOOD SPEC: CPT | Performed by: INTERNAL MEDICINE

## 2021-12-02 PROCEDURE — 36415 COLL VENOUS BLD VENIPUNCTURE: CPT

## 2021-12-02 PROCEDURE — 84439 ASSAY OF FREE THYROXINE: CPT

## 2021-12-03 NOTE — PROGRESS NOTES
Name: Aydee Borjas  Date: 12/2/2021    Referring Physician: No ref. provider found    HISTORY OF [address] Tammy Paul is a 39year old female who presents for diabetes mellitus.       She has now delivered 3rd baby -  Baby boy Kym Marilyn AM and waiting 30-60 min before eating.      Medications:     Current Outpatient Medications:   •  insulin regular human, conc, (HUMULIN R U-500, CONCENTRATED,) 500 UNIT/ML Subcutaneous Solution, Inject up to 300 units daily via insulin pump, Disp: 80 mL, R each, Rfl: 1  •  Insulin Pen Needle (BD PEN NEEDLE MARIA G U/F) 32G X 4 MM Does not apply Misc, Inject once daily, Disp: 100 each, Rfl: 1  •  ONETOUCH DELICA LANCETS 42D Does not apply Misc, Check 2 times per day, Disp: 200 each, Rfl: 2  •  Glucose Blood (ONE pupils  Throat/Neck: normal sound to voice. Back: no kyphosis  Respiratory:  non-labored. no increased work of breathing.     Skin:  normal moisture and skin texture  Hematologic:  no excessive bruising  Psychiatric:  oriented to time, self, and place

## 2021-12-03 NOTE — PATIENT INSTRUCTIONS
New Pump Settings:     Settings:  12A 0.7  4A 0.6  8A 0.4  1P 0.7  9P 0.7    I:CR   12A 10  12P 10    CF 60

## 2021-12-08 ENCOUNTER — OB CHECK (OUTPATIENT)
Dept: OBGYN | Age: 36
End: 2021-12-08

## 2021-12-08 VITALS
WEIGHT: 293 LBS | HEART RATE: 89 BPM | SYSTOLIC BLOOD PRESSURE: 120 MMHG | DIASTOLIC BLOOD PRESSURE: 64 MMHG | BODY MASS INDEX: 57.27 KG/M2

## 2021-12-08 DIAGNOSIS — D69.6 THROMBOCYTOPENIA (CMD): ICD-10-CM

## 2021-12-08 DIAGNOSIS — R76.8 BIOLOGICAL FALSE POSITIVE RPR TEST: ICD-10-CM

## 2021-12-08 DIAGNOSIS — O99.013 ANEMIA DURING PREGNANCY IN THIRD TRIMESTER: ICD-10-CM

## 2021-12-08 DIAGNOSIS — O09.93 HIGH-RISK PREGNANCY IN THIRD TRIMESTER: Primary | ICD-10-CM

## 2021-12-08 PROCEDURE — 0502F SUBSEQUENT PRENATAL CARE: CPT | Performed by: OBSTETRICS & GYNECOLOGY

## 2021-12-08 PROCEDURE — 59025 FETAL NON-STRESS TEST: CPT | Performed by: OBSTETRICS & GYNECOLOGY

## 2021-12-08 PROCEDURE — 76815 OB US LIMITED FETUS(S): CPT | Performed by: OBSTETRICS & GYNECOLOGY

## 2021-12-10 RX ORDER — INSULIN ASPART 100 [IU]/ML
INJECTION, SOLUTION INTRAVENOUS; SUBCUTANEOUS
Qty: 18 ML | Refills: 1 | Status: SHIPPED | OUTPATIENT
Start: 2021-12-10

## 2021-12-15 ENCOUNTER — OB CHECK (OUTPATIENT)
Dept: OBGYN | Age: 36
End: 2021-12-15

## 2021-12-15 VITALS
WEIGHT: 293 LBS | HEIGHT: 65 IN | SYSTOLIC BLOOD PRESSURE: 132 MMHG | DIASTOLIC BLOOD PRESSURE: 80 MMHG | HEART RATE: 110 BPM | BODY MASS INDEX: 48.82 KG/M2

## 2021-12-15 DIAGNOSIS — Z36.85 SCREENING, ANTENATAL, FOR STREPTOCOCCUS B: ICD-10-CM

## 2021-12-15 DIAGNOSIS — R76.8 BIOLOGICAL FALSE POSITIVE RPR TEST: ICD-10-CM

## 2021-12-15 DIAGNOSIS — O09.93 SUPERVISION OF HIGH RISK PREGNANCY IN THIRD TRIMESTER: Primary | ICD-10-CM

## 2021-12-15 DIAGNOSIS — O99.013 ANEMIA DURING PREGNANCY IN THIRD TRIMESTER: ICD-10-CM

## 2021-12-15 DIAGNOSIS — D69.6 THROMBOCYTOPENIA (CMD): ICD-10-CM

## 2021-12-15 PROCEDURE — 0502F SUBSEQUENT PRENATAL CARE: CPT | Performed by: OBSTETRICS & GYNECOLOGY

## 2021-12-15 PROCEDURE — 87081 CULTURE SCREEN ONLY: CPT | Performed by: PSYCHIATRY & NEUROLOGY

## 2021-12-15 PROCEDURE — 76818 FETAL BIOPHYS PROFILE W/NST: CPT | Performed by: OBSTETRICS & GYNECOLOGY

## 2021-12-18 DIAGNOSIS — Z01.812 PRE-PROCEDURAL LABORATORY EXAMINATION: Primary | ICD-10-CM

## 2021-12-18 LAB — GP B STREP SPEC QL CULT: NORMAL

## 2021-12-18 RX ORDER — PNV NO.95/FERROUS FUM/FOLIC AC 28MG-0.8MG
1 TABLET ORAL DAILY
Status: ON HOLD | COMMUNITY
End: 2021-12-31 | Stop reason: HOSPADM

## 2021-12-20 ENCOUNTER — OB CHECK (OUTPATIENT)
Dept: OBGYN | Age: 36
End: 2021-12-20

## 2021-12-20 VITALS
WEIGHT: 293 LBS | DIASTOLIC BLOOD PRESSURE: 90 MMHG | HEIGHT: 65 IN | HEART RATE: 118 BPM | SYSTOLIC BLOOD PRESSURE: 131 MMHG | BODY MASS INDEX: 48.82 KG/M2

## 2021-12-20 DIAGNOSIS — D69.6 THROMBOCYTOPENIA (CMD): ICD-10-CM

## 2021-12-20 DIAGNOSIS — O09.93 SUPERVISION OF HIGH RISK PREGNANCY IN THIRD TRIMESTER: Primary | ICD-10-CM

## 2021-12-20 DIAGNOSIS — O99.013 ANEMIA DURING PREGNANCY IN THIRD TRIMESTER: ICD-10-CM

## 2021-12-20 DIAGNOSIS — R76.8 BIOLOGICAL FALSE POSITIVE RPR TEST: ICD-10-CM

## 2021-12-20 PROCEDURE — 0502F SUBSEQUENT PRENATAL CARE: CPT | Performed by: LEGAL MEDICINE

## 2021-12-20 PROCEDURE — 59025 FETAL NON-STRESS TEST: CPT | Performed by: LEGAL MEDICINE

## 2021-12-20 PROCEDURE — 76815 OB US LIMITED FETUS(S): CPT | Performed by: LEGAL MEDICINE

## 2021-12-22 ENCOUNTER — OFFICE VISIT (OUTPATIENT)
Dept: MATERNAL FETAL MEDICINE | Age: 36
End: 2021-12-22
Attending: LEGAL MEDICINE

## 2021-12-22 DIAGNOSIS — E10.9 DM TYPE 1 (DIABETES MELLITUS, TYPE 1) (CMD): ICD-10-CM

## 2021-12-22 DIAGNOSIS — E66.01 SEVERE OBESITY (BMI >= 40) (CMD): ICD-10-CM

## 2021-12-22 DIAGNOSIS — E07.9 ANTEPARTUM THYROID DYSFUNCTION: ICD-10-CM

## 2021-12-22 DIAGNOSIS — O09.529 AMA (ADVANCED MATERNAL AGE) MULTIGRAVIDA 35+: ICD-10-CM

## 2021-12-22 DIAGNOSIS — O34.219 PREVIOUS CESAREAN DELIVERY, ANTEPARTUM CONDITION OR COMPLICATION: ICD-10-CM

## 2021-12-22 DIAGNOSIS — O24.913 DIABETES MELLITUS AFFECTING PREGNANCY IN THIRD TRIMESTER: Primary | ICD-10-CM

## 2021-12-22 DIAGNOSIS — O99.280 ANTEPARTUM THYROID DYSFUNCTION: ICD-10-CM

## 2021-12-22 DIAGNOSIS — O09.523 MULTIGRAVIDA OF ADVANCED MATERNAL AGE IN THIRD TRIMESTER: ICD-10-CM

## 2021-12-22 DIAGNOSIS — E07.9 THYROID DYSFUNCTION: ICD-10-CM

## 2021-12-22 DIAGNOSIS — E66.9 OBESITY: ICD-10-CM

## 2021-12-22 PROCEDURE — 76816 OB US FOLLOW-UP PER FETUS: CPT | Performed by: OBSTETRICS & GYNECOLOGY

## 2021-12-24 ENCOUNTER — LAB SERVICES (OUTPATIENT)
Dept: LAB | Age: 36
End: 2021-12-24

## 2021-12-24 DIAGNOSIS — Z01.812 PRE-PROCEDURAL LABORATORY EXAMINATION: ICD-10-CM

## 2021-12-24 LAB
ABO + RH BLD: NORMAL
BLD GP AB SCN SERPL QL GEL: NEGATIVE
TYPE AND SCREEN EXPIRATION DATE: NORMAL

## 2021-12-24 PROCEDURE — 86850 RBC ANTIBODY SCREEN: CPT | Performed by: PSYCHIATRY & NEUROLOGY

## 2021-12-24 PROCEDURE — 86900 BLOOD TYPING SEROLOGIC ABO: CPT | Performed by: PSYCHIATRY & NEUROLOGY

## 2021-12-24 PROCEDURE — 36415 COLL VENOUS BLD VENIPUNCTURE: CPT | Performed by: PSYCHIATRY & NEUROLOGY

## 2021-12-24 PROCEDURE — U0003 INFECTIOUS AGENT DETECTION BY NUCLEIC ACID (DNA OR RNA); SEVERE ACUTE RESPIRATORY SYNDROME CORONAVIRUS 2 (SARS-COV-2) (CORONAVIRUS DISEASE [COVID-19]), AMPLIFIED PROBE TECHNIQUE, MAKING USE OF HIGH THROUGHPUT TECHNOLOGIES AS DESCRIBED BY CMS-2020-01-R: HCPCS

## 2021-12-24 PROCEDURE — 86901 BLOOD TYPING SEROLOGIC RH(D): CPT | Performed by: PSYCHIATRY & NEUROLOGY

## 2021-12-25 LAB
SARS-COV-2 RNA RESP QL NAA+PROBE: NOT DETECTED
SERVICE CMNT-IMP: NORMAL
SERVICE CMNT-IMP: NORMAL

## 2021-12-27 ENCOUNTER — ANESTHESIA EVENT (OUTPATIENT)
Dept: OBGYN | Age: 36
End: 2021-12-27

## 2021-12-27 ENCOUNTER — HOSPITAL ENCOUNTER (INPATIENT)
Age: 36
LOS: 4 days | Discharge: HOME | End: 2021-12-31
Attending: OBSTETRICS & GYNECOLOGY | Admitting: OBSTETRICS & GYNECOLOGY

## 2021-12-27 ENCOUNTER — ANESTHESIA (OUTPATIENT)
Dept: OBGYN | Age: 36
End: 2021-12-27

## 2021-12-27 DIAGNOSIS — O09.90 HIGH RISK PREGNANCY, ANTEPARTUM: ICD-10-CM

## 2021-12-27 DIAGNOSIS — R63.30 FEEDING DIFFICULTIES: Primary | ICD-10-CM

## 2021-12-27 DIAGNOSIS — Z98.891 STATUS POST REPEAT LOW TRANSVERSE CESAREAN SECTION: ICD-10-CM

## 2021-12-27 LAB
ALBUMIN SERPL-MCNC: 2.1 G/DL (ref 3.6–5.1)
ALBUMIN/GLOB SERPL: 0.5 {RATIO} (ref 1–2.4)
ALP SERPL-CCNC: 82 UNITS/L (ref 45–117)
ALT SERPL-CCNC: 16 UNITS/L
ANION GAP SERPL CALC-SCNC: 11 MMOL/L (ref 10–20)
AST SERPL-CCNC: 22 UNITS/L
BASE EXCESS / DEFICIT, ARTERIAL CORD BLOOD - RESPIRATORY: 1 MMOL/L
BASE EXCESS / DEFICIT, VENOUS CORD BLOOD - RESPIRATORY: 1 MMOL/L
BASOPHILS # BLD: 0 K/MCL (ref 0–0.3)
BASOPHILS NFR BLD: 1 %
BILIRUB SERPL-MCNC: 0.2 MG/DL (ref 0.2–1)
BUN SERPL-MCNC: 8 MG/DL (ref 6–20)
BUN/CREAT SERPL: 14 (ref 7–25)
CALCIUM SERPL-MCNC: 9.5 MG/DL (ref 8.4–10.2)
CHLORIDE SERPL-SCNC: 104 MMOL/L (ref 98–107)
CO2 SERPL-SCNC: 24 MMOL/L (ref 21–32)
CONDITION: ABNORMAL
CONDITION: ABNORMAL
CREAT SERPL-MCNC: 0.58 MG/DL (ref 0.51–0.95)
DEPRECATED RDW RBC: 45.5 FL (ref 39–50)
EOSINOPHIL # BLD: 0.1 K/MCL (ref 0–0.5)
EOSINOPHIL NFR BLD: 2 %
ERYTHROCYTE [DISTWIDTH] IN BLOOD: 14.7 % (ref 11–15)
FASTING DURATION TIME PATIENT: ABNORMAL H
GFR SERPLBLD BASED ON 1.73 SQ M-ARVRAT: >90 ML/MIN
GLOBULIN SER-MCNC: 4.3 G/DL (ref 2–4)
GLUCOSE BLDC GLUCOMTR-MCNC: 111 MG/DL (ref 70–99)
GLUCOSE BLDC GLUCOMTR-MCNC: 119 MG/DL (ref 70–99)
GLUCOSE BLDC GLUCOMTR-MCNC: 122 MG/DL (ref 70–99)
GLUCOSE SERPL-MCNC: 106 MG/DL (ref 70–99)
HCO3 BLDCOA-SCNC: 31 MMOL/L (ref 21–28)
HCO3 BLDCOV-SCNC: 29 MMOL/L (ref 22–29)
HCT VFR BLD CALC: 36.1 % (ref 36–46.5)
HGB BLD-MCNC: 11.7 G/DL (ref 12–15.5)
IMM GRANULOCYTES # BLD AUTO: 0.1 K/MCL (ref 0–0.2)
IMM GRANULOCYTES # BLD: 1 %
LYMPHOCYTES # BLD: 1.7 K/MCL (ref 1–4.8)
LYMPHOCYTES NFR BLD: 21 %
MCH RBC QN AUTO: 27.5 PG (ref 26–34)
MCHC RBC AUTO-ENTMCNC: 32.4 G/DL (ref 32–36.5)
MCV RBC AUTO: 84.9 FL (ref 78–100)
MONOCYTES # BLD: 0.6 K/MCL (ref 0.3–0.9)
MONOCYTES NFR BLD: 8 %
NEUTROPHILS # BLD: 5.3 K/MCL (ref 1.8–7.7)
NEUTROPHILS NFR BLD: 67 %
NRBC BLD MANUAL-RTO: 0 /100 WBC
PCO2 BLDCOA: 82 MM HG (ref 31–74)
PCO2 BLDCOV: 65 MM HG (ref 23–49)
PH BLDCOA: 7.19 UNITS (ref 7.18–7.38)
PH BLDCOV: 7.26 UNITS (ref 7.25–7.45)
PLATELET # BLD AUTO: 147 K/MCL (ref 140–450)
PO2 BLDCOA: <20 MM HG (ref 6–31)
PO2 BLDCOV: <20 MM HG (ref 17–41)
POTASSIUM SERPL-SCNC: 4.2 MMOL/L (ref 3.4–5.1)
PROT SERPL-MCNC: 6.4 G/DL (ref 6.4–8.2)
RBC # BLD: 4.25 MIL/MCL (ref 4–5.2)
SODIUM SERPL-SCNC: 135 MMOL/L (ref 135–145)
WBC # BLD: 7.9 K/MCL (ref 4.2–11)

## 2021-12-27 PROCEDURE — 10000003 HB ROOM CHARGE WOMEN'S HEALTH

## 2021-12-27 PROCEDURE — 10002801 HB RX 250 W/O HCPCS: Performed by: OBSTETRICS & GYNECOLOGY

## 2021-12-27 PROCEDURE — 10004281 HB COUNTER-STAFF TIME PER 15 MIN

## 2021-12-27 PROCEDURE — 10004651 HB RX, NO CHARGE ITEM: Performed by: OBSTETRICS & GYNECOLOGY

## 2021-12-27 PROCEDURE — 10002803 HB RX 637: Performed by: OBSTETRICS & GYNECOLOGY

## 2021-12-27 PROCEDURE — X1094 NO CHARGE VISIT: HCPCS | Performed by: LEGAL MEDICINE

## 2021-12-27 PROCEDURE — 10002807 HB RX 258: Performed by: ANESTHESIOLOGY

## 2021-12-27 PROCEDURE — 13000012 HB ANESTHESIA SPINAL/EPIDURAL IN L&D: Performed by: OBSTETRICS & GYNECOLOGY

## 2021-12-27 PROCEDURE — 10002807 HB RX 258: Performed by: OBSTETRICS & GYNECOLOGY

## 2021-12-27 PROCEDURE — 13000036 HB COMPLEX  CASE S/U + 1ST 15 MIN: Performed by: OBSTETRICS & GYNECOLOGY

## 2021-12-27 PROCEDURE — X1094 NO CHARGE VISIT: HCPCS | Performed by: OBSTETRICS & GYNECOLOGY

## 2021-12-27 PROCEDURE — 59510 CESAREAN DELIVERY: CPT | Performed by: OBSTETRICS & GYNECOLOGY

## 2021-12-27 PROCEDURE — S9445 PT EDUCATION NOC INDIVID: HCPCS

## 2021-12-27 PROCEDURE — 82803 BLOOD GASES ANY COMBINATION: CPT

## 2021-12-27 PROCEDURE — 10002800 HB RX 250 W HCPCS: Performed by: OBSTETRICS & GYNECOLOGY

## 2021-12-27 PROCEDURE — 13000001 HB PHASE II RECOVERY EA 30 MINUTES: Performed by: OBSTETRICS & GYNECOLOGY

## 2021-12-27 PROCEDURE — 13000037 HB COMPLEX CASE EACH ADD MINUTE: Performed by: OBSTETRICS & GYNECOLOGY

## 2021-12-27 PROCEDURE — 36415 COLL VENOUS BLD VENIPUNCTURE: CPT | Performed by: OBSTETRICS & GYNECOLOGY

## 2021-12-27 PROCEDURE — 10002800 HB RX 250 W HCPCS: Performed by: ANESTHESIOLOGY

## 2021-12-27 PROCEDURE — 88307 TISSUE EXAM BY PATHOLOGIST: CPT | Performed by: OBSTETRICS & GYNECOLOGY

## 2021-12-27 PROCEDURE — 85025 COMPLETE CBC W/AUTO DIFF WBC: CPT | Performed by: OBSTETRICS & GYNECOLOGY

## 2021-12-27 PROCEDURE — 10006023 HB SUPPLY 272: Performed by: OBSTETRICS & GYNECOLOGY

## 2021-12-27 PROCEDURE — 13001455 HB PERINATAL CARE HIGH RISK

## 2021-12-27 PROCEDURE — 80053 COMPREHEN METABOLIC PANEL: CPT | Performed by: OBSTETRICS & GYNECOLOGY

## 2021-12-27 PROCEDURE — 59514 CESAREAN DELIVERY ONLY: CPT | Performed by: LEGAL MEDICINE

## 2021-12-27 RX ORDER — HYDRALAZINE HYDROCHLORIDE 20 MG/ML
5 INJECTION INTRAMUSCULAR; INTRAVENOUS EVERY 10 MIN PRN
Status: DISCONTINUED | OUTPATIENT
Start: 2021-12-27 | End: 2021-12-27

## 2021-12-27 RX ORDER — ONDANSETRON 2 MG/ML
4 INJECTION INTRAMUSCULAR; INTRAVENOUS EVERY 12 HOURS PRN
Status: DISCONTINUED | OUTPATIENT
Start: 2021-12-27 | End: 2021-12-31 | Stop reason: HOSPADM

## 2021-12-27 RX ORDER — MIDAZOLAM HYDROCHLORIDE 1 MG/ML
INJECTION, SOLUTION INTRAMUSCULAR; INTRAVENOUS PRN
Status: DISCONTINUED | OUTPATIENT
Start: 2021-12-27 | End: 2021-12-27

## 2021-12-27 RX ORDER — NALOXONE HCL 0.4 MG/ML
0.2 VIAL (ML) INJECTION EVERY 5 MIN PRN
Status: DISCONTINUED | OUTPATIENT
Start: 2021-12-27 | End: 2021-12-27

## 2021-12-27 RX ORDER — ACETAMINOPHEN 325 MG/1
650 TABLET ORAL EVERY 4 HOURS PRN
Status: DISCONTINUED | OUTPATIENT
Start: 2021-12-27 | End: 2021-12-27

## 2021-12-27 RX ORDER — AMOXICILLIN 250 MG
2 CAPSULE ORAL DAILY PRN
Status: DISCONTINUED | OUTPATIENT
Start: 2021-12-27 | End: 2021-12-31 | Stop reason: HOSPADM

## 2021-12-27 RX ORDER — ACETAMINOPHEN 500 MG
1000 TABLET ORAL EVERY 8 HOURS SCHEDULED
Status: DISCONTINUED | OUTPATIENT
Start: 2021-12-27 | End: 2021-12-31 | Stop reason: HOSPADM

## 2021-12-27 RX ORDER — SIMETHICONE 125 MG
125 TABLET,CHEWABLE ORAL 4 TIMES DAILY PRN
Status: DISCONTINUED | OUTPATIENT
Start: 2021-12-27 | End: 2021-12-31 | Stop reason: HOSPADM

## 2021-12-27 RX ORDER — SODIUM CHLORIDE, SODIUM LACTATE, POTASSIUM CHLORIDE, CALCIUM CHLORIDE 600; 310; 30; 20 MG/100ML; MG/100ML; MG/100ML; MG/100ML
INJECTION, SOLUTION INTRAVENOUS CONTINUOUS PRN
Status: DISCONTINUED | OUTPATIENT
Start: 2021-12-27 | End: 2021-12-27

## 2021-12-27 RX ORDER — VITAMIN A, VITAMIN C, VITAMIN D-3, VITAMIN E, VITAMIN B-1, VITAMIN B-2, NIACIN, VITAMIN B-6, CALCIUM, IRON, ZINC, COPPER 4000; 120; 400; 22; 1.84; 3; 20; 10; 1; 12; 200; 27; 25; 2 [IU]/1; MG/1; [IU]/1; MG/1; MG/1; MG/1; MG/1; MG/1; MG/1; UG/1; MG/1; MG/1; MG/1; MG/1
1 TABLET ORAL DAILY
Status: DISCONTINUED | OUTPATIENT
Start: 2021-12-27 | End: 2021-12-31 | Stop reason: HOSPADM

## 2021-12-27 RX ORDER — 0.9 % SODIUM CHLORIDE 0.9 %
2 VIAL (ML) INJECTION EVERY 12 HOURS SCHEDULED
Status: DISCONTINUED | OUTPATIENT
Start: 2021-12-27 | End: 2021-12-27

## 2021-12-27 RX ORDER — IBUPROFEN 600 MG/1
600 TABLET ORAL EVERY 6 HOURS SCHEDULED
Status: DISCONTINUED | OUTPATIENT
Start: 2021-12-28 | End: 2021-12-31 | Stop reason: HOSPADM

## 2021-12-27 RX ORDER — ALBUTEROL SULFATE 2.5 MG/3ML
5 SOLUTION RESPIRATORY (INHALATION) ONCE
Status: DISCONTINUED | OUTPATIENT
Start: 2021-12-27 | End: 2021-12-27

## 2021-12-27 RX ORDER — OXYTOCIN-SODIUM CHLORIDE 0.9% IV SOLN 30 UNIT/500ML 30-0.9/5 UT/ML-%
0-334 SOLUTION INTRAVENOUS CONTINUOUS
Status: DISCONTINUED | OUTPATIENT
Start: 2021-12-27 | End: 2021-12-27

## 2021-12-27 RX ORDER — OXYTOCIN-SODIUM CHLORIDE 0.9% IV SOLN 30 UNIT/500ML 30-0.9/5 UT/ML-%
0-334 SOLUTION INTRAVENOUS CONTINUOUS
Status: CANCELLED | OUTPATIENT
Start: 2021-12-27

## 2021-12-27 RX ORDER — OXYTOCIN-SODIUM CHLORIDE 0.9% IV SOLN 30 UNIT/500ML 30-0.9/5 UT/ML-%
0-334 SOLUTION INTRAVENOUS CONTINUOUS
Status: DISCONTINUED | OUTPATIENT
Start: 2021-12-27 | End: 2021-12-31 | Stop reason: HOSPADM

## 2021-12-27 RX ORDER — NALBUPHINE HYDROCHLORIDE 10 MG/ML
2.5 INJECTION, SOLUTION INTRAMUSCULAR; INTRAVENOUS; SUBCUTANEOUS
Status: DISCONTINUED | OUTPATIENT
Start: 2021-12-27 | End: 2021-12-27

## 2021-12-27 RX ORDER — DIPHENHYDRAMINE HYDROCHLORIDE 50 MG/ML
25 INJECTION INTRAMUSCULAR; INTRAVENOUS
Status: DISCONTINUED | OUTPATIENT
Start: 2021-12-27 | End: 2021-12-27

## 2021-12-27 RX ORDER — METOCLOPRAMIDE HYDROCHLORIDE 5 MG/ML
5 INJECTION INTRAMUSCULAR; INTRAVENOUS EVERY 6 HOURS PRN
Status: DISCONTINUED | OUTPATIENT
Start: 2021-12-27 | End: 2021-12-27

## 2021-12-27 RX ORDER — OXYCODONE HYDROCHLORIDE 5 MG/1
5 TABLET ORAL EVERY 4 HOURS PRN
Status: DISCONTINUED | OUTPATIENT
Start: 2021-12-27 | End: 2021-12-31 | Stop reason: HOSPADM

## 2021-12-27 RX ORDER — SODIUM CHLORIDE, SODIUM LACTATE, POTASSIUM CHLORIDE, CALCIUM CHLORIDE 600; 310; 30; 20 MG/100ML; MG/100ML; MG/100ML; MG/100ML
INJECTION, SOLUTION INTRAVENOUS CONTINUOUS
Status: DISCONTINUED | OUTPATIENT
Start: 2021-12-27 | End: 2021-12-31 | Stop reason: HOSPADM

## 2021-12-27 RX ORDER — SODIUM CHLORIDE, SODIUM LACTATE, POTASSIUM CHLORIDE, CALCIUM CHLORIDE 600; 310; 30; 20 MG/100ML; MG/100ML; MG/100ML; MG/100ML
INJECTION, SOLUTION INTRAVENOUS CONTINUOUS
Status: DISCONTINUED | OUTPATIENT
Start: 2021-12-27 | End: 2021-12-27

## 2021-12-27 RX ORDER — POLYETHYLENE GLYCOL 3350 17 G/17G
17 POWDER, FOR SOLUTION ORAL DAILY PRN
Status: DISCONTINUED | OUTPATIENT
Start: 2021-12-27 | End: 2021-12-31 | Stop reason: HOSPADM

## 2021-12-27 RX ORDER — PHENYLEPHRINE HYDROCHLORIDE 10 MG/ML
INJECTION, SOLUTION INTRAMUSCULAR; INTRAVENOUS; SUBCUTANEOUS PRN
Status: DISCONTINUED | OUTPATIENT
Start: 2021-12-27 | End: 2021-12-27

## 2021-12-27 RX ORDER — CLINDAMYCIN PHOSPHATE 900 MG/50ML
900 INJECTION INTRAVENOUS
Status: COMPLETED | OUTPATIENT
Start: 2021-12-27 | End: 2021-12-27

## 2021-12-27 RX ORDER — ONDANSETRON 2 MG/ML
INJECTION INTRAMUSCULAR; INTRAVENOUS PRN
Status: DISCONTINUED | OUTPATIENT
Start: 2021-12-27 | End: 2021-12-27

## 2021-12-27 RX ORDER — EPHEDRINE SULFATE/0.9% NACL/PF 50 MG/10ML
5 SYRINGE (ML) INTRAVENOUS
Status: DISCONTINUED | OUTPATIENT
Start: 2021-12-27 | End: 2021-12-27

## 2021-12-27 RX ORDER — METOCLOPRAMIDE HYDROCHLORIDE 5 MG/ML
INJECTION INTRAMUSCULAR; INTRAVENOUS PRN
Status: DISCONTINUED | OUTPATIENT
Start: 2021-12-27 | End: 2021-12-27

## 2021-12-27 RX ORDER — ONDANSETRON 2 MG/ML
4 INJECTION INTRAMUSCULAR; INTRAVENOUS 2 TIMES DAILY PRN
Status: DISCONTINUED | OUTPATIENT
Start: 2021-12-27 | End: 2021-12-27

## 2021-12-27 RX ADMIN — CLINDAMYCIN IN 5 PERCENT DEXTROSE 900 MG: 18 INJECTION, SOLUTION INTRAVENOUS at 09:44

## 2021-12-27 RX ADMIN — SODIUM CHLORIDE, POTASSIUM CHLORIDE, SODIUM LACTATE AND CALCIUM CHLORIDE 1000 ML: 600; 310; 30; 20 INJECTION, SOLUTION INTRAVENOUS at 08:29

## 2021-12-27 RX ADMIN — GENTAMICIN SULFATE 490 MG: 40 INJECTION, SOLUTION INTRAMUSCULAR; INTRAVENOUS at 10:20

## 2021-12-27 RX ADMIN — OXYTOCIN 334 MILLI-UNITS/MIN: 10 INJECTION, SOLUTION INTRAMUSCULAR; INTRAVENOUS at 10:12

## 2021-12-27 RX ADMIN — PHENYLEPHRINE HYDROCHLORIDE 50 MCG: 10 INJECTION, SOLUTION INTRAMUSCULAR; INTRAVENOUS; SUBCUTANEOUS at 10:00

## 2021-12-27 RX ADMIN — PHENYLEPHRINE HYDROCHLORIDE 100 MCG: 10 INJECTION, SOLUTION INTRAMUSCULAR; INTRAVENOUS; SUBCUTANEOUS at 09:48

## 2021-12-27 RX ADMIN — SODIUM CHLORIDE, POTASSIUM CHLORIDE, SODIUM LACTATE AND CALCIUM CHLORIDE: 600; 310; 30; 20 INJECTION, SOLUTION INTRAVENOUS at 09:23

## 2021-12-27 RX ADMIN — PHENYLEPHRINE HYDROCHLORIDE 100 MCG: 10 INJECTION, SOLUTION INTRAMUSCULAR; INTRAVENOUS; SUBCUTANEOUS at 09:52

## 2021-12-27 RX ADMIN — OXYCODONE HYDROCHLORIDE 5 MG: 5 TABLET ORAL at 13:59

## 2021-12-27 RX ADMIN — PHENYLEPHRINE HYDROCHLORIDE 100 MCG: 10 INJECTION, SOLUTION INTRAMUSCULAR; INTRAVENOUS; SUBCUTANEOUS at 10:21

## 2021-12-27 RX ADMIN — ONDANSETRON 4 MG: 2 INJECTION INTRAMUSCULAR; INTRAVENOUS at 09:37

## 2021-12-27 RX ADMIN — PHENYLEPHRINE HYDROCHLORIDE 100 MCG: 10 INJECTION, SOLUTION INTRAMUSCULAR; INTRAVENOUS; SUBCUTANEOUS at 09:44

## 2021-12-27 RX ADMIN — MIDAZOLAM HYDROCHLORIDE 1 MG: 1 INJECTION, SOLUTION INTRAMUSCULAR; INTRAVENOUS at 10:20

## 2021-12-27 RX ADMIN — SODIUM CHLORIDE, POTASSIUM CHLORIDE, SODIUM LACTATE AND CALCIUM CHLORIDE: 600; 310; 30; 20 INJECTION, SOLUTION INTRAVENOUS at 14:18

## 2021-12-27 RX ADMIN — KETOROLAC TROMETHAMINE 30 MG: 30 INJECTION, SOLUTION INTRAMUSCULAR at 17:59

## 2021-12-27 RX ADMIN — SODIUM CHLORIDE, POTASSIUM CHLORIDE, SODIUM LACTATE AND CALCIUM CHLORIDE: 600; 310; 30; 20 INJECTION, SOLUTION INTRAVENOUS at 09:29

## 2021-12-27 RX ADMIN — METOCLOPRAMIDE HYDROCHLORIDE 10 MG: 5 INJECTION INTRAMUSCULAR; INTRAVENOUS at 09:46

## 2021-12-27 RX ADMIN — ACETAMINOPHEN 1000 MG: 500 TABLET ORAL at 21:57

## 2021-12-27 RX ADMIN — OXYCODONE HYDROCHLORIDE 5 MG: 5 TABLET ORAL at 19:10

## 2021-12-27 RX ADMIN — KETOROLAC TROMETHAMINE 30 MG: 30 INJECTION, SOLUTION INTRAMUSCULAR at 11:35

## 2021-12-27 RX ADMIN — ACETAMINOPHEN 1000 MG: 500 TABLET ORAL at 13:59

## 2021-12-27 ASSESSMENT — PAIN SCALES - GENERAL
PAINLEVEL_OUTOF10: 0
PAINLEVEL_OUTOF10: 2
PAINLEVEL_OUTOF10: 4
PAINLEVEL_OUTOF10: 6
PAINLEVEL_OUTOF10: 2
PAINLEVEL_OUTOF10: 6
PAINLEVEL_OUTOF10: 0
PAINLEVEL_OUTOF10: 7
PAINLEVEL_OUTOF10: 7

## 2021-12-27 ASSESSMENT — LIFESTYLE VARIABLES
ADL NEEDS ASSIST: NO
SHORT OF BREATH OR FATIGUE WITH ADLS: NO
HOW OFTEN DO YOU HAVE 6 OR MORE DRINKS ON ONE OCCASION: NEVER
CHRONIC/CANCER PAIN PRESENT: NO
SMOKING_YEARS: NO
RECENT DECLINE IN ADLS: NO
LAST DRINK YOU HAD: DENIES INTAKE
HISTORY OF PROBLEMS WHEN YOU STOP DRINKING ALCOHOL: NO
ARE YOU DEAF OR DO YOU HAVE SERIOUS DIFFICULTY  HEARING: NO
AUDIT-C TOTAL SCORE: 0
ADL BEFORE ADMISSION: INDEPENDENT
ARE YOU BLIND OR DO YOU HAVE SERIOUS DIFFICULTY SEEING, EVEN WHEN WEARING GLASSES: NO
HOW MANY STANDARD DRINKS CONTAINING ALCOHOL DO YOU HAVE ON A TYPICAL DAY: 0,1 OR 2
HOW OFTEN DO YOU HAVE A DRINK CONTAINING ALCOHOL: NEVER
RECENTLY LOST WEIGHT WITHOUT TRYING: 0
IS PATIENT ABLE TO COMPLETE ASSESSMENT AT THIS TIME: YES
ALCOHOL_USE_STATUS: NO OR LOW RISK WITH VALIDATED TOOL
HAVE YOU BEEN EATING POORLY BECAUSE OF A DECREASED APPETITE: 0

## 2021-12-27 ASSESSMENT — ACTIVITIES OF DAILY LIVING (ADL)
ADL_SCORE: 12
SENSORY_SUPPORT_DEVICES: EYEGLASSES;CONTACTS

## 2021-12-27 ASSESSMENT — COGNITIVE AND FUNCTIONAL STATUS - GENERAL
BECAUSE OF A PHYSICAL, MENTAL, OR EMOTIONAL CONDITION, DO YOU HAVE DIFFICULTY DOING ERRANDS ALONE: NO
DO YOU HAVE DIFFICULTY DRESSING OR BATHING: NO
BECAUSE OF A PHYSICAL, MENTAL, OR EMOTIONAL CONDITION, DO YOU HAVE SERIOUS DIFFICULTY CONCENTRATING, REMEMBERING OR MAKING DECISIONS: NO
DO YOU HAVE SERIOUS DIFFICULTY WALKING OR CLIMBING STAIRS: NO

## 2021-12-28 LAB
DEPRECATED RDW RBC: 47.5 FL (ref 39–50)
ERYTHROCYTE [DISTWIDTH] IN BLOOD: 14.8 % (ref 11–15)
GLUCOSE BLDC GLUCOMTR-MCNC: 113 MG/DL (ref 70–99)
GLUCOSE BLDC GLUCOMTR-MCNC: 118 MG/DL (ref 70–99)
GLUCOSE BLDC GLUCOMTR-MCNC: 123 MG/DL (ref 70–99)
GLUCOSE BLDC GLUCOMTR-MCNC: 141 MG/DL (ref 70–99)
HCT VFR BLD CALC: 35 % (ref 36–46.5)
HGB BLD-MCNC: 11 G/DL (ref 12–15.5)
MCH RBC QN AUTO: 27.4 PG (ref 26–34)
MCHC RBC AUTO-ENTMCNC: 31.4 G/DL (ref 32–36.5)
MCV RBC AUTO: 87.3 FL (ref 78–100)
NRBC BLD MANUAL-RTO: 0 /100 WBC
PLATELET # BLD AUTO: 135 K/MCL (ref 140–450)
RBC # BLD: 4.01 MIL/MCL (ref 4–5.2)
WBC # BLD: 7.2 K/MCL (ref 4.2–11)

## 2021-12-28 PROCEDURE — 10004651 HB RX, NO CHARGE ITEM: Performed by: OBSTETRICS & GYNECOLOGY

## 2021-12-28 PROCEDURE — 36415 COLL VENOUS BLD VENIPUNCTURE: CPT | Performed by: OBSTETRICS & GYNECOLOGY

## 2021-12-28 PROCEDURE — 10002803 HB RX 637: Performed by: OBSTETRICS & GYNECOLOGY

## 2021-12-28 PROCEDURE — S9445 PT EDUCATION NOC INDIVID: HCPCS

## 2021-12-28 PROCEDURE — 59514 CESAREAN DELIVERY ONLY: CPT | Performed by: LEGAL MEDICINE

## 2021-12-28 PROCEDURE — 85027 COMPLETE CBC AUTOMATED: CPT | Performed by: OBSTETRICS & GYNECOLOGY

## 2021-12-28 PROCEDURE — 10002800 HB RX 250 W HCPCS: Performed by: OBSTETRICS & GYNECOLOGY

## 2021-12-28 PROCEDURE — 10000003 HB ROOM CHARGE WOMEN'S HEALTH

## 2021-12-28 RX ADMIN — KETOROLAC TROMETHAMINE 30 MG: 30 INJECTION, SOLUTION INTRAMUSCULAR at 00:15

## 2021-12-28 RX ADMIN — IBUPROFEN 600 MG: 600 TABLET ORAL at 18:15

## 2021-12-28 RX ADMIN — DOCUSATE SODIUM AND SENNOSIDES 2 TABLET: 8.6; 5 TABLET, FILM COATED ORAL at 07:54

## 2021-12-28 RX ADMIN — OXYCODONE HYDROCHLORIDE 5 MG: 5 TABLET ORAL at 04:00

## 2021-12-28 RX ADMIN — OXYCODONE HYDROCHLORIDE 5 MG: 5 TABLET ORAL at 20:25

## 2021-12-28 RX ADMIN — OXYCODONE HYDROCHLORIDE 5 MG: 5 TABLET ORAL at 12:11

## 2021-12-28 RX ADMIN — ACETAMINOPHEN 1000 MG: 500 TABLET ORAL at 15:41

## 2021-12-28 RX ADMIN — IBUPROFEN 600 MG: 600 TABLET ORAL at 05:58

## 2021-12-28 RX ADMIN — ACETAMINOPHEN 1000 MG: 500 TABLET ORAL at 05:59

## 2021-12-28 RX ADMIN — VITAMIN A, VITAMIN C, VITAMIN D, VITAMIN E, THIAMINE, RIBOFLAVIN, NIACIN, VITAMIN B6, FOLIC ACID, VITAMIN B12, CALCIUM, IRON, ZINC, COPPER 1 TABLET: 4000; 120; 400; 22; 1.84; 3; 20; 10; 1; 12; 200; 27; 25; 2 TABLET ORAL at 07:55

## 2021-12-28 RX ADMIN — LEVOTHYROXINE SODIUM 275 MCG: 0.11 TABLET ORAL at 05:58

## 2021-12-28 RX ADMIN — IBUPROFEN 600 MG: 600 TABLET ORAL at 12:10

## 2021-12-28 RX ADMIN — OXYCODONE HYDROCHLORIDE 5 MG: 5 TABLET ORAL at 07:55

## 2021-12-28 RX ADMIN — OXYCODONE HYDROCHLORIDE 5 MG: 5 TABLET ORAL at 15:40

## 2021-12-28 ASSESSMENT — PAIN SCALES - PAIN ASSESSMENT IN ADVANCED DEMENTIA (PAINAD): BREATHING: NORMAL

## 2021-12-28 ASSESSMENT — PAIN SCALES - GENERAL
PAINLEVEL_OUTOF10: 7
PAINLEVEL_OUTOF10: 7
PAINLEVEL_OUTOF10: 5
PAINLEVEL_OUTOF10: 7
PAINLEVEL_OUTOF10: 4
PAINLEVEL_OUTOF10: 7
PAINLEVEL_OUTOF10: 6
PAINLEVEL_OUTOF10: 6
PAINLEVEL_OUTOF10: 4
PAINLEVEL_OUTOF10: 7
PAINLEVEL_OUTOF10: 3
PAINLEVEL_OUTOF10: 5
PAINLEVEL_OUTOF10: 5

## 2021-12-29 LAB
ALT SERPL-CCNC: 25 UNITS/L
AST SERPL-CCNC: 36 UNITS/L
DEPRECATED RDW RBC: 46.4 FL (ref 39–50)
ERYTHROCYTE [DISTWIDTH] IN BLOOD: 14.6 % (ref 11–15)
GLUCOSE BLDC GLUCOMTR-MCNC: 73 MG/DL (ref 70–99)
GLUCOSE BLDC GLUCOMTR-MCNC: 76 MG/DL (ref 70–99)
GLUCOSE BLDC GLUCOMTR-MCNC: 84 MG/DL (ref 70–99)
HCT VFR BLD CALC: 34.1 % (ref 36–46.5)
HGB BLD-MCNC: 10.9 G/DL (ref 12–15.5)
MCH RBC QN AUTO: 27.7 PG (ref 26–34)
MCHC RBC AUTO-ENTMCNC: 32 G/DL (ref 32–36.5)
MCV RBC AUTO: 86.8 FL (ref 78–100)
NRBC BLD MANUAL-RTO: 0 /100 WBC
PLATELET # BLD AUTO: 145 K/MCL (ref 140–450)
RBC # BLD: 3.93 MIL/MCL (ref 4–5.2)
URATE SERPL-MCNC: 5 MG/DL (ref 2.6–5.9)
WBC # BLD: 6.5 K/MCL (ref 4.2–11)

## 2021-12-29 PROCEDURE — 10002803 HB RX 637: Performed by: OBSTETRICS & GYNECOLOGY

## 2021-12-29 PROCEDURE — 84460 ALANINE AMINO (ALT) (SGPT): CPT | Performed by: OBSTETRICS & GYNECOLOGY

## 2021-12-29 PROCEDURE — 10004651 HB RX, NO CHARGE ITEM: Performed by: OBSTETRICS & GYNECOLOGY

## 2021-12-29 PROCEDURE — 84550 ASSAY OF BLOOD/URIC ACID: CPT | Performed by: OBSTETRICS & GYNECOLOGY

## 2021-12-29 PROCEDURE — 10000003 HB ROOM CHARGE WOMEN'S HEALTH

## 2021-12-29 PROCEDURE — 36415 COLL VENOUS BLD VENIPUNCTURE: CPT | Performed by: OBSTETRICS & GYNECOLOGY

## 2021-12-29 PROCEDURE — 85027 COMPLETE CBC AUTOMATED: CPT | Performed by: OBSTETRICS & GYNECOLOGY

## 2021-12-29 PROCEDURE — 84450 TRANSFERASE (AST) (SGOT): CPT | Performed by: OBSTETRICS & GYNECOLOGY

## 2021-12-29 PROCEDURE — S9445 PT EDUCATION NOC INDIVID: HCPCS

## 2021-12-29 RX ORDER — NIFEDIPINE 30 MG/1
30 TABLET, EXTENDED RELEASE ORAL DAILY
Status: DISCONTINUED | OUTPATIENT
Start: 2021-12-29 | End: 2021-12-31 | Stop reason: HOSPADM

## 2021-12-29 RX ADMIN — IBUPROFEN 600 MG: 600 TABLET ORAL at 10:45

## 2021-12-29 RX ADMIN — ACETAMINOPHEN 1000 MG: 500 TABLET ORAL at 18:45

## 2021-12-29 RX ADMIN — IBUPROFEN 600 MG: 600 TABLET ORAL at 17:23

## 2021-12-29 RX ADMIN — IBUPROFEN 600 MG: 600 TABLET ORAL at 00:11

## 2021-12-29 RX ADMIN — LEVOTHYROXINE SODIUM 275 MCG: 0.11 TABLET ORAL at 06:27

## 2021-12-29 RX ADMIN — VITAMIN A, VITAMIN C, VITAMIN D, VITAMIN E, THIAMINE, RIBOFLAVIN, NIACIN, VITAMIN B6, FOLIC ACID, VITAMIN B12, CALCIUM, IRON, ZINC, COPPER 1 TABLET: 4000; 120; 400; 22; 1.84; 3; 20; 10; 1; 12; 200; 27; 25; 2 TABLET ORAL at 10:45

## 2021-12-29 RX ADMIN — OXYCODONE HYDROCHLORIDE 5 MG: 5 TABLET ORAL at 15:29

## 2021-12-29 RX ADMIN — NIFEDIPINE 30 MG: 30 TABLET, FILM COATED, EXTENDED RELEASE ORAL at 20:57

## 2021-12-29 RX ADMIN — OXYCODONE HYDROCHLORIDE 5 MG: 5 TABLET ORAL at 10:54

## 2021-12-29 RX ADMIN — OXYCODONE HYDROCHLORIDE 5 MG: 5 TABLET ORAL at 19:52

## 2021-12-29 RX ADMIN — ACETAMINOPHEN 1000 MG: 500 TABLET ORAL at 00:10

## 2021-12-29 RX ADMIN — ACETAMINOPHEN 1000 MG: 500 TABLET ORAL at 10:47

## 2021-12-29 ASSESSMENT — PAIN SCALES - GENERAL
PAINLEVEL_OUTOF10: 6
PAINLEVEL_OUTOF10: 6
PAINLEVEL_OUTOF10: 7
PAINLEVEL_OUTOF10: 9
PAINLEVEL_OUTOF10: 6
PAINLEVEL_OUTOF10: 6
PAINLEVEL_OUTOF10: 4
PAINLEVEL_OUTOF10: 3
PAINLEVEL_OUTOF10: 7
PAINLEVEL_OUTOF10: 9
PAINLEVEL_OUTOF10: 7
PAINLEVEL_OUTOF10: 6
PAINLEVEL_OUTOF10: 7
PAINLEVEL_OUTOF10: 3
PAINLEVEL_OUTOF10: 9

## 2021-12-30 LAB
ASR DISCLAIMER: NORMAL
CASE RPRT: NORMAL
CLINICAL INFO: NORMAL
GLUCOSE BLDC GLUCOMTR-MCNC: 102 MG/DL (ref 70–99)
GLUCOSE BLDC GLUCOMTR-MCNC: 103 MG/DL (ref 70–99)
GLUCOSE BLDC GLUCOMTR-MCNC: 113 MG/DL (ref 70–99)
GLUCOSE BLDC GLUCOMTR-MCNC: 125 MG/DL (ref 70–99)
PATH REPORT.FINAL DX SPEC: NORMAL
PATH REPORT.GROSS SPEC: NORMAL

## 2021-12-30 PROCEDURE — 10004651 HB RX, NO CHARGE ITEM: Performed by: OBSTETRICS & GYNECOLOGY

## 2021-12-30 PROCEDURE — 10002803 HB RX 637: Performed by: OBSTETRICS & GYNECOLOGY

## 2021-12-30 PROCEDURE — 10002800 HB RX 250 W HCPCS: Performed by: OBSTETRICS & GYNECOLOGY

## 2021-12-30 PROCEDURE — S9445 PT EDUCATION NOC INDIVID: HCPCS

## 2021-12-30 PROCEDURE — 10000003 HB ROOM CHARGE WOMEN'S HEALTH

## 2021-12-30 RX ORDER — ENOXAPARIN SODIUM 100 MG/ML
40 INJECTION SUBCUTANEOUS EVERY 24 HOURS
Status: DISCONTINUED | OUTPATIENT
Start: 2021-12-30 | End: 2021-12-31

## 2021-12-30 RX ADMIN — OXYCODONE HYDROCHLORIDE 5 MG: 5 TABLET ORAL at 13:41

## 2021-12-30 RX ADMIN — NIFEDIPINE 30 MG: 30 TABLET, FILM COATED, EXTENDED RELEASE ORAL at 09:16

## 2021-12-30 RX ADMIN — LEVOTHYROXINE SODIUM 275 MCG: 0.11 TABLET ORAL at 06:31

## 2021-12-30 RX ADMIN — ACETAMINOPHEN 1000 MG: 500 TABLET ORAL at 11:57

## 2021-12-30 RX ADMIN — VITAMIN A, VITAMIN C, VITAMIN D, VITAMIN E, THIAMINE, RIBOFLAVIN, NIACIN, VITAMIN B6, FOLIC ACID, VITAMIN B12, CALCIUM, IRON, ZINC, COPPER 1 TABLET: 4000; 120; 400; 22; 1.84; 3; 20; 10; 1; 12; 200; 27; 25; 2 TABLET ORAL at 09:16

## 2021-12-30 RX ADMIN — ACETAMINOPHEN 1000 MG: 500 TABLET ORAL at 19:53

## 2021-12-30 RX ADMIN — OXYCODONE HYDROCHLORIDE 5 MG: 5 TABLET ORAL at 00:14

## 2021-12-30 RX ADMIN — OXYCODONE HYDROCHLORIDE 5 MG: 5 TABLET ORAL at 03:50

## 2021-12-30 RX ADMIN — OXYCODONE HYDROCHLORIDE 5 MG: 5 TABLET ORAL at 09:16

## 2021-12-30 RX ADMIN — OXYCODONE HYDROCHLORIDE 5 MG: 5 TABLET ORAL at 21:55

## 2021-12-30 RX ADMIN — ACETAMINOPHEN 1000 MG: 500 TABLET ORAL at 03:49

## 2021-12-30 RX ADMIN — IBUPROFEN 600 MG: 600 TABLET ORAL at 00:15

## 2021-12-30 RX ADMIN — IBUPROFEN 600 MG: 600 TABLET ORAL at 11:57

## 2021-12-30 RX ADMIN — ENOXAPARIN SODIUM 40 MG: 40 INJECTION SUBCUTANEOUS at 17:51

## 2021-12-30 RX ADMIN — IBUPROFEN 600 MG: 600 TABLET ORAL at 06:30

## 2021-12-30 RX ADMIN — DOCUSATE SODIUM AND SENNOSIDES 2 TABLET: 8.6; 5 TABLET, FILM COATED ORAL at 09:16

## 2021-12-30 RX ADMIN — IBUPROFEN 600 MG: 600 TABLET ORAL at 23:48

## 2021-12-30 RX ADMIN — IBUPROFEN 600 MG: 600 TABLET ORAL at 17:49

## 2021-12-30 RX ADMIN — OXYCODONE HYDROCHLORIDE 5 MG: 5 TABLET ORAL at 17:49

## 2021-12-30 ASSESSMENT — PAIN SCALES - GENERAL
PAINLEVEL_OUTOF10: 5
PAINLEVEL_OUTOF10: 6
PAINLEVEL_OUTOF10: 3
PAINLEVEL_OUTOF10: 4
PAINLEVEL_OUTOF10: 7
PAINLEVEL_OUTOF10: 3
PAINLEVEL_OUTOF10: 6
PAINLEVEL_OUTOF10: 4
PAINLEVEL_OUTOF10: 4

## 2021-12-31 ENCOUNTER — PATIENT MESSAGE (OUTPATIENT)
Dept: ENDOCRINOLOGY CLINIC | Facility: CLINIC | Age: 36
End: 2021-12-31

## 2021-12-31 VITALS
RESPIRATION RATE: 16 BRPM | WEIGHT: 293 LBS | DIASTOLIC BLOOD PRESSURE: 78 MMHG | BODY MASS INDEX: 48.82 KG/M2 | HEART RATE: 103 BPM | SYSTOLIC BLOOD PRESSURE: 131 MMHG | HEIGHT: 65 IN | TEMPERATURE: 97.5 F | OXYGEN SATURATION: 98 %

## 2021-12-31 DIAGNOSIS — E03.8 HYPOTHYROIDISM DUE TO HASHIMOTO'S THYROIDITIS: Primary | ICD-10-CM

## 2021-12-31 DIAGNOSIS — E06.3 HYPOTHYROIDISM DUE TO HASHIMOTO'S THYROIDITIS: Primary | ICD-10-CM

## 2021-12-31 LAB
GLUCOSE BLDC GLUCOMTR-MCNC: 117 MG/DL (ref 70–99)
GLUCOSE BLDC GLUCOMTR-MCNC: 64 MG/DL (ref 70–99)

## 2021-12-31 PROCEDURE — 10004651 HB RX, NO CHARGE ITEM: Performed by: OBSTETRICS & GYNECOLOGY

## 2021-12-31 PROCEDURE — 10002800 HB RX 250 W HCPCS: Performed by: OBSTETRICS & GYNECOLOGY

## 2021-12-31 PROCEDURE — 10002803 HB RX 637: Performed by: OBSTETRICS & GYNECOLOGY

## 2021-12-31 PROCEDURE — S9445 PT EDUCATION NOC INDIVID: HCPCS

## 2021-12-31 RX ORDER — ENOXAPARIN SODIUM 100 MG/ML
40 INJECTION SUBCUTANEOUS EVERY 12 HOURS SCHEDULED
Status: DISCONTINUED | OUTPATIENT
Start: 2021-12-31 | End: 2021-12-31 | Stop reason: HOSPADM

## 2021-12-31 RX ORDER — ACETAMINOPHEN 500 MG
1000 TABLET ORAL EVERY 6 HOURS PRN
Status: SHIPPED | COMMUNITY
Start: 2021-12-31 | End: 2022-02-17 | Stop reason: HOSPADM

## 2021-12-31 RX ORDER — ENOXAPARIN SODIUM 100 MG/ML
40 INJECTION SUBCUTANEOUS EVERY 12 HOURS SCHEDULED
Qty: 33.6 ML | Refills: 0 | Status: SHIPPED | OUTPATIENT
Start: 2021-12-31 | End: 2022-02-17 | Stop reason: HOSPADM

## 2021-12-31 RX ORDER — NIFEDIPINE 30 MG/1
30 TABLET, EXTENDED RELEASE ORAL 2 TIMES DAILY
Qty: 90 TABLET | Refills: 0 | Status: SHIPPED | OUTPATIENT
Start: 2021-12-31 | End: 2022-02-17 | Stop reason: HOSPADM

## 2021-12-31 RX ORDER — OXYCODONE HYDROCHLORIDE 5 MG/1
5 TABLET ORAL EVERY 4 HOURS PRN
Qty: 12 TABLET | Refills: 0 | Status: SHIPPED | OUTPATIENT
Start: 2021-12-31 | End: 2022-01-05 | Stop reason: SDUPTHER

## 2021-12-31 RX ORDER — IBUPROFEN 600 MG/1
600 TABLET ORAL EVERY 6 HOURS SCHEDULED
Status: SHIPPED | COMMUNITY
Start: 2021-12-31

## 2021-12-31 RX ADMIN — NIFEDIPINE 30 MG: 30 TABLET, FILM COATED, EXTENDED RELEASE ORAL at 09:06

## 2021-12-31 RX ADMIN — OXYCODONE HYDROCHLORIDE 5 MG: 5 TABLET ORAL at 02:16

## 2021-12-31 RX ADMIN — VITAMIN A, VITAMIN C, VITAMIN D, VITAMIN E, THIAMINE, RIBOFLAVIN, NIACIN, VITAMIN B6, FOLIC ACID, VITAMIN B12, CALCIUM, IRON, ZINC, COPPER 1 TABLET: 4000; 120; 400; 22; 1.84; 3; 20; 10; 1; 12; 200; 27; 25; 2 TABLET ORAL at 09:06

## 2021-12-31 RX ADMIN — ACETAMINOPHEN 1000 MG: 500 TABLET ORAL at 02:16

## 2021-12-31 RX ADMIN — OXYCODONE HYDROCHLORIDE 5 MG: 5 TABLET ORAL at 10:36

## 2021-12-31 RX ADMIN — LEVOTHYROXINE SODIUM 275 MCG: 0.11 TABLET ORAL at 06:10

## 2021-12-31 RX ADMIN — ACETAMINOPHEN 1000 MG: 500 TABLET ORAL at 10:36

## 2021-12-31 RX ADMIN — IBUPROFEN 600 MG: 600 TABLET ORAL at 06:09

## 2021-12-31 RX ADMIN — ENOXAPARIN SODIUM 40 MG: 40 INJECTION SUBCUTANEOUS at 11:43

## 2021-12-31 RX ADMIN — OXYCODONE HYDROCHLORIDE 5 MG: 5 TABLET ORAL at 06:10

## 2021-12-31 RX ADMIN — IBUPROFEN 600 MG: 600 TABLET ORAL at 11:47

## 2021-12-31 ASSESSMENT — PAIN SCALES - GENERAL
PAINLEVEL_OUTOF10: 4
PAINLEVEL_OUTOF10: 5

## 2022-01-01 ENCOUNTER — EXTERNAL RECORD (OUTPATIENT)
Dept: OTHER | Age: 37
End: 2022-01-01

## 2022-01-03 ENCOUNTER — PATIENT MESSAGE (OUTPATIENT)
Dept: ENDOCRINOLOGY CLINIC | Facility: CLINIC | Age: 37
End: 2022-01-03

## 2022-01-03 ENCOUNTER — MED REC SCAN ONLY (OUTPATIENT)
Dept: FAMILY MEDICINE CLINIC | Facility: CLINIC | Age: 37
End: 2022-01-03

## 2022-01-03 NOTE — TELEPHONE ENCOUNTER
From: Aydee Wade  Sent: 1/3/2022 12:58 PM CST  To: Kathryn Beckman Clinical Staff  Subject: Being discharged today! Yes and ok sounds good!

## 2022-01-04 NOTE — TELEPHONE ENCOUNTER
Spoke to patient and assisted in making follow up appointment with ERIC Berrios.      Future Appointments   Date Time Provider Christa Teresa   2/9/2022 11:00 AM GAURAV Means [facility]

## 2022-01-05 ENCOUNTER — OFFICE VISIT (OUTPATIENT)
Dept: OBGYN | Age: 37
End: 2022-01-05

## 2022-01-05 VITALS
BODY MASS INDEX: 55.69 KG/M2 | WEIGHT: 293 LBS | DIASTOLIC BLOOD PRESSURE: 69 MMHG | SYSTOLIC BLOOD PRESSURE: 114 MMHG | HEART RATE: 111 BPM

## 2022-01-05 DIAGNOSIS — O99.210 OBESITY IN PREGNANCY: ICD-10-CM

## 2022-01-05 DIAGNOSIS — Z98.891 STATUS POST REPEAT LOW TRANSVERSE CESAREAN SECTION: ICD-10-CM

## 2022-01-05 DIAGNOSIS — O24.019 PRE-EXISTING TYPE 1 DIABETES MELLITUS DURING PREGNANCY, ANTEPARTUM: ICD-10-CM

## 2022-01-05 DIAGNOSIS — G47.33 OBSTRUCTIVE SLEEP APNEA SYNDROME: ICD-10-CM

## 2022-01-05 DIAGNOSIS — E03.9 HYPOTHYROIDISM, UNSPECIFIED TYPE: ICD-10-CM

## 2022-01-05 PROBLEM — O13.9 GESTATIONAL HYPERTENSION, ANTEPARTUM: Status: ACTIVE | Noted: 2022-01-05

## 2022-01-05 PROCEDURE — 99024 POSTOP FOLLOW-UP VISIT: CPT | Performed by: OBSTETRICS & GYNECOLOGY

## 2022-01-05 RX ORDER — OXYCODONE HYDROCHLORIDE 5 MG/1
5 TABLET ORAL EVERY 8 HOURS PRN
Qty: 14 TABLET | Refills: 0 | Status: SHIPPED | OUTPATIENT
Start: 2022-01-05 | End: 2022-02-17 | Stop reason: HOSPADM

## 2022-01-25 ENCOUNTER — DOCUMENTATION (OUTPATIENT)
Dept: OBGYN | Age: 37
End: 2022-01-25

## 2022-01-31 RX ORDER — LEVOTHYROXINE SODIUM 0.05 MG/1
50 TABLET ORAL
Qty: 90 TABLET | Refills: 1 | Status: SHIPPED | OUTPATIENT
Start: 2022-01-31

## 2022-01-31 RX ORDER — LEVOTHYROXINE SODIUM 0.2 MG/1
200 TABLET ORAL
Qty: 90 TABLET | Refills: 1 | Status: SHIPPED | OUTPATIENT
Start: 2022-01-31

## 2022-01-31 NOTE — TELEPHONE ENCOUNTER
LOV: 12/02/21    Future Appointments   Date Time Provider Christa Teresa   2/9/2022 11:00 AM [facility]

## 2022-02-09 ENCOUNTER — OFFICE VISIT (OUTPATIENT)
Dept: ENDOCRINOLOGY CLINIC | Facility: CLINIC | Age: 37
End: 2022-02-09
Payer: COMMERCIAL

## 2022-02-09 ENCOUNTER — LAB ENCOUNTER (OUTPATIENT)
Dept: LAB | Facility: HOSPITAL | Age: 37
End: 2022-02-09
Attending: INTERNAL MEDICINE
Payer: COMMERCIAL

## 2022-02-09 VITALS
WEIGHT: 293 LBS | HEART RATE: 101 BPM | BODY MASS INDEX: 54 KG/M2 | SYSTOLIC BLOOD PRESSURE: 125 MMHG | DIASTOLIC BLOOD PRESSURE: 80 MMHG

## 2022-02-09 DIAGNOSIS — E06.3 HYPOTHYROIDISM DUE TO HASHIMOTO'S THYROIDITIS: ICD-10-CM

## 2022-02-09 DIAGNOSIS — E03.8 HYPOTHYROIDISM DUE TO HASHIMOTO'S THYROIDITIS: ICD-10-CM

## 2022-02-09 DIAGNOSIS — E11.65 UNCONTROLLED TYPE 2 DIABETES MELLITUS WITH HYPERGLYCEMIA (HCC): Primary | ICD-10-CM

## 2022-02-09 LAB
CARTRIDGE LOT#: ABNORMAL NUMERIC
GLUCOSE BLOOD: 185
HEMOGLOBIN A1C: 7.1 % (ref 4.3–5.6)
T4 FREE SERPL-MCNC: 1.1 NG/DL (ref 0.8–1.7)
TEST STRIP LOT #: NORMAL NUMERIC
TSI SER-ACNC: 1.45 MIU/ML (ref 0.36–3.74)

## 2022-02-09 PROCEDURE — 3079F DIAST BP 80-89 MM HG: CPT

## 2022-02-09 PROCEDURE — 84439 ASSAY OF FREE THYROXINE: CPT

## 2022-02-09 PROCEDURE — 36415 COLL VENOUS BLD VENIPUNCTURE: CPT

## 2022-02-09 PROCEDURE — 82947 ASSAY GLUCOSE BLOOD QUANT: CPT

## 2022-02-09 PROCEDURE — 36416 COLLJ CAPILLARY BLOOD SPEC: CPT

## 2022-02-09 PROCEDURE — 3051F HG A1C>EQUAL 7.0%<8.0%: CPT

## 2022-02-09 PROCEDURE — 99214 OFFICE O/P EST MOD 30 MIN: CPT

## 2022-02-09 PROCEDURE — 3074F SYST BP LT 130 MM HG: CPT

## 2022-02-09 PROCEDURE — 83036 HEMOGLOBIN GLYCOSYLATED A1C: CPT

## 2022-02-09 PROCEDURE — 84443 ASSAY THYROID STIM HORMONE: CPT

## 2022-02-15 DIAGNOSIS — R06.09 DYSPNEA ON EXERTION: ICD-10-CM

## 2022-02-15 RX ORDER — INSULIN ASPART 100 [IU]/ML
INJECTION, SOLUTION INTRAVENOUS; SUBCUTANEOUS
Qty: 54 ML | Refills: 0 | Status: SHIPPED | OUTPATIENT
Start: 2022-02-15 | End: 2022-02-17

## 2022-02-15 RX ORDER — ALBUTEROL SULFATE 90 UG/1
2 AEROSOL, METERED RESPIRATORY (INHALATION) EVERY 4 HOURS PRN
Qty: 18 EACH | Refills: 5 | OUTPATIENT
Start: 2022-02-15

## 2022-02-15 NOTE — TELEPHONE ENCOUNTER
Please review. Protocol failed/ No protocol      Requested Prescriptions   Pending Prescriptions Disp Refills    ALBUTEROL 108 (90 Base) MCG/ACT Inhalation Aero Soln [Pharmacy Med Name: ALBUTEROL HFA (VENTOLIN) INH] 18 each 5     Sig: Inhale 2 puffs into the lungs every 4 (four) hours as needed for Wheezing or Shortness of Breath.         Asthma & COPD Medication Protocol Failed - 2/15/2022 12:00 AM        Failed - Appointment in past 6 or next 3 months              Future Appointments         Provider Department Appt Notes    In 2 months Jason Schuster [address] [place] [address] Endocrinology Follow up             Recent Outpatient Visits              6 days ago Uncontrolled type 2 diabetes mellitus with hyperglycemia [facility])    [address] [facility] GAURAV Weinberg    Office Visit    2 months ago Uncontrolled type 2 diabetes mellitus with hyperglycemia [facility])    [address] End[facility]ology Gregorio Emery MD    Office Visit    3 months ago Uncontrolled type 2 diabetes mellitus with hyperglycemia [facility])    [address] End[facility] Gregorio Emery MD    Office Visit    4 months ago Uncontrolled type 2 diabetes mellitus with hyperglycemia [facility])    [address] End[facility] Gregorio Emery MD    Office Visit    5 months ago Uncontrolled type 2 diabetes mellitus with hyperglycemia [facility])    [address] End[facility] Gregorio Emery MD    Office Visit

## 2022-02-17 ENCOUNTER — OFFICE VISIT (OUTPATIENT)
Dept: OBGYN | Age: 37
End: 2022-02-17

## 2022-02-17 VITALS
HEART RATE: 95 BPM | HEIGHT: 65 IN | BODY MASS INDEX: 48.82 KG/M2 | DIASTOLIC BLOOD PRESSURE: 84 MMHG | WEIGHT: 293 LBS | SYSTOLIC BLOOD PRESSURE: 131 MMHG

## 2022-02-17 DIAGNOSIS — Z30.09 COUNSELING FOR BIRTH CONTROL, ORAL CONTRACEPTIVES: ICD-10-CM

## 2022-02-17 PROBLEM — R76.8 BIOLOGICAL FALSE POSITIVE RPR TEST: Status: RESOLVED | Noted: 2021-11-26 | Resolved: 2022-02-17

## 2022-02-17 PROBLEM — O09.299: Status: RESOLVED | Noted: 2019-02-08 | Resolved: 2022-02-17

## 2022-02-17 PROBLEM — D69.6 THROMBOCYTOPENIA (CMD): Status: RESOLVED | Noted: 2021-11-24 | Resolved: 2022-02-17

## 2022-02-17 PROBLEM — O99.210 OBESITY IN PREGNANCY: Status: RESOLVED | Noted: 2019-02-08 | Resolved: 2022-02-17

## 2022-02-17 PROBLEM — O99.013 ANEMIA DURING PREGNANCY IN THIRD TRIMESTER: Status: RESOLVED | Noted: 2021-10-06 | Resolved: 2022-02-17

## 2022-02-17 PROBLEM — O13.9 GESTATIONAL HYPERTENSION, ANTEPARTUM: Status: RESOLVED | Noted: 2022-01-05 | Resolved: 2022-02-17

## 2022-02-17 PROBLEM — O09.90 HIGH-RISK PREGNANCY: Status: RESOLVED | Noted: 2021-07-08 | Resolved: 2022-02-17

## 2022-02-17 PROBLEM — O09.529 ELDERLY MULTIGRAVIDA WITH ANTEPARTUM CONDITION OR COMPLICATION: Status: RESOLVED | Noted: 2019-02-08 | Resolved: 2022-02-17

## 2022-02-17 PROCEDURE — 0503F POSTPARTUM CARE VISIT: CPT | Performed by: OBSTETRICS & GYNECOLOGY

## 2022-02-17 RX ORDER — INSULIN ASPART 100 [IU]/ML
60 INJECTION, SOLUTION INTRAVENOUS; SUBCUTANEOUS DAILY
Qty: 60 ML | Refills: 0 | Status: SHIPPED | OUTPATIENT
Start: 2022-02-17

## 2022-02-17 RX ORDER — NORETHINDRONE ACETATE AND ETHINYL ESTRADIOL 1; .02 MG/1; MG/1
TABLET ORAL
Qty: 84 TABLET | Refills: 1 | Status: SHIPPED | OUTPATIENT
Start: 2022-02-17

## 2022-02-17 ASSESSMENT — EDINBURGH POSTNATAL DEPRESSION SCALE (EPDS)
I HAVE BEEN SO UNHAPPY THAT I HAVE HAD DIFFICULTY SLEEPING: NOT VERY OFTEN
THE THOUGHT OF HARMING MYSELF HAS OCCURRED TO ME: NEVER
I HAVE FELT SAD OR MISERABLE: NOT VERY OFTEN
TOTAL SCORE: 9
I HAVE BEEN ABLE TO LAUGH AND SEE THE FUNNY SIDE OF THINGS: AS MUCH AS I ALWAYS COULD
I HAVE LOOKED FORWARD WITH ENJOYMENT TO THINGS: AS MUCH AS I EVER DID
THINGS HAVE BEEN GETTING ON TOP OF ME: YES, SOMETIMES I HAVEN'T BEEN COPING AS WELL AS USUAL
I HAVE BEEN SO UNHAPPY THAT I HAVE BEEN CRYING: NO, NEVER
I HAVE BLAMED MYSELF UNNECESSARILY WHEN THINGS WENT WRONG: NOT VERY OFTEN
I HAVE BEEN ANXIOUS OR WORRIED FOR NO GOOD REASON: YES, SOMETIMES
I HAVE FELT SCARED OR PANICKY FOR NO GOOD REASON: YES, SOMETIMES

## 2022-02-17 NOTE — TELEPHONE ENCOUNTER
Sophia King, per pharmacy need a qty of 60 ml for novolog flexpen as they cannot open insulin boxes. rx pended for you to review and sign off if appropriate. Thank you.

## 2022-02-22 ENCOUNTER — TELEPHONE (OUTPATIENT)
Dept: ENDOCRINOLOGY CLINIC | Facility: CLINIC | Age: 37
End: 2022-02-22

## 2022-02-22 NOTE — TELEPHONE ENCOUNTER
Received fax from [employer]. The request for Guardian sensor 3 is approved. Approved dates 02/17/22 - 02/17/23. Authorization #: [id number]. Notified via [address]

## 2022-02-24 ENCOUNTER — TELEPHONE (OUTPATIENT)
Dept: FAMILY MEDICINE | Age: 37
End: 2022-02-24

## 2022-02-25 RX ORDER — NIFEDIPINE 30 MG/1
TABLET, EXTENDED RELEASE ORAL
Qty: 60 TABLET | Refills: 1 | OUTPATIENT
Start: 2022-02-25

## 2022-03-03 ENCOUNTER — TELEPHONE (OUTPATIENT)
Dept: ENDOCRINOLOGY CLINIC | Facility: CLINIC | Age: 37
End: 2022-03-03

## 2022-03-03 NOTE — TELEPHONE ENCOUNTER
Received fax from [employer] requesting LOV chart notes. Printed off [facility] notes as of 2/9/2022.      Faxed back to [employer] at [phone]

## 2022-03-14 RX ORDER — INSULIN HUMAN 500 [IU]/ML
INJECTION, SOLUTION SUBCUTANEOUS
Qty: 60 ML | Refills: 0 | Status: SHIPPED | OUTPATIENT
Start: 2022-03-14

## 2022-05-02 RX ORDER — LEVOTHYROXINE SODIUM 0.07 MG/1
TABLET ORAL
Qty: 90 TABLET | Refills: 1 | Status: SHIPPED | OUTPATIENT
Start: 2022-05-02

## 2022-05-11 ENCOUNTER — TELEPHONE (OUTPATIENT)
Dept: ENDOCRINOLOGY CLINIC | Facility: CLINIC | Age: 37
End: 2022-05-11

## 2022-05-12 ENCOUNTER — OFFICE VISIT (OUTPATIENT)
Dept: ENDOCRINOLOGY CLINIC | Facility: CLINIC | Age: 37
End: 2022-05-12
Payer: COMMERCIAL

## 2022-05-12 VITALS
HEART RATE: 102 BPM | BODY MASS INDEX: 55 KG/M2 | WEIGHT: 293 LBS | SYSTOLIC BLOOD PRESSURE: 125 MMHG | DIASTOLIC BLOOD PRESSURE: 82 MMHG

## 2022-05-12 DIAGNOSIS — E11.65 UNCONTROLLED TYPE 2 DIABETES MELLITUS WITH HYPERGLYCEMIA (HCC): Primary | ICD-10-CM

## 2022-05-12 LAB
CARTRIDGE LOT#: ABNORMAL NUMERIC
GLUCOSE BLOOD: 165
HEMOGLOBIN A1C: 7.2 % (ref 4.3–5.6)
TEST STRIP LOT #: NORMAL NUMERIC

## 2022-05-16 RX ORDER — INSULIN ASPART 100 [IU]/ML
60 INJECTION, SOLUTION INTRAVENOUS; SUBCUTANEOUS DAILY
Qty: 54 ML | Refills: 0 | Status: SHIPPED | OUTPATIENT
Start: 2022-05-16

## 2022-05-16 NOTE — TELEPHONE ENCOUNTER
LOV: 5/12/22    RTC: 3 Months    FU: No FU Appt Scheduled    Last Refill: 2/17/22    3 Month Supply Pending    Please review and make any changes if needed

## 2022-06-22 ENCOUNTER — PATIENT MESSAGE (OUTPATIENT)
Dept: ENDOCRINOLOGY CLINIC | Facility: CLINIC | Age: 37
End: 2022-06-22

## 2022-06-22 RX ORDER — INSULIN HUMAN 500 [IU]/ML
INJECTION, SOLUTION SUBCUTANEOUS
Qty: 60 ML | Refills: 1 | Status: SHIPPED | OUTPATIENT
Start: 2022-06-22

## 2022-06-22 NOTE — TELEPHONE ENCOUNTER
LOV: 05/12/22 - seen by ERIC Street - please see/review pended refill request for accuracy. RX from pharmacy had 300 units TDD. RN changed to 310 based on most recent pump download under media tab and pump settings on LOV.   Thank you

## 2022-06-27 ENCOUNTER — PATIENT MESSAGE (OUTPATIENT)
Dept: ENDOCRINOLOGY CLINIC | Facility: CLINIC | Age: 37
End: 2022-06-27

## 2022-06-28 RX ORDER — INSULIN LISPRO 100 [IU]/ML
INJECTION, SOLUTION INTRAVENOUS; SUBCUTANEOUS
Qty: 15 ML | Refills: 0 | Status: SHIPPED | OUTPATIENT
Start: 2022-06-28

## 2022-06-28 NOTE — TELEPHONE ENCOUNTER
Toña,     Please see message/request below. RN pended [place]. Please review for accuracy. Thank you. I:YVONNE 1:8 + CF 1:60>100?

## 2022-07-04 ENCOUNTER — PATIENT MESSAGE (OUTPATIENT)
Dept: ENDOCRINOLOGY CLINIC | Facility: CLINIC | Age: 37
End: 2022-07-04

## 2022-07-05 ENCOUNTER — PATIENT MESSAGE (OUTPATIENT)
Dept: ENDOCRINOLOGY CLINIC | Facility: CLINIC | Age: 37
End: 2022-07-05

## 2022-07-05 RX ORDER — INSULIN LISPRO 100 [IU]/ML
INJECTION, SOLUTION INTRAVENOUS; SUBCUTANEOUS
Qty: 45 ML | Refills: 2 | Status: SHIPPED | OUTPATIENT
Start: 2022-07-05

## 2022-07-05 RX ORDER — INSULIN DETEMIR 100 [IU]/ML
INJECTION, SOLUTION SUBCUTANEOUS
Qty: 30 ML | Refills: 2 | Status: SHIPPED | OUTPATIENT
Start: 2022-07-05

## 2022-07-05 NOTE — TELEPHONE ENCOUNTER
Steve Moder in Levemir (95 units nightly) and Humalog (125 units daily) to pharmacy.  [facility] has extra [employer] supplies and pt can call tomorrow to coordinate

## 2022-07-22 ENCOUNTER — PATIENT MESSAGE (OUTPATIENT)
Dept: ENDOCRINOLOGY CLINIC | Facility: CLINIC | Age: 37
End: 2022-07-22

## 2022-07-25 NOTE — TELEPHONE ENCOUNTER
Sent message via [employer]. To do labs fasting. May walk into lab and informed what labs are ordered.

## 2022-08-08 RX ORDER — LEVOTHYROXINE SODIUM 0.2 MG/1
200 TABLET ORAL
Qty: 90 TABLET | Refills: 1 | Status: SHIPPED | OUTPATIENT
Start: 2022-08-08

## 2022-08-08 NOTE — TELEPHONE ENCOUNTER
LOV: 5/12/22    RTC: 3 Months    FU: No FU Appt Scheduled    Last Refill: 1/31/22    6 Month Supply Pending     MyChart message sent

## 2022-09-01 NOTE — TELEPHONE ENCOUNTER
LOV: 5/12/22 RTC 3 months    Script for levemir was sent July 2022 as a back up because of pump issue. RN sent [employer] message confirming if pt needs script refilled.

## 2022-09-02 RX ORDER — INSULIN LISPRO 100 [IU]/ML
INJECTION, SOLUTION INTRAVENOUS; SUBCUTANEOUS
Qty: 42 ML | Refills: 2 | Status: SHIPPED | OUTPATIENT
Start: 2022-09-02

## 2022-09-02 RX ORDER — INSULIN DETEMIR 100 [IU]/ML
INJECTION, SOLUTION SUBCUTANEOUS
Qty: 30 ML | Refills: 2 | Status: SHIPPED | OUTPATIENT
Start: 2022-09-02

## 2022-09-02 NOTE — TELEPHONE ENCOUNTER
From: Minerva Velazco  To: Mayco Leiva MD  Sent: 8/9/2018 10:06 PM CDT  Subject: Prescription Question    Hi Dr. Mary Lim,    I picked up the [address] [place] [address] and have been reading about it.  I'm a little concerned about what I've read regarding pancreatitis and Yes

## 2022-09-08 ENCOUNTER — TELEPHONE (OUTPATIENT)
Dept: ENDOCRINOLOGY CLINIC | Facility: CLINIC | Age: 37
End: 2022-09-08

## 2022-09-08 NOTE — TELEPHONE ENCOUNTER
Elicia Flores from [employer] is call to ask about the status of the fax request for diabetic supplies for the patient. Please call.

## 2022-09-08 NOTE — PROCEDURES
[facility]  Accredited by the [place] of [employer] (AASM)    PATIENT'S NAME: DAVINA ANDRES AMADA DIXIE   ATTENDING PHYSICIAN: Joe Madera MD   REFERRING PHYSICIAN: Mary Polanco.  John Madera MD   PATIENT ACCOUNT #: [id number] LOCATION: Mom calling because patient is having blood in her stool and she is constipated.Unable to reach out to clinical staff, please call mom asap at phone [phone]   patient spent 118 minutes in the supine position, 82 minutes in the left side position, and 63 minutes in the right side position.   The respiratory event related arousal index is 73 events per hour and the spontaneous arousal index is 25 events per hour fo

## 2022-09-12 NOTE — TELEPHONE ENCOUNTER
rn called [employer] to inform that forms have not come thru.  Requested she refax to us  Done, awaiting fax

## 2022-11-03 RX ORDER — LEVOTHYROXINE SODIUM 0.07 MG/1
TABLET ORAL
Qty: 90 TABLET | Refills: 1 | Status: SHIPPED | OUTPATIENT
Start: 2022-11-03

## 2022-12-07 ENCOUNTER — PATIENT MESSAGE (OUTPATIENT)
Dept: ENDOCRINOLOGY CLINIC | Facility: CLINIC | Age: 37
End: 2022-12-07

## 2022-12-08 ENCOUNTER — TELEPHONE (OUTPATIENT)
Dept: ENDOCRINOLOGY CLINIC | Facility: CLINIC | Age: 37
End: 2022-12-08

## 2022-12-08 RX ORDER — SEMAGLUTIDE 1.34 MG/ML
INJECTION, SOLUTION SUBCUTANEOUS
Qty: 4.5 ML | Refills: 0 | Status: SHIPPED | OUTPATIENT
Start: 2022-12-08 | End: 2023-04-05

## 2022-12-08 NOTE — TELEPHONE ENCOUNTER
Yes, ok to restart Ozempic 0.25mg subcutaneous weekly for one month then increase to 0.5mg subcutaneous weekly. Also she needs to schedule follow up visit. [address] Scarlett Jonas for [employer]. Thanks.

## 2022-12-08 NOTE — TELEPHONE ENCOUNTER
Prescription for Ozempic starter dose sent. Patient has been updated via [employer]. Dr. Raf Lopes, please see patient response.

## 2022-12-08 NOTE — TELEPHONE ENCOUNTER
Medication PA Requested:semaglutide (OZEMPIC, 0.25 OR 0.5 MG/DOSE,) 2 MG/1.5ML Subcutaneous Solution Pen-injector                                                          CoverMyMeds Used:  Key:  Quantity:4.5 mL  Day Supply:   Sig: Inject 0.25 mg into the skin once a week for 28 days, THEN 0.5 mg once a week  DX Code: E11.65                                    CPT code (if applicable):   Case Number/Pending Ref#:

## 2022-12-09 ENCOUNTER — TELEPHONE (OUTPATIENT)
Dept: ENDOCRINOLOGY CLINIC | Facility: CLINIC | Age: 37
End: 2022-12-09

## 2022-12-09 ENCOUNTER — OFFICE VISIT (OUTPATIENT)
Dept: ENDOCRINOLOGY CLINIC | Facility: CLINIC | Age: 37
End: 2022-12-09
Payer: COMMERCIAL

## 2022-12-09 VITALS
DIASTOLIC BLOOD PRESSURE: 78 MMHG | WEIGHT: 293 LBS | HEART RATE: 96 BPM | SYSTOLIC BLOOD PRESSURE: 113 MMHG | BODY MASS INDEX: 58 KG/M2

## 2022-12-09 DIAGNOSIS — E11.65 UNCONTROLLED TYPE 2 DIABETES MELLITUS WITH HYPERGLYCEMIA (HCC): Primary | ICD-10-CM

## 2022-12-09 DIAGNOSIS — E03.9 HYPOTHYROIDISM, UNSPECIFIED TYPE: ICD-10-CM

## 2022-12-09 LAB
CARTRIDGE LOT#: ABNORMAL NUMERIC
GLUCOSE BLOOD: 195
HEMOGLOBIN A1C: 8.8 % (ref 4.3–5.6)
TEST STRIP LOT #: NORMAL NUMERIC

## 2022-12-09 PROCEDURE — 3074F SYST BP LT 130 MM HG: CPT

## 2022-12-09 PROCEDURE — 3052F HG A1C>EQUAL 8.0%<EQUAL 9.0%: CPT

## 2022-12-09 PROCEDURE — 3078F DIAST BP <80 MM HG: CPT

## 2022-12-09 PROCEDURE — 83036 HEMOGLOBIN GLYCOSYLATED A1C: CPT

## 2022-12-09 PROCEDURE — 99215 OFFICE O/P EST HI 40 MIN: CPT

## 2022-12-09 PROCEDURE — 82947 ASSAY GLUCOSE BLOOD QUANT: CPT

## 2022-12-09 RX ORDER — BLOOD-GLUCOSE TRANSMITTER
EACH MISCELLANEOUS
Qty: 1 EACH | Refills: 0 | Status: SHIPPED | OUTPATIENT
Start: 2022-12-09

## 2022-12-09 RX ORDER — INSULIN PMP CART,AUT,G6/7,CNTR
1 EACH SUBCUTANEOUS AS DIRECTED
Qty: 1 KIT | Refills: 0 | Status: SHIPPED | OUTPATIENT
Start: 2022-12-09

## 2022-12-09 RX ORDER — BLOOD-GLUCOSE SENSOR
EACH MISCELLANEOUS
Qty: 9 EACH | Refills: 0 | Status: SHIPPED | OUTPATIENT
Start: 2022-12-09

## 2022-12-09 RX ORDER — INSULIN LISPRO 100 [IU]/ML
INJECTION, SOLUTION INTRAVENOUS; SUBCUTANEOUS
Qty: 42 ML | Refills: 2 | Status: SHIPPED | OUTPATIENT
Start: 2022-12-09

## 2022-12-09 RX ORDER — INSULIN PMP CART,AUT,G6/7,CNTR
1 EACH SUBCUTANEOUS
Qty: 9 EACH | Refills: 0 | Status: SHIPPED | OUTPATIENT
Start: 2022-12-09

## 2022-12-09 NOTE — TELEPHONE ENCOUNTER
Omnipod 5 packet completed with Cover sheet (current therapy), Rx form, Insurance info, and last OV notes. [facility]     Signed by Massiel Esqueda and faxed to Cecy Vizcaino at [phone] and notified via email    Of note, pt is using around 80 units TDD so will write for q48hr changes

## 2022-12-10 ENCOUNTER — TELEPHONE (OUTPATIENT)
Dept: ENDOCRINOLOGY CLINIC | Facility: CLINIC | Age: 37
End: 2022-12-10

## 2022-12-10 RX ORDER — PHENTERMINE HYDROCHLORIDE 37.5 MG/1
37.5 CAPSULE ORAL EVERY MORNING
Qty: 30 CAPSULE | Refills: 2 | Status: SHIPPED | OUTPATIENT
Start: 2022-12-10

## 2022-12-12 ENCOUNTER — PATIENT MESSAGE (OUTPATIENT)
Dept: ENDOCRINOLOGY CLINIC | Facility: CLINIC | Age: 37
End: 2022-12-12

## 2022-12-12 NOTE — TELEPHONE ENCOUNTER
Dr. Rufino Preciado    Please advise. I placed [employer] form in your folder to sign. [employer] rep sent an email with form.

## 2022-12-12 NOTE — TELEPHONE ENCOUNTER
Medication PA Requested:semaglutide (OZEMPIC, 0.25 OR 0.5 MG/DOSE,) 2 MG/1.5ML Subcutaneous Solution Pen-injector                                                          CoverMyMeds Used:  Key:KEY:  [id number]  Quantity:4.5 mL  Day Supply:   Sig: Inject 0.25 mg into the skin once a week for 28 days, THEN 0.5 mg once a week  DX Code: E11.65     Faxed [employer] PA form with LOV 12/9/2022, a1c , OV 5/12/2022  Awaiting determination.

## 2022-12-13 ENCOUNTER — TELEPHONE (OUTPATIENT)
Dept: ENDOCRINOLOGY CLINIC | Facility: CLINIC | Age: 37
End: 2022-12-13

## 2022-12-13 NOTE — TELEPHONE ENCOUNTER
Christiano from [employer] is also requesting for pt last office visit notes for the pump please follow up fax # [phone] ph [phone] ext

## 2022-12-15 NOTE — TELEPHONE ENCOUNTER
[facility], [phone], spoke with on hold 22 min spoke with Lesly Handy. She states Pa dept very busy, backed up and has not reviewed faxes from 12/12/2022 yet. Performed PA on phone with Lesly Handy. She states pharmacy is running at 4.5ML-so will be denied. She states PA is approved, Brook Thrasher # [id number]  Effective  12/15/2022-12/15/2023. She will fax approval letter. Call ref # [id number] 12/15/2022    Called [facility] [phone], spoke with Humberto Mcpherson, informed her needs to be run for 30 days supply. She ran it and it went through.      My chart message sent to patient of approval.

## 2023-01-11 DIAGNOSIS — R06.09 DYSPNEA ON EXERTION: ICD-10-CM

## 2023-01-12 RX ORDER — ALBUTEROL SULFATE 90 UG/1
2 AEROSOL, METERED RESPIRATORY (INHALATION) EVERY 4 HOURS PRN
Qty: 18 G | Refills: 5 | Status: SHIPPED | OUTPATIENT
Start: 2023-01-12

## 2023-01-13 ENCOUNTER — TELEPHONE (OUTPATIENT)
Dept: ENDOCRINOLOGY CLINIC | Facility: CLINIC | Age: 38
End: 2023-01-13

## 2023-01-13 NOTE — TELEPHONE ENCOUNTER
Received fax from [employer]. The request for Guardian sensor 3 and Guardian link transmitter is approved. Authorization approved for 1 year.    Transmitter: #[id number] (12/15/22- 12/15/23)  Sensor: #[id number] (02/18/23- 02/17/24)

## 2023-01-13 NOTE — TELEPHONE ENCOUNTER
[facility]  ambulatory [facility]. Effective 12/13/22-06/13/23. Sent to scanning.      Authorization #: [id number]

## 2023-02-01 NOTE — TELEPHONE ENCOUNTER
LOV: 12/09/22    No future appointments. RTC 3 months    RN sent [employer] message asking to clarify dose.

## 2023-02-13 ENCOUNTER — PATIENT MESSAGE (OUTPATIENT)
Dept: ENDOCRINOLOGY CLINIC | Facility: CLINIC | Age: 38
End: 2023-02-13

## 2023-02-13 DIAGNOSIS — E11.65 UNCONTROLLED TYPE 2 DIABETES MELLITUS WITH HYPERGLYCEMIA (HCC): Primary | ICD-10-CM

## 2023-02-13 RX ORDER — LEVOTHYROXINE SODIUM 0.2 MG/1
200 TABLET ORAL
Qty: 90 TABLET | Refills: 0 | Status: SHIPPED | OUTPATIENT
Start: 2023-02-13

## 2023-02-13 RX ORDER — LEVOTHYROXINE SODIUM 0.07 MG/1
75 TABLET ORAL
Qty: 90 TABLET | Refills: 0 | Status: SHIPPED | OUTPATIENT
Start: 2023-02-13

## 2023-02-13 RX ORDER — METFORMIN HYDROCHLORIDE 500 MG/1
500 TABLET, EXTENDED RELEASE ORAL 2 TIMES DAILY WITH MEALS
Qty: 180 TABLET | Refills: 0 | Status: SHIPPED | OUTPATIENT
Start: 2023-02-13

## 2023-02-13 NOTE — TELEPHONE ENCOUNTER
Spoke to pharmacist at [facility] - ozempic 0.25mg in stock - pharmacist stated refill on file and will fill for patient  [facility] sent advising patient

## 2023-02-20 RX ORDER — SEMAGLUTIDE 1.34 MG/ML
0.5 INJECTION, SOLUTION SUBCUTANEOUS WEEKLY
Qty: 4.5 ML | Refills: 0 | Status: SHIPPED | OUTPATIENT
Start: 2023-02-20

## 2023-02-20 NOTE — TELEPHONE ENCOUNTER
[facility] sent asking for clarification on which [facility] patient is referring to

## 2023-02-20 NOTE — TELEPHONE ENCOUNTER
RX for Ozempic sent to [facility] in [facility] per patient request  Patient advised due for f/u in March

## 2023-02-28 ENCOUNTER — PATIENT MESSAGE (OUTPATIENT)
Dept: ENDOCRINOLOGY CLINIC | Facility: CLINIC | Age: 38
End: 2023-02-28

## 2023-03-01 RX ORDER — LEVOTHYROXINE SODIUM 0.2 MG/1
200 TABLET ORAL
Qty: 90 TABLET | Refills: 0 | Status: SHIPPED | OUTPATIENT
Start: 2023-03-01

## 2023-03-01 RX ORDER — BLOOD-GLUCOSE TRANSMITTER
1 EACH MISCELLANEOUS
Qty: 1 EACH | Refills: 0 | Status: SHIPPED | OUTPATIENT
Start: 2023-03-01

## 2023-03-01 RX ORDER — BLOOD-GLUCOSE SENSOR
1 EACH MISCELLANEOUS
Qty: 3 EACH | Refills: 3 | Status: SHIPPED | OUTPATIENT
Start: 2023-03-01

## 2023-03-01 RX ORDER — LEVOTHYROXINE SODIUM 0.07 MG/1
75 TABLET ORAL
Qty: 90 TABLET | Refills: 0 | Status: SHIPPED | OUTPATIENT
Start: 2023-03-01

## 2023-03-01 NOTE — TELEPHONE ENCOUNTER
Messaged pt to confirm that Dexcom sensors and transmitters have been sent to pharmacy per pt request. Asked if pt needs refills on Omnipod as well. Awaiting response.

## 2023-03-01 NOTE — TELEPHONE ENCOUNTER
Messaged pt that her message has been received and routed to provider for further review. Reminded pt to schedule follow up appt since last time she saw provider was 12/9/2022. ERIC Ruvalcaba-- see pt message. Pended 90 day supply for Dexcom sensors and transmitter. Approve if agreeable.

## 2023-03-15 ENCOUNTER — PATIENT MESSAGE (OUTPATIENT)
Dept: ENDOCRINOLOGY CLINIC | Facility: CLINIC | Age: 38
End: 2023-03-15

## 2023-03-15 ENCOUNTER — TELEMEDICINE (OUTPATIENT)
Dept: FAMILY MEDICINE CLINIC | Facility: CLINIC | Age: 38
End: 2023-03-15

## 2023-03-15 DIAGNOSIS — A08.4 VIRAL GASTROENTERITIS: Primary | ICD-10-CM

## 2023-03-15 PROCEDURE — 99213 OFFICE O/P EST LOW 20 MIN: CPT | Performed by: PHYSICIAN ASSISTANT

## 2023-03-15 RX ORDER — ONDANSETRON 4 MG/1
4 TABLET, FILM COATED ORAL EVERY 8 HOURS PRN
Qty: 20 TABLET | Refills: 0 | Status: SHIPPED | OUTPATIENT
Start: 2023-03-15

## 2023-03-15 RX ORDER — INSULIN HUMAN 500 [IU]/ML
INJECTION, SOLUTION SUBCUTANEOUS
Qty: 60 ML | Refills: 1 | Status: SHIPPED | OUTPATIENT
Start: 2023-03-15

## 2023-03-31 ENCOUNTER — TELEMEDICINE (OUTPATIENT)
Dept: ENDOCRINOLOGY CLINIC | Facility: CLINIC | Age: 38
End: 2023-03-31

## 2023-03-31 DIAGNOSIS — E03.9 HYPOTHYROIDISM, UNSPECIFIED TYPE: ICD-10-CM

## 2023-03-31 DIAGNOSIS — E11.65 UNCONTROLLED TYPE 2 DIABETES MELLITUS WITH HYPERGLYCEMIA (HCC): Primary | ICD-10-CM

## 2023-03-31 PROCEDURE — 99214 OFFICE O/P EST MOD 30 MIN: CPT

## 2023-04-03 ENCOUNTER — TELEMEDICINE (OUTPATIENT)
Dept: TELEHEALTH | Age: 38
End: 2023-04-03
Payer: COMMERCIAL

## 2023-04-03 DIAGNOSIS — H92.03 EAR PAIN, BILATERAL: ICD-10-CM

## 2023-04-03 DIAGNOSIS — J02.9 SORE THROAT: Primary | ICD-10-CM

## 2023-04-24 RX ORDER — PROCHLORPERAZINE 25 MG/1
SUPPOSITORY RECTAL
Qty: 1 EACH | Refills: 0 | Status: SHIPPED | OUTPATIENT
Start: 2023-04-24

## 2023-04-25 ENCOUNTER — PATIENT MESSAGE (OUTPATIENT)
Dept: ENDOCRINOLOGY CLINIC | Facility: CLINIC | Age: 38
End: 2023-04-25

## 2023-04-28 RX ORDER — PHENTERMINE HYDROCHLORIDE 37.5 MG/1
37.5 CAPSULE ORAL EVERY MORNING
Qty: 30 CAPSULE | Refills: 2 | Status: SHIPPED | OUTPATIENT
Start: 2023-04-28

## 2023-04-28 NOTE — TELEPHONE ENCOUNTER
Dr. David Knox. Patient was on phentermine for three months starting 12/2022. She wanted to know if you thought it was ok that she be on again for another three months. She had been successful with weight loss on medication but now feels weight has been stagnant. I did restart Ozempic for her at last visit when she finished breastfeeding.

## 2023-05-10 DIAGNOSIS — E11.65 UNCONTROLLED TYPE 2 DIABETES MELLITUS WITH HYPERGLYCEMIA (HCC): ICD-10-CM

## 2023-05-11 RX ORDER — SEMAGLUTIDE 1.34 MG/ML
0.5 INJECTION, SOLUTION SUBCUTANEOUS WEEKLY
Qty: 4.5 ML | Refills: 0 | Status: SHIPPED | OUTPATIENT
Start: 2023-05-11

## 2023-05-11 NOTE — TELEPHONE ENCOUNTER
LOV; 03/31/23    RTC;3months    F/U;No FU      Pending Monthly Supply; order pending, approve if appropriate.

## 2023-05-31 ENCOUNTER — PATIENT OUTREACH (OUTPATIENT)
Dept: CASE MANAGEMENT | Age: 38
End: 2023-05-31

## 2023-05-31 ENCOUNTER — TELEPHONE (OUTPATIENT)
Dept: FAMILY MEDICINE CLINIC | Facility: CLINIC | Age: 38
End: 2023-05-31

## 2023-05-31 NOTE — PROCEDURES
The office order for PCP removal request is Approved and finalized on May 31, 2023. Pt lives in [place] [address].   Moved in 12/2022    Thanks,  [address] [address] [facility]

## 2023-05-31 NOTE — TELEPHONE ENCOUNTER
Patient is seeing Truman Ruff PA-C. Removal Request for non-compliant patients. Prolonged time between visits. LOV with MADELIN 01/26/2021.

## 2023-06-11 DIAGNOSIS — E11.65 UNCONTROLLED TYPE 2 DIABETES MELLITUS WITH HYPERGLYCEMIA (HCC): ICD-10-CM

## 2023-06-12 RX ORDER — SEMAGLUTIDE 0.68 MG/ML
INJECTION, SOLUTION SUBCUTANEOUS
Qty: 3 ML | Refills: 1 | Status: SHIPPED | OUTPATIENT
Start: 2023-06-12

## 2023-06-28 ENCOUNTER — LAB ENCOUNTER (OUTPATIENT)
Dept: LAB | Facility: HOSPITAL | Age: 38
End: 2023-06-28
Payer: COMMERCIAL

## 2023-06-28 DIAGNOSIS — E03.9 HYPOTHYROIDISM, UNSPECIFIED TYPE: ICD-10-CM

## 2023-06-28 DIAGNOSIS — E11.65 UNCONTROLLED TYPE 2 DIABETES MELLITUS WITH HYPERGLYCEMIA (HCC): ICD-10-CM

## 2023-06-28 LAB
ALBUMIN SERPL-MCNC: 3.5 G/DL (ref 3.4–5)
ALBUMIN/GLOB SERPL: 0.8 {RATIO} (ref 1–2)
ALP LIVER SERPL-CCNC: 68 U/L
ALT SERPL-CCNC: 29 U/L
ANION GAP SERPL CALC-SCNC: 9 MMOL/L (ref 0–18)
AST SERPL-CCNC: 18 U/L (ref 15–37)
BILIRUB SERPL-MCNC: 0.4 MG/DL (ref 0.1–2)
BUN BLD-MCNC: 12 MG/DL (ref 7–18)
BUN/CREAT SERPL: 15.4 (ref 10–20)
CALCIUM BLD-MCNC: 9.4 MG/DL (ref 8.5–10.1)
CHLORIDE SERPL-SCNC: 107 MMOL/L (ref 98–112)
CHOLEST SERPL-MCNC: 164 MG/DL (ref ?–200)
CO2 SERPL-SCNC: 24 MMOL/L (ref 21–32)
CREAT BLD-MCNC: 0.78 MG/DL
CREAT UR-SCNC: 184 MG/DL
EST. AVERAGE GLUCOSE BLD GHB EST-MCNC: 154 MG/DL (ref 68–126)
FASTING PATIENT LIPID ANSWER: NO
FASTING STATUS PATIENT QL REPORTED: NO
GFR SERPLBLD BASED ON 1.73 SQ M-ARVRAT: 99 ML/MIN/1.73M2 (ref 60–?)
GLOBULIN PLAS-MCNC: 4.2 G/DL (ref 2.8–4.4)
GLUCOSE BLD-MCNC: 67 MG/DL (ref 70–99)
HBA1C MFR BLD: 7 % (ref ?–5.7)
HDLC SERPL-MCNC: 48 MG/DL (ref 40–59)
LDLC SERPL CALC-MCNC: 91 MG/DL (ref ?–100)
MICROALBUMIN UR-MCNC: 18.2 MG/DL
MICROALBUMIN/CREAT 24H UR-RTO: 98.9 UG/MG (ref ?–30)
NONHDLC SERPL-MCNC: 116 MG/DL (ref ?–130)
OSMOLALITY SERPL CALC.SUM OF ELEC: 288 MOSM/KG (ref 275–295)
POTASSIUM SERPL-SCNC: 3.5 MMOL/L (ref 3.5–5.1)
PROT SERPL-MCNC: 7.7 G/DL (ref 6.4–8.2)
SODIUM SERPL-SCNC: 140 MMOL/L (ref 136–145)
T4 FREE SERPL-MCNC: 1 NG/DL (ref 0.8–1.7)
TRIGL SERPL-MCNC: 143 MG/DL (ref 30–149)
TSI SER-ACNC: 12.4 MIU/ML (ref 0.36–3.74)
VLDLC SERPL CALC-MCNC: 23 MG/DL (ref 0–30)

## 2023-06-28 PROCEDURE — 84443 ASSAY THYROID STIM HORMONE: CPT

## 2023-06-28 PROCEDURE — 80061 LIPID PANEL: CPT

## 2023-06-28 PROCEDURE — 82043 UR ALBUMIN QUANTITATIVE: CPT

## 2023-06-28 PROCEDURE — 36415 COLL VENOUS BLD VENIPUNCTURE: CPT

## 2023-06-28 PROCEDURE — 3060F POS MICROALBUMINURIA REV: CPT

## 2023-06-28 PROCEDURE — 80053 COMPREHEN METABOLIC PANEL: CPT

## 2023-06-28 PROCEDURE — 3061F NEG MICROALBUMINURIA REV: CPT

## 2023-06-28 PROCEDURE — 83036 HEMOGLOBIN GLYCOSYLATED A1C: CPT

## 2023-06-28 PROCEDURE — 82570 ASSAY OF URINE CREATININE: CPT

## 2023-06-28 PROCEDURE — 84439 ASSAY OF FREE THYROXINE: CPT

## 2023-08-09 RX ORDER — LEVOTHYROXINE SODIUM 0.07 MG/1
75 TABLET ORAL
Qty: 90 TABLET | Refills: 0 | Status: SHIPPED | OUTPATIENT
Start: 2023-08-09

## 2023-08-09 RX ORDER — LEVOTHYROXINE SODIUM 0.2 MG/1
200 TABLET ORAL
Qty: 90 TABLET | Refills: 0 | Status: SHIPPED | OUTPATIENT
Start: 2023-08-09

## 2023-08-09 RX ORDER — PROCHLORPERAZINE 25 MG/1
SUPPOSITORY RECTAL
Qty: 1 EACH | Refills: 0 | Status: SHIPPED | OUTPATIENT
Start: 2023-08-09

## 2023-08-09 NOTE — TELEPHONE ENCOUNTER
LOV 03/31/23. RTC 3 months. No f/u. [facility] Sent [facility]. Pended 3 month supply.

## 2023-08-10 ENCOUNTER — TELEPHONE (OUTPATIENT)
Dept: ENDOCRINOLOGY CLINIC | Facility: CLINIC | Age: 38
End: 2023-08-10

## 2023-08-10 RX ORDER — INSULIN DETEMIR 100 [IU]/ML
95 INJECTION, SOLUTION SUBCUTANEOUS NIGHTLY
Qty: 90 ML | Refills: 0 | Status: SHIPPED | OUTPATIENT
Start: 2023-08-10

## 2023-08-10 NOTE — TELEPHONE ENCOUNTER
Received fax from [employer] stating Ozempic requires PA. Medication  CGM  pump supply Requested: Ozempic (0.25 or 0.5 MG/DOSE) 2 MG/3ML Solution Pen-injector     Sig: INJECT 0.5 MG INTO THE SKIN ONCE A WEEK.      DX Code: E11.65    Arya ROSAS faxed with LOV 3/31/2023, aic 12/2022, 6/2023  Awaiting determination

## 2023-08-10 NOTE — TELEPHONE ENCOUNTER
insulin detemir (LEVEMIR FLEXTOUCH) 100 UNIT/ML Subcutaneous Solution Pen-injector INJECT 95 UNITS INTO THE SKIN NIGHTLY 30 mL 2    Note from pharmacy:  [facility] is discontinued please change to [address]

## 2023-08-10 NOTE — TELEPHONE ENCOUNTER
Received fax from [employer] stating Ozempic requires PA. Medication  CGM  pump supply Requested: Ozempic (0.25 or 0.5 MG/DOSE) 2 MG/3ML Solution Pen-injector     Sig: INJECT 0.5 MG INTO THE SKIN ONCE A WEEK.      DX Code: E11.65                                       Case Number/Pending Ref#:

## 2023-08-14 NOTE — TELEPHONE ENCOUNTER
Received fax from [employer] stating  \"Unable to locate active eligibility\".  Routed back to ALISON castillot

## 2023-08-15 ENCOUNTER — MED REC SCAN ONLY (OUTPATIENT)
Dept: ENDOCRINOLOGY CLINIC | Facility: CLINIC | Age: 38
End: 2023-08-15

## 2023-08-15 NOTE — TELEPHONE ENCOUNTER
Called [facility] [phone], spoke with Michelle. She states patient PA done by [facility], transferred me to [facility] at [phone], spoke with Arlene. She states approved for one year until 8/15/2024,. Lanette Morganhew [id number]. She will fax letter of approval.   Call ref# [id number]    [facility]  [phone], spoke with Jaycee Babcock, states went through and patient picked up on 8/14/2023. Medication  CGM  pump supply Requested: Ozempic (0.25 or 0.5 MG/DOSE) 2 MG/3ML Solution Pen-injector      Sig: INJECT 0.5 MG INTO THE SKIN ONCE A WEEK.       DX Code: E11.65

## 2023-09-11 NOTE — TELEPHONE ENCOUNTER
C/o hyperglycemia due to pump \"came off\" - BB BG 96 4/19    Pt also states she lost 60 units of insulin.     Forwarding for provider as [address] [address] - please advise if needed
From: Matheus Jung  To: Roxanna Wright MD  Sent: 4/19/2019 8:33 AM CDT  Subject: Test Results Question    Hi Dr. Gia Melgoza,    I wanted to let you know that last night my pump connection came out of my body.  My fasting was a little higher than normal at
Ok, noted. Please confirm that she is now back on insulin pump and sugars are improving.
Ok, noted. Thanks.
PT confirmed she is wearing pump and \"all is good\"
RN replied to pt asking her to confirm she is back on pump and BGs are improving - will await response
No

## 2023-09-21 RX ORDER — INSULIN LISPRO 100 [IU]/ML
INJECTION, SOLUTION INTRAVENOUS; SUBCUTANEOUS
Qty: 42 ML | Refills: 2 | Status: SHIPPED | OUTPATIENT
Start: 2023-09-21

## 2023-09-28 ENCOUNTER — PATIENT MESSAGE (OUTPATIENT)
Dept: ENDOCRINOLOGY CLINIC | Facility: CLINIC | Age: 38
End: 2023-09-28

## 2023-09-29 NOTE — TELEPHONE ENCOUNTER
Dr. Astorga Fuelling patient however has seen you in the past. Patient currently takes Ozempic 0.5 mg weekly. She is scheduled to see Veena Ba in December. Please advise.

## 2023-09-30 RX ORDER — SEMAGLUTIDE 1.34 MG/ML
1 INJECTION, SOLUTION SUBCUTANEOUS WEEKLY
Qty: 9 ML | Refills: 0 | Status: SHIPPED | OUTPATIENT
Start: 2023-09-30

## 2023-10-05 RX ORDER — PROCHLORPERAZINE 25 MG/1
1 SUPPOSITORY RECTAL
Qty: 9 EACH | Refills: 0 | Status: SHIPPED | OUTPATIENT
Start: 2023-10-05

## 2023-10-05 NOTE — TELEPHONE ENCOUNTER
LOV: 3/31/23    Future Appointments   Date Time Provider Christa Teresa   12/29/2023 11:15 AM GAURAV Garces [facility]     Refilled per protocol.

## 2023-10-15 ENCOUNTER — PATIENT MESSAGE (OUTPATIENT)
Dept: FAMILY MEDICINE CLINIC | Facility: CLINIC | Age: 38
End: 2023-10-15

## 2023-10-20 ENCOUNTER — PATIENT MESSAGE (OUTPATIENT)
Dept: ENDOCRINOLOGY CLINIC | Facility: CLINIC | Age: 38
End: 2023-10-20

## 2023-10-24 RX ORDER — PROCHLORPERAZINE 25 MG/1
SUPPOSITORY RECTAL
Qty: 3 EACH | Refills: 2 | Status: SHIPPED | OUTPATIENT
Start: 2023-10-24

## 2023-10-24 NOTE — TELEPHONE ENCOUNTER
Spoke with [facility] [phone], pt can only receive 30 day supply at a time . No prior auth needed, new rx sent with 30 supply and  note to pharmacy.

## 2023-10-24 NOTE — TELEPHONE ENCOUNTER
Insulin Lispro, 1 Unit Dial, (HUMALOG KWIKPEN) 100 UNIT/ML Subcutaneous Solution Pen-injector, INJECT BASED ON INSULIN TO CARB RATIO OF 1:8 + SLIDING SCALE. MAX DAILY DOSE  UNITS.  TO BE USED DURING PUMP FAILURE, Disp: 42 mL, Rfl: 2      PATIENT IS REQUESTING A 90 DAY SUPPLY

## 2023-10-27 RX ORDER — INSULIN LISPRO 100 [IU]/ML
INJECTION, SOLUTION INTRAVENOUS; SUBCUTANEOUS
Qty: 42 ML | Refills: 2 | Status: SHIPPED | OUTPATIENT
Start: 2023-10-27

## 2023-11-09 RX ORDER — LEVOTHYROXINE SODIUM 0.07 MG/1
75 TABLET ORAL
Qty: 90 TABLET | Refills: 0 | Status: SHIPPED | OUTPATIENT
Start: 2023-11-09

## 2023-11-09 RX ORDER — LEVOTHYROXINE SODIUM 0.2 MG/1
200 TABLET ORAL
Qty: 90 TABLET | Refills: 1 | Status: SHIPPED | OUTPATIENT
Start: 2023-11-09

## 2023-12-11 ENCOUNTER — TELEPHONE (OUTPATIENT)
Dept: ENDOCRINOLOGY CLINIC | Facility: CLINIC | Age: 38
End: 2023-12-11

## 2023-12-11 RX ORDER — INSULIN LISPRO 100 [IU]/ML
INJECTION, SOLUTION INTRAVENOUS; SUBCUTANEOUS
Qty: 42 ML | Refills: 2 | Status: SHIPPED | OUTPATIENT
Start: 2023-12-11

## 2023-12-11 NOTE — TELEPHONE ENCOUNTER
Alternative requested  Insurance requires 90 day    Current Outpatient Medications   Medication Sig Dispense Refill                  Insulin Lispro, 1 Unit Dial, (HUMALOG KWIKPEN) 100 UNIT/ML Subcutaneous Solution Pen-injector INJECT BASED ON INSULIN TO CARB RATIO OF 1:8 + SLIDING SCALE. MAX DAILY DOSE  UNITS.  TO BE USED DURING PUMP FAILURE 42 mL 2

## 2023-12-11 NOTE — TELEPHONE ENCOUNTER
Called pt to verify refill was needed sice last refill was 10/27/23. Pt states her pump wasn't working for awhile so Humalog is needed. Pended 3 month supply please approve if appropriate.

## 2023-12-26 RX ORDER — INSULIN HUMAN 500 [IU]/ML
INJECTION, SOLUTION SUBCUTANEOUS
Qty: 60 ML | Refills: 1 | Status: SHIPPED | OUTPATIENT
Start: 2023-12-26 | End: 2023-12-29

## 2023-12-26 NOTE — TELEPHONE ENCOUNTER
Pharmacy requesting 90 day refill of     Insulin Lispro, 1 Unit Dial, (HUMALOG KWIKPEN) 100 UNIT/ML Subcutaneous Solution Pen-injector, INJECT BASED ON INSULIN TO CARB RATIO OF 1:8 + SLIDING SCALE. MAX DAILY DOSE  UNITS.  TO BE USED DURING PUMP FAILURE, Disp: 42 mL, Rfl: 2

## 2023-12-27 RX ORDER — INSULIN LISPRO 100 [IU]/ML
INJECTION, SOLUTION INTRAVENOUS; SUBCUTANEOUS
Qty: 42 ML | Refills: 2 | Status: SHIPPED | OUTPATIENT
Start: 2023-12-27 | End: 2023-12-29

## 2023-12-29 ENCOUNTER — OFFICE VISIT (OUTPATIENT)
Dept: ENDOCRINOLOGY CLINIC | Facility: CLINIC | Age: 38
End: 2023-12-29

## 2023-12-29 ENCOUNTER — PATIENT MESSAGE (OUTPATIENT)
Dept: ENDOCRINOLOGY CLINIC | Facility: CLINIC | Age: 38
End: 2023-12-29

## 2023-12-29 VITALS
DIASTOLIC BLOOD PRESSURE: 73 MMHG | BODY MASS INDEX: 53 KG/M2 | WEIGHT: 293 LBS | SYSTOLIC BLOOD PRESSURE: 115 MMHG | HEART RATE: 119 BPM

## 2023-12-29 DIAGNOSIS — E11.65 UNCONTROLLED TYPE 2 DIABETES MELLITUS WITH HYPERGLYCEMIA (HCC): Primary | ICD-10-CM

## 2023-12-29 LAB
CARTRIDGE LOT#: ABNORMAL NUMERIC
GLUCOSE BLOOD: 223
HEMOGLOBIN A1C: 7 % (ref 4.3–5.6)
TEST STRIP LOT #: NORMAL NUMERIC

## 2023-12-29 PROCEDURE — 83036 HEMOGLOBIN GLYCOSYLATED A1C: CPT

## 2023-12-29 PROCEDURE — 3051F HG A1C>EQUAL 7.0%<8.0%: CPT

## 2023-12-29 PROCEDURE — 3078F DIAST BP <80 MM HG: CPT

## 2023-12-29 PROCEDURE — 82947 ASSAY GLUCOSE BLOOD QUANT: CPT

## 2023-12-29 PROCEDURE — 99214 OFFICE O/P EST MOD 30 MIN: CPT

## 2023-12-29 PROCEDURE — 3074F SYST BP LT 130 MM HG: CPT

## 2023-12-29 RX ORDER — TIRZEPATIDE 10 MG/.5ML
10 INJECTION, SOLUTION SUBCUTANEOUS WEEKLY
Qty: 2 ML | Refills: 0 | Status: SHIPPED | OUTPATIENT
Start: 2023-12-29 | End: 2023-12-30

## 2023-12-29 RX ORDER — LOSARTAN POTASSIUM 25 MG/1
25 TABLET ORAL DAILY
Qty: 30 TABLET | Refills: 5 | Status: SHIPPED | OUTPATIENT
Start: 2023-12-29

## 2023-12-29 RX ORDER — INSULIN HUMAN 500 [IU]/ML
INJECTION, SOLUTION SUBCUTANEOUS
Qty: 60 ML | Refills: 1 | Status: SHIPPED | OUTPATIENT
Start: 2023-12-29

## 2023-12-29 RX ORDER — INSULIN LISPRO 100 [IU]/ML
INJECTION, SOLUTION INTRAVENOUS; SUBCUTANEOUS
Qty: 42 ML | Refills: 2 | Status: SHIPPED | OUTPATIENT
Start: 2023-12-29

## 2023-12-29 RX ORDER — PROCHLORPERAZINE 25 MG/1
SUPPOSITORY RECTAL
Qty: 1 EACH | Refills: 0 | Status: SHIPPED | OUTPATIENT
Start: 2023-12-29

## 2023-12-29 NOTE — PATIENT INSTRUCTIONS
Start Mounjaro 10mg subcutaneous weekly x 4 weeks.  Update me after the first month and if feeling well we will increase further to 12.5mg for one month and then 15mg weekly     Continue pump settings    Start Losartan 25mg once daily for kidney protection

## 2023-12-30 RX ORDER — TIRZEPATIDE 10 MG/.5ML
10 INJECTION, SOLUTION SUBCUTANEOUS WEEKLY
Qty: 2 ML | Refills: 0 | Status: SHIPPED | OUTPATIENT
Start: 2023-12-30

## 2023-12-30 NOTE — TELEPHONE ENCOUNTER
Called [facility] and was told that they have the RX for the [employer] and put it on file because the patient got 33 days supply on 12/19/23 and it's too soon to fill another one. RN also informed them about approval on Mounjaro and RN was asked to send another RX because the one they had is closed out and pharmacist could not figure out why.

## 2024-01-02 RX ORDER — TIRZEPATIDE 10 MG/.5ML
10 INJECTION, SOLUTION SUBCUTANEOUS WEEKLY
Qty: 2 ML | Refills: 0 | Status: SHIPPED | OUTPATIENT
Start: 2024-01-02

## 2024-01-27 ENCOUNTER — PATIENT MESSAGE (OUTPATIENT)
Dept: ENDOCRINOLOGY CLINIC | Facility: CLINIC | Age: 39
End: 2024-01-27

## 2024-01-29 RX ORDER — PERPHENAZINE 16 MG/1
TABLET, FILM COATED ORAL
Qty: 600 EACH | Refills: 5 | Status: SHIPPED | OUTPATIENT
Start: 2024-01-29

## 2024-01-29 RX ORDER — BLOOD-GLUCOSE METER
1 EACH MISCELLANEOUS ONCE
Qty: 1 KIT | Refills: 0 | Status: SHIPPED | OUTPATIENT
Start: 2024-01-29 | End: 2024-01-29

## 2024-01-29 NOTE — TELEPHONE ENCOUNTER
From: Destini Cortes  To: Cassia Leigh  Sent: 1/27/2024 6:53 PM CST  Subject: New meter    Keegan Ding,   I was wondering is it possible to get a new meter, test strips, lancets, etc? I think the one I have is over two or three years old so that I can have it as a backup and calibrate? My last sensor today malfunctioned so they are sending me a new one and I thought I should have updated supplies so I can check my sugars in the meantime.

## 2024-02-23 RX ORDER — LEVOTHYROXINE SODIUM 0.07 MG/1
75 TABLET ORAL
Qty: 90 TABLET | Refills: 0 | Status: SHIPPED | OUTPATIENT
Start: 2024-02-23

## 2024-03-01 RX ORDER — PROCHLORPERAZINE 25 MG/1
SUPPOSITORY RECTAL
Qty: 1 EACH | Refills: 0 | Status: SHIPPED | OUTPATIENT
Start: 2024-03-01

## 2024-03-27 NOTE — TELEPHONE ENCOUNTER
Insulin Disposable Pump (OMNIPOD 5 G6 POD, GEN 5,) Does not apply Misc 1 each every other day. Change pod every 2 days 9 each 0

## 2024-03-28 NOTE — TELEPHONE ENCOUNTER
Endocrine Refill protocol for Omnipod insulin pumps and supplies    Protocol Criteria:    Appointment with Endocrinology within the last 6 months or in the next 3 months     Verify appointment has been completed or scheduled in the appropriate timeline. If so can send a 90 day supply with 1 refill.     Last completed office visit:12/29/23  Next scheduled Follow up:  no f/u

## 2024-03-29 RX ORDER — INSULIN PMP CART,AUT,G6/7,CNTR
1 EACH SUBCUTANEOUS
Qty: 9 EACH | Refills: 1 | Status: SHIPPED | OUTPATIENT
Start: 2024-03-29

## 2024-04-21 ENCOUNTER — PATIENT MESSAGE (OUTPATIENT)
Dept: ENDOCRINOLOGY CLINIC | Facility: CLINIC | Age: 39
End: 2024-04-21

## 2024-04-23 NOTE — TELEPHONE ENCOUNTER
From: Destini Cortes  To: Cassia Monalisa Leigh  Sent: 4/21/2024 2:18 PM CDT  Subject: Still no munjauro    Hi Toña,   I feel like I’m at the end of my rope here. I never got to take the higher dose of munjauro because they continue to be out of it everywhere. I still can’t lose weight. I can’t sleep I can hardly function. I signed up to  two of my kids soccer teams again but I’m so worried about how my apnea is not getting better since I can’t lose weight. Should we try the phentermine again since I’ve been off it awhile? Maybe with the extra physical activity this summer I can try to get past this hump until the munjauro is available? What do you think?

## 2024-05-01 RX ORDER — TIRZEPATIDE 15 MG/.5ML
15 INJECTION, SOLUTION SUBCUTANEOUS WEEKLY
Qty: 2 ML | Refills: 3 | Status: SHIPPED | OUTPATIENT
Start: 2024-05-01

## 2024-05-02 RX ORDER — PROCHLORPERAZINE 25 MG/1
1 SUPPOSITORY RECTAL
Qty: 9 EACH | Refills: 1 | Status: SHIPPED | OUTPATIENT
Start: 2024-05-02

## 2024-05-02 RX ORDER — PROCHLORPERAZINE 25 MG/1
SUPPOSITORY RECTAL
Qty: 3 EACH | Refills: 1 | Status: SHIPPED | OUTPATIENT
Start: 2024-05-02

## 2024-05-02 NOTE — TELEPHONE ENCOUNTER
Endocrine Refill protocol for CGM supplies       Protocol Criteria:  Appointment with Endocrinology completed in the last 12 months or scheduled in the next 6 months     Verify appointment has been completed or scheduled in the appropriate timeline. If so can send a 90 day supply with 1 refill.        Last completed office visit: 12/29/2023  Next scheduled Follow up: none sent message reminding pt to book   Fu in June  Meets protocol , sent

## 2024-05-02 NOTE — TELEPHONE ENCOUNTER
Endocrine Refill protocol for CGM supplies       Protocol Criteria:  Appointment with Endocrinology completed in the last 12 months or scheduled in the next 6 months     Verify appointment has been completed or scheduled in the appropriate timeline. If so can send a 90 day supply with 1 refill.      Last completed office visit: 12/29/23

## 2024-05-02 NOTE — TELEPHONE ENCOUNTER
Endocrine Refill protocol for CGM supplies       Protocol Criteria:PASS  Appointment with Endocrinology completed in the last 12 months or scheduled in the next 6 months     Verify appointment has been completed or scheduled in the appropriate timeline. If so can send a 90 day supply with 1 refill.        Last completed office visit:12/29/2023  Next scheduled Follow up: rtc in 6 months    Lvm for pt to call back to schedule follow up appt.    Will mcm as well

## 2024-05-28 RX ORDER — LEVOTHYROXINE SODIUM 0.07 MG/1
75 TABLET ORAL
Qty: 90 TABLET | Refills: 0 | Status: SHIPPED | OUTPATIENT
Start: 2024-05-28

## 2024-05-28 NOTE — TELEPHONE ENCOUNTER
Endocrine refill protocol for medications for hypothyroidism and hyperthyroidism    Protocol Criteria:  Appointment with Endocrinology completed in the last 12 months or scheduled in the next  6months     Verify appointment has been completed or scheduled in the appropriate timeline. If so can send a 90 day supply with 1 refill per provider protocol.    Normal TSH result in the past 12 months   Review recent telephone encounters and mychart communications with patient to ensure a dose change has not occurred since last office visit that was not updated in the medication history list   Last completed office visit: 12/29/2023  Next scheduled Follow up: 06/28/2024  Future Appointments   Date Time Provider Department Center   6/28/2024 11:15 AM Cassia Leigh APRN [facility]      Last TSH result: 12.400

## 2024-05-29 RX ORDER — LEVOTHYROXINE SODIUM 0.2 MG/1
200 TABLET ORAL
Qty: 90 TABLET | Refills: 1 | Status: SHIPPED | OUTPATIENT
Start: 2024-05-29

## (undated) DEVICE — Device

## (undated) DEVICE — LAWSON - CANISTER SUCT W/LID 3000CC

## (undated) DEVICE — LAWSON - MAXITUBE FLITES 30X77IN

## (undated) NOTE — LETTER
5/24/2021 [address] [address]         To Whom It May Concern,    I am prescribing Minimed 770 G system with guardian sensor 3 and Prospectvision link 3 transmitter for the above patient ajit

## (undated) NOTE — LETTER
5/7/2019              Joao 30        [address] [facility]         Dear [facility],    [address] [facility] records indicate that the thyroid tests ordered for you by Gina Spaulding MD  have not been done.   If you have, in fact, already

## (undated) NOTE — MR AVS SNAPSHOT
[facility]  [address] [place] [address]  [phone]               Thank you for choosing us for your health care visit with Hunter Bronson MD.  We are glad to serve you and happy to provide you with this summary of your visit.   Ple Commonly known as:  ONETOUCH VERIO           ibuprofen 200 MG Tabs   Take 200 mg by mouth every 6 (six) hours as needed for Pain.    Commonly known as:  MOTRIN           insulin aspart 100 UNIT/ML Sopn   Inject 24 units with breakfast, 24 units with lunch, Visit [facility] online at  [email or web address]

## (undated) NOTE — MR AVS SNAPSHOT
[facility]  [address] [place] [address] [phone]               Thank you for choosing us for your health care visit with Nurse. We are glad to serve you and happy to provide you with this summary of your visit.   Please help u Levothyroxine Sodium 137 MCG Tabs   Take 137 mcg by mouth before breakfast.   Commonly known as:  SYNTHROID, LEVOTHROID           ONETOUCH DELICA LANCETS 33C Misc           progesterone 50 MG/ML Oil   daily.                    MyChart     Visit Emily Calderon

## (undated) NOTE — MR AVS SNAPSHOT
[facility]  [address] [place] [address] [phone]               Thank you for choosing us for your health care visit with Nurse. We are glad to serve you and happy to provide you with this summary of your visit.   Please help u Commonly known as:  LEVEMIR FLEXTOUCH           Insulin Pen Needle 32G X 4 MM Misc   Use with injection 4 times daily with meals   Commonly known as:  BD PEN NEEDLE MARIA G U/F           Levothyroxine Sodium 150 MCG Tabs   Take 1 tablet (150 mcg total) by annabelle

## (undated) NOTE — MR AVS SNAPSHOT
[facility]  [address] [place] [address]  [phone]               Thank you for choosing us for your health care visit with Lexie De Jesus MD.  We are glad to serve you and happy to provide you with this summary of your visit.   Ple Increase Levemir to 60 units SQ QAM, 85 units SQ with dinner and bedtime    The day before  take normal dose of insulin     In the morning take normal dose of Novolog but no levemir the day of procedure.       After delivery restart Metformin ER 50 Where to Get Your Medications      These medications were sent to [facility]Y #[id number]- [place] - [address] [facility] [address], [phone], [phone]  [facility] [address]  [address] [place] [address] [place] [address]     Phone:  [phone] Eat plenty of low-fat dairy products High fat meats and dairy   Choose whole grain products Foods high in sodium   Water is best for hydration Fast food.    Eat at home when possible     Tips for increasing your physical activity – Adults who are physically

## (undated) NOTE — MR AVS SNAPSHOT
[facility]  [address] [place] [address] [phone]               Thank you for choosing us for your health care visit with Abi Luna MD.  We are glad to serve you and happy to provide you with this summary of your visit.   Ple Commonly known as:  BD PEN NEEDLE MARIA G U/F           Levothyroxine Sodium 137 MCG Tabs   Take 137 mcg by mouth before breakfast.   Commonly known as:  SYNTHROID, LEVOTHROID           ONETOUCH DELICA LANCETS 27O Misc   Use to check sugar 7 times daily as di

## (undated) NOTE — Clinical Note
She was hoping to start on Phentermine which we discussed briefly at visit but I am not able to prescribe. I did let her know I would route a message to you to see if ok.

## (undated) NOTE — LETTER
Ileana Whitfield, Do  [address]   [address]  [address] [facility], [address] [facility]       03/27/20        Patient: Jose Duran   YOB: 1985   Date of Visit: 3/27/2020       Dear  Dr. Bladimir Singleton,      Thank you for referring Jose Kingstonosevelt